# Patient Record
Sex: FEMALE | Race: WHITE | Employment: FULL TIME | URBAN - METROPOLITAN AREA
[De-identification: names, ages, dates, MRNs, and addresses within clinical notes are randomized per-mention and may not be internally consistent; named-entity substitution may affect disease eponyms.]

---

## 2017-01-13 ENCOUNTER — HOSPITAL ENCOUNTER (OUTPATIENT)
Dept: NON INVASIVE DIAGNOSTICS | Age: 62
Discharge: HOME | End: 2017-01-13
Attending: INTERNAL MEDICINE

## 2017-01-13 DIAGNOSIS — E11.9 CONTROLLED TYPE 2 DIABETES MELLITUS WITHOUT COMPLICATION, WITHOUT LONG-TERM CURRENT USE OF INSULIN (HCC): ICD-10-CM

## 2017-01-13 DIAGNOSIS — Z00.00 PHYSICAL EXAM: ICD-10-CM

## 2017-01-13 LAB
ATTENDING PHYSICIAN, CST07: NORMAL
DIAGNOSIS, 93000: NORMAL
DUKE TM SCORE RESULT, CST14: NORMAL
DUKE TREADMILL SCORE, CST13: 1
ECG INTERP BEFORE EX, CST11: NORMAL
ECG INTERP DURING EX, CST12: NORMAL
FUNCTIONAL CAPACITY, CST17: NORMAL
KNOWN CARDIAC CONDITION, CST08: NORMAL
MAX. DIASTOLIC BP, CST04: 86 MMHG
MAX. HEART RATE, CST05: 155 BPM
MAX. SYSTOLIC BP, CST03: 150 MMHG
OVERALL BP RESPONSE TO EXERCISE, CST16: NORMAL
OVERALL HR RESPONSE TO EXERCISE, CST15: NORMAL
PEAK EX METS, CST10: 7 METS
PROTOCOL NAME, CST01: NORMAL
TEST INDICATION, CST09: NORMAL

## 2017-01-13 NOTE — TELEPHONE ENCOUNTER
Patient would like to request a new [employer]. Patient mentioned that this was discussed at her last visit. Please advise!

## 2017-01-15 RX ORDER — INSULIN PUMP SYRINGE, 3 ML
EACH MISCELLANEOUS
Qty: 1 KIT | Refills: 0 | Status: SHIPPED | OUTPATIENT
Start: 2017-01-15 | End: 2021-08-03 | Stop reason: SDUPTHER

## 2017-05-10 RX ORDER — LOSARTAN POTASSIUM 25 MG/1
TABLET ORAL
Qty: 90 TAB | Refills: 0 | Status: SHIPPED | OUTPATIENT
Start: 2017-05-10 | End: 2017-07-20 | Stop reason: SDUPTHER

## 2017-05-10 RX ORDER — ATORVASTATIN CALCIUM 10 MG/1
TABLET, FILM COATED ORAL
Qty: 90 TAB | Refills: 0 | Status: SHIPPED | OUTPATIENT
Start: 2017-05-10 | End: 2017-07-20 | Stop reason: SDUPTHER

## 2017-05-10 RX ORDER — LEVOTHYROXINE SODIUM 25 UG/1
TABLET ORAL
Qty: 90 TAB | Refills: 0 | Status: SHIPPED | OUTPATIENT
Start: 2017-05-10 | End: 2017-07-20 | Stop reason: SDUPTHER

## 2017-05-10 RX ORDER — METFORMIN HYDROCHLORIDE 500 MG/1
TABLET ORAL
Qty: 180 TAB | Refills: 0 | Status: SHIPPED | OUTPATIENT
Start: 2017-05-10 | End: 2017-07-20 | Stop reason: SDUPTHER

## 2017-07-20 ENCOUNTER — OFFICE VISIT (OUTPATIENT)
Dept: INTERNAL MEDICINE CLINIC | Facility: CLINIC | Age: 62
End: 2017-07-20

## 2017-07-20 VITALS
HEART RATE: 75 BPM | WEIGHT: 215 LBS | DIASTOLIC BLOOD PRESSURE: 73 MMHG | BODY MASS INDEX: 38.09 KG/M2 | HEIGHT: 63 IN | RESPIRATION RATE: 18 BRPM | SYSTOLIC BLOOD PRESSURE: 137 MMHG | TEMPERATURE: 98.3 F

## 2017-07-20 DIAGNOSIS — E78.5 HYPERLIPIDEMIA, UNSPECIFIED HYPERLIPIDEMIA TYPE: ICD-10-CM

## 2017-07-20 DIAGNOSIS — E03.9 ACQUIRED HYPOTHYROIDISM: ICD-10-CM

## 2017-07-20 DIAGNOSIS — E66.09 OBESITY DUE TO EXCESS CALORIES, UNSPECIFIED OBESITY SEVERITY: ICD-10-CM

## 2017-07-20 DIAGNOSIS — I10 ESSENTIAL HYPERTENSION WITH GOAL BLOOD PRESSURE LESS THAN 140/90: ICD-10-CM

## 2017-07-20 DIAGNOSIS — E11.9 CONTROLLED TYPE 2 DIABETES MELLITUS WITHOUT COMPLICATION, WITHOUT LONG-TERM CURRENT USE OF INSULIN (HCC): Primary | ICD-10-CM

## 2017-07-20 LAB — HBA1C MFR BLD HPLC: 6.4 %

## 2017-07-20 RX ORDER — LOSARTAN POTASSIUM 25 MG/1
TABLET ORAL
Qty: 90 TAB | Refills: 1 | Status: SHIPPED | OUTPATIENT
Start: 2017-07-20 | End: 2017-11-10 | Stop reason: SDUPTHER

## 2017-07-20 RX ORDER — LEVOTHYROXINE SODIUM 25 UG/1
TABLET ORAL
Qty: 90 TAB | Refills: 1 | Status: SHIPPED | OUTPATIENT
Start: 2017-07-20 | End: 2017-11-10 | Stop reason: SDUPTHER

## 2017-07-20 RX ORDER — METFORMIN HYDROCHLORIDE 500 MG/1
TABLET ORAL
Qty: 180 TAB | Refills: 1 | Status: SHIPPED | OUTPATIENT
Start: 2017-07-20 | End: 2017-11-10 | Stop reason: SDUPTHER

## 2017-07-20 RX ORDER — ATORVASTATIN CALCIUM 10 MG/1
TABLET, FILM COATED ORAL
Qty: 90 TAB | Refills: 1 | Status: SHIPPED | OUTPATIENT
Start: 2017-07-20 | End: 2017-11-10 | Stop reason: SDUPTHER

## 2017-07-20 NOTE — MR AVS SNAPSHOT
Visit Information Date & Time Provider Department Dept. Phone Encounter #  
 7/20/2017  8:15 AM Kavya Tran  [facility] Internal Medicine [phone] [id number] Follow-up Instructions Return in about 3 months (around 10/20/2017) for follow up diabetes check, and weight check. Upcoming Health Maintenance Date Due Hepatitis C Screening 1955 FOOT EXAM Q1 5/6/1964 EYE EXAM RETINAL OR DILATED Q1 5/6/1964 FOBT Q 1 YEAR AGE 50-75 5/6/2004 PAP AKA CERVICAL CYTOLOGY 5/8/2009 ZOSTER VACCINE AGE 60> 5/6/2014 HEMOGLOBIN A1C Q6M 6/28/2017 INFLUENZA AGE 9 TO ADULT 8/1/2017 MICROALBUMIN Q1 12/28/2017 LIPID PANEL Q1 12/28/2017 BREAST CANCER SCRN MAMMOGRAM 3/21/2019 DTaP/Tdap/Td series (2 - Td) 9/9/2026 Allergies as of 7/20/2017  Review Complete On: 7/20/2017 By: Ciera Soriano LPN Severity Noted Reaction Type Reactions Penicillins  01/02/2014    Unknown (comments) Current Immunizations  Never Reviewed Name Date Influenza Vaccine (Quad) PF 9/9/2016, 11/25/2015 Pneumococcal Polysaccharide (PPSV-23) 7/15/2015 Tdap 9/9/2016 Not reviewed this visit You Were Diagnosed With   
  
 Codes Comments Controlled type 2 diabetes mellitus without complication, without long-term current use of insulin ([facility]    -  Primary ICD-10-CM: E11.9 ICD-9-CM: 250.00 Hyperlipidemia, unspecified hyperlipidemia type     ICD-10-CM: E78.5 ICD-9-CM: 272.4 Essential hypertension with goal blood pressure less than 140/90     ICD-10-CM: I10 
ICD-9-CM: 401.9 Obesity due to excess calories, unspecified obesity severity     ICD-10-CM: E66.09 
ICD-9-CM: 278.00 Vitals BP Pulse Temp Resp Height(growth percentile) Weight(growth percentile)  
 137/73 (BP 1 Location: Left arm, BP Patient Position: Sitting) 75 98.3 °F (36.8 °C) (Oral) 18 5' 3\" (1.6 m) 215 lb (97.5 kg) BMI OB Status Smoking Status 38.09 kg/m2 Hysterectomy Never Smoker Vitals History BMI and BSA Data Body Mass Index Body Surface Area 38.09 kg/m 2 2.08 m 2 Preferred Pharmacy Pharmacy Name Phone [facility], [address] [phone] Your Updated Medication List  
  
   
This list is accurate as of: 7/20/17  9:32 AM.  Always use your most recent med list.  
  
  
  
  
 atorvastatin 10 mg tablet Commonly known as:  LIPITOR  
TAKE 1 TABLET BY MOUTH DAILY Blood-Glucose Meter monitoring kit One touch ultra meter  
  
 calcium carbonate 600 mg calcium (1,500 mg) tablet Commonly known as:  Isauro Nestle Take 600 mg by mouth two (2) times a day. levothyroxine 25 mcg tablet Commonly known as:  SYNTHROID  
TAKE 1 TABLET BY MOUTH DAILY losartan 25 mg tablet Commonly known as:  COZAAR  
TAKE 1 TABLET BY MOUTH DAILY  
  
 metFORMIN 500 mg tablet Commonly known as:  GLUCOPHAGE  
TAKE 1 TABLET BY MOUTH TWICE DAILY  
  
 VITAMIN D3 2,000 unit Tab Generic drug:  cholecalciferol (vitamin D3) Take  by mouth. Prescriptions Sent to Pharmacy Refills  
 losartan (COZAAR) 25 mg tablet 1 Sig: TAKE 1 TABLET BY MOUTH DAILY Class: Normal  
 Pharmacy: [address] [place] [address] #: [phone]  
 atorvastatin (LIPITOR) 10 mg tablet 1 Sig: TAKE 1 TABLET BY MOUTH DAILY Class: Normal  
 Pharmacy: [address] [place] [address], [facility] Ph #: [phone]  
 metFORMIN (GLUCOPHAGE) 500 mg tablet 1 Sig: TAKE 1 TABLET BY MOUTH TWICE DAILY Class: Normal  
 Pharmacy: [address] [place] [address] Ph #: [phone]  
 levothyroxine (SYNTHROID) 25 mcg tablet 1 Sig: TAKE 1 TABLET BY MOUTH DAILY Class: Normal  
 Pharmacy: [address] [place] [address], [facility] Ph #: [phone] Follow-up Instructions Return in about 3 months (around 10/20/2017) for follow up diabetes check, and weight check. Introducing [facility]! Dear Parish Buckner: Thank you for requesting a [employer] account. Our records indicate that you already have an active [employer] account. You can access your account anytime at [email or web address] Did you know that you can access your hospital and ER discharge instructions at any time in [employer]? You can also review all of your test results from your hospital stay or ER visit. Additional Information If you have questions, please visit the Frequently Asked Questions section of the [employer] website at [email or web address] Remember, [employer] is NOT to be used for urgent needs. For medical emergencies, dial 911. Now available from your iPhone and Android! Please provide this summary of care documentation to your next provider. Your primary care clinician is listed as Ines Wheatley. If you have any questions after today's visit, please call [phone].

## 2017-07-20 NOTE — PROGRESS NOTES
Subjective:      Michael Henson is a 60 y.o. female who presents today for No chief complaint on file. Patient in today for follow up and for complete physical     1) patient is diabetic. She was diagnosed 3 years ago. She does have a family history. A1c was 6.5 at last visit. Today it is 6.4. She takes metformin. She does take losartan 25 mg po daily. She checks her blood sugars sporadically. Fasting  Has been around 120. Eura Jessica She does take flu and pneumovax and are up to date. She sees ophthalmologist yearly. She does not see a podiatrist.  2) hyperlipidemia: takes atorvastatin daily, no side effects  3) obesity: does not follow diet and does not exercise. BMI 37 at last visit and now is 45  Patient is a member of  weight watchers to try to help with weight loss. She has not been attending the classes  She walks for exercise  4) hypothyroid- will need to check labs today    Patient Active Problem List    Diagnosis Date Noted    Diabetes mellitus type 2, controlled ([facility] 09/09/2016    Encounter for immunization 09/09/2016    Hyperlipidemia 09/09/2016    Essential hypertension with goal blood pressure less than 140/90 09/09/2016    Gastroesophageal reflux disease without esophagitis 09/09/2016    Symptomatic menopausal or female climacteric states 04/05/2012    Acquired absence of both cervix and uterus 04/05/2012     Current Outpatient Prescriptions   Medication Sig Dispense Refill    losartan (COZAAR) 25 mg tablet TAKE 1 TABLET BY MOUTH DAILY 90 Tab 0    atorvastatin (LIPITOR) 10 mg tablet TAKE 1 TABLET BY MOUTH DAILY 90 Tab 0    levothyroxine (SYNTHROID) 25 mcg tablet TAKE 1 TABLET BY MOUTH DAILY 90 Tab 0    metFORMIN (GLUCOPHAGE) 500 mg tablet TAKE 1 TABLET BY MOUTH TWICE DAILY 180 Tab 0    Blood-Glucose Meter monitoring kit One touch ultra meter 1 Kit 0    calcium carbonate (CALTREX) 600 mg (1,500 mg) tablet Take 600 mg by mouth two (2) times a day.       cholecalciferol, vitamin D3, (VITAMIN D3) 2,000 unit tab Take  by mouth. Allergies   Allergen Reactions    Penicillins Unknown (comments)     Past Medical History:   Diagnosis Date    Acquired absence of both cervix and uterus 4/5/2012    Diabetes ([address]     Hx of bone density study     Osteopenia    Hx of mammogram 2/25/13    Negative    Hypercholesterolemia     Osteopenia 11/11    Mild    Pap smear for cervical cancer screening 5/8/06    Normal Pap    Symptomatic menopausal or female climacteric states 4/5/2012     Past Surgical History:   Procedure Laterality Date    HX BREAST BIOPSY      HX TOTAL ABDOMINAL HYSTERECTOMY  10/2003     Family History   Problem Relation Age of Onset    Diabetes Sister     Cancer Father      Lung/Prostate    MS Mother      Social History   Substance Use Topics    Smoking status: Never Smoker    Smokeless tobacco: Never Used    Alcohol use Not on file        Review of Systems    A comprehensive review of systems was negative except for that written in the HPI. Objective:     Visit Vitals    /73 (BP 1 Location: Left arm, BP Patient Position: Sitting)    Pulse 75    Temp 98.3 °F (36.8 °C) (Oral)    Resp 18    Ht 5' 3\" (1.6 m)    Wt 215 lb (97.5 kg)    BMI 38.09 kg/m2     General:  Alert, cooperative, no distress, appears stated age. Head:  Normocephalic, without obvious abnormality, atraumatic. Eyes:  Conjunctivae/corneas clear. PERRL, EOMs intact. Fundi benign. Ears:  Normal TMs and external ear canals both ears. Nose: Nares normal. Septum midline. Mucosa normal. No drainage or sinus tenderness. Throat: Lips, mucosa, and tongue normal. Teeth and gums normal.   Neck: Supple, symmetrical, trachea midline, no adenopathy, thyroid: no enlargement/tenderness/nodules, no carotid bruit and no JVD. Back:   Symmetric, no curvature. ROM normal. No CVA tenderness. Lungs:   Clear to auscultation bilaterally. Chest wall:  No tenderness or deformity.    Heart:  Regular rate and rhythm, S1, S2 normal, no murmur, click, rub or gallop. Abdomen:   Soft, non-tender. Bowel sounds normal. No masses,  No organomegaly. Extremities: Extremities normal, atraumatic, no cyanosis or edema. Pulses: 2+ and symmetric all extremities. Skin: Skin color, texture, turgor normal. No rashes or lesions. Lymph nodes: Cervical, supraclavicular, and axillary nodes normal.   Neurologic: CNII-XII intact. Normal strength, sensation and reflexes throughout. Assessment/Plan:       ICD-10-CM ICD-9-CM    1. Controlled type 2 diabetes mellitus without complication, without long-term current use of insulin ([facility]) J32.4 172.28 METABOLIC PANEL, COMPREHENSIVE      AMB POC HEMOGLOBIN A1C   2. Hyperlipidemia, unspecified hyperlipidemia type W29.4 799.0 METABOLIC PANEL, COMPREHENSIVE      LIPID PANEL   3. Essential hypertension with goal blood pressure less than 140/90 I10 401.9    4. Obesity due to excess calories, unspecified obesity severity E66.09 278.00 Discussed with patient and set goal of 15 lbe weight loss by her next visit in 3 months   5. Acquired hypothyroidism E03.9 244.9 T4, FREE      TSH 3RD GENERATION     15 lb weight loss by next visit  Of note she had zostavax at pharmacy        Follow-up Disposition: Not on File   Advised her to call back or return to office if symptoms worsen/change/persist.  Discussed expected course/resolution/complications of diagnosis in detail with patient. Medication risks/benefits/costs/interactions/alternatives discussed with patient. She was given an after visit summary which includes diagnoses, current medications, & vitals. She expressed understanding with the diagnosis and plan.

## 2017-07-20 NOTE — PROGRESS NOTES
No chief complaint on file. 1. Have you been to the ER, urgent care clinic since your last visit? Hospitalized since your last visit? No    2. Have you seen or consulted any other health care providers outside of the [address] [place] [address] since your last visit? Include any pap smears or colon screening.  No

## 2017-07-21 LAB
ALBUMIN SERPL-MCNC: 4.2 G/DL (ref 3.6–4.8)
ALBUMIN/GLOB SERPL: 1.3 {RATIO} (ref 1.2–2.2)
ALP SERPL-CCNC: 68 IU/L (ref 39–117)
ALT SERPL-CCNC: 59 IU/L (ref 0–32)
AST SERPL-CCNC: 45 IU/L (ref 0–40)
BILIRUB SERPL-MCNC: 0.2 MG/DL (ref 0–1.2)
BUN SERPL-MCNC: 9 MG/DL (ref 8–27)
BUN/CREAT SERPL: 17 (ref 12–28)
CALCIUM SERPL-MCNC: 9.3 MG/DL (ref 8.7–10.3)
CHLORIDE SERPL-SCNC: 98 MMOL/L (ref 96–106)
CHOLEST SERPL-MCNC: 117 MG/DL (ref 100–199)
CO2 SERPL-SCNC: 24 MMOL/L (ref 18–29)
CREAT SERPL-MCNC: 0.53 MG/DL (ref 0.57–1)
GLOBULIN SER CALC-MCNC: 3.3 G/DL (ref 1.5–4.5)
GLUCOSE SERPL-MCNC: 107 MG/DL (ref 65–99)
HDLC SERPL-MCNC: 32 MG/DL
LDLC SERPL CALC-MCNC: 63 MG/DL (ref 0–99)
POTASSIUM SERPL-SCNC: 4.3 MMOL/L (ref 3.5–5.2)
PROT SERPL-MCNC: 7.5 G/DL (ref 6–8.5)
SODIUM SERPL-SCNC: 140 MMOL/L (ref 134–144)
T4 FREE SERPL-MCNC: 1.29 NG/DL (ref 0.82–1.77)
TRIGL SERPL-MCNC: 110 MG/DL (ref 0–149)
TSH SERPL DL<=0.005 MIU/L-ACNC: 1.01 UIU/ML (ref 0.45–4.5)
VLDLC SERPL CALC-MCNC: 22 MG/DL (ref 5–40)

## 2017-08-01 ENCOUNTER — OFFICE VISIT (OUTPATIENT)
Dept: INTERNAL MEDICINE CLINIC | Facility: CLINIC | Age: 62
End: 2017-08-01

## 2017-08-01 VITALS
HEART RATE: 81 BPM | HEIGHT: 63 IN | SYSTOLIC BLOOD PRESSURE: 125 MMHG | WEIGHT: 216 LBS | OXYGEN SATURATION: 94 % | TEMPERATURE: 98.1 F | BODY MASS INDEX: 38.27 KG/M2 | RESPIRATION RATE: 16 BRPM | DIASTOLIC BLOOD PRESSURE: 62 MMHG

## 2017-08-01 DIAGNOSIS — B96.89 BACTERIAL SINUSITIS: Primary | ICD-10-CM

## 2017-08-01 DIAGNOSIS — J32.9 BACTERIAL SINUSITIS: Primary | ICD-10-CM

## 2017-08-01 RX ORDER — DOXYCYCLINE 100 MG/1
100 CAPSULE ORAL 2 TIMES DAILY
Qty: 14 CAP | Refills: 0 | Status: SHIPPED | OUTPATIENT
Start: 2017-08-01 | End: 2017-12-17

## 2017-08-01 NOTE — PROGRESS NOTES
Subjective:      Za Mayes is a 60 y.o. female who presents today for sinus concern. Sinus complaint: she states she has had 2 weeks ago she had a sore throat, sinus congestion, cough at night. She states this improved but then worsened this past Sunday. She states her nasal congestion is very thick, dark. She states her cough has improved. She is taking nasal strips to help her sleep, wilma seltzer plus, afrin yesterday. She does have sinus pressure under the eyes. She denies fevers, ear pain, nausea. Patient Active Problem List    Diagnosis Date Noted    Diabetes mellitus type 2, controlled ([facility] 09/09/2016    Encounter for immunization 09/09/2016    Hyperlipidemia 09/09/2016    Essential hypertension with goal blood pressure less than 140/90 09/09/2016    Gastroesophageal reflux disease without esophagitis 09/09/2016    Symptomatic menopausal or female climacteric states 04/05/2012    Acquired absence of both cervix and uterus 04/05/2012     Current Outpatient Prescriptions   Medication Sig Dispense Refill    doxycycline (MONODOX) 100 mg capsule Take 1 Cap by mouth two (2) times a day. 14 Cap 0    losartan (COZAAR) 25 mg tablet TAKE 1 TABLET BY MOUTH DAILY 90 Tab 1    atorvastatin (LIPITOR) 10 mg tablet TAKE 1 TABLET BY MOUTH DAILY 90 Tab 1    metFORMIN (GLUCOPHAGE) 500 mg tablet TAKE 1 TABLET BY MOUTH TWICE DAILY 180 Tab 1    levothyroxine (SYNTHROID) 25 mcg tablet TAKE 1 TABLET BY MOUTH DAILY 90 Tab 1    Blood-Glucose Meter monitoring kit One touch ultra meter 1 Kit 0    calcium carbonate (CALTREX) 600 mg (1,500 mg) tablet Take 600 mg by mouth two (2) times a day.  cholecalciferol, vitamin D3, (VITAMIN D3) 2,000 unit tab Take  by mouth.        Allergies   Allergen Reactions    Penicillins Unknown (comments)     Past Medical History:   Diagnosis Date    Acquired absence of both cervix and uterus 4/5/2012    Diabetes [facility]     Hx of bone density study     Osteopenia    Hx of mammogram 2/25/13    Negative    Hypercholesterolemia     Osteopenia 11/11    Mild    Pap smear for cervical cancer screening 5/8/06    Normal Pap    Symptomatic menopausal or female climacteric states 4/5/2012     Family History   Problem Relation Age of Onset    Diabetes Sister     Cancer Father      Lung/Prostate    MS Mother      Social History   Substance Use Topics    Smoking status: Never Smoker    Smokeless tobacco: Never Used    Alcohol use Not on file        Review of Systems    A comprehensive review of systems was negative except for that written in the HPI. Objective:     Visit Vitals    /62    Pulse 81    Temp 98.1 °F (36.7 °C) (Oral)    Resp 16    Ht 5' 3\" (1.6 m)    Wt 216 lb (98 kg)    SpO2 94%    BMI 38.26 kg/m2     General appearance: alert, cooperative, no distress, appears stated age  Head: Normocephalic, without obvious abnormality, atraumatic  Eyes: negative  Ears: abnormal TM AD - serous middle ear fluid, abnormal TM AS - serous middle ear fluid  Nose: turbinates edematous, inflamed, no sinus tenderness  Throat: Lips, mucosa, and tongue normal. Teeth and gums normal  Neck: supple, symmetrical, trachea midline, mild anterior cervical adenopathy, thyroid: not enlarged, symmetric, no tenderness/mass/nodules, no carotid bruit and no JVD  Lungs: clear to auscultation bilaterally  Heart: regular rate and rhythm, S1, S2 normal, no murmur, click, rub or gallop  Extremities: extremities normal, atraumatic, no cyanosis or edema  Neurologic: Grossly normal  Nursing note and vitals reviewed  Assessment/Plan:       ICD-10-CM ICD-9-CM    1. Bacterial sinusitis J32.9 473.9 doxycycline (MONODOX) 100 mg capsule    B96.89 041. 9    She stated understanding that she will not take her calcium with the doxycycline. Follow-up Disposition:  Return if symptoms worsen or fail to improve.      Advised her to call back or return to office if symptoms worsen/change/persist.  Discussed expected course/resolution/complications of diagnosis in detail with patient. Medication risks/benefits/costs/interactions/alternatives discussed with patient. She was given an after visit summary which includes diagnoses, current medications, & vitals. She expressed understanding with the diagnosis and plan.

## 2017-08-01 NOTE — MR AVS SNAPSHOT
Visit Information Date & Time Provider Department Dept. Phone Encounter #  
 8/1/2017 10:30 AM Mana Morales NP [facility] Internal Medicine [phone] [id number] Follow-up Instructions Return if symptoms worsen or fail to improve. Upcoming Health Maintenance Date Due Hepatitis C Screening 1955 FOOT EXAM Q1 5/6/1965 EYE EXAM RETINAL OR DILATED Q1 5/6/1964 FOBT Q 1 YEAR AGE 50-75 5/6/2004 PAP AKA CERVICAL CYTOLOGY 5/8/2009 ZOSTER VACCINE AGE 60> 3/6/2014 INFLUENZA AGE 9 TO ADULT 8/1/2017 MICROALBUMIN Q1 12/28/2017 HEMOGLOBIN A1C Q6M 1/20/2018 LIPID PANEL Q1 7/20/2018 BREAST CANCER SCRN MAMMOGRAM 3/21/2019 DTaP/Tdap/Td series (2 - Td) 9/9/2026 Allergies as of 8/1/2017  Review Complete On: 8/1/2017 By: Mana Morales NP Severity Noted Reaction Type Reactions Penicillins  01/02/2014    Unknown (comments) Current Immunizations  Never Reviewed Name Date Influenza Vaccine (Quad) PF 9/9/2016, 11/25/2015 Pneumococcal Polysaccharide (PPSV-23) 7/15/2015 Tdap 9/9/2016 Not reviewed this visit You Were Diagnosed With   
  
 Codes Comments Bacterial sinusitis    -  Primary ICD-10-CM: J32.9, B96.89 
ICD-9-CM: 473.9, 041.9 Vitals BP Pulse Temp Resp Height(growth percentile) Weight(growth percentile) 125/62 81 98.1 °F (36.7 °C) (Oral) 16 5' 3\" (1.6 m) 216 lb (98 kg) SpO2 BMI OB Status Smoking Status 94% 38.26 kg/m2 Hysterectomy Never Smoker Vitals History BMI and BSA Data Body Mass Index Body Surface Area  
 38.26 kg/m 2 2.09 m 2 Preferred Pharmacy Pharmacy Name Phone [facility], [address] [place] [address] [phone] Your Updated Medication List  
  
   
This list is accurate as of: 8/1/17 10:58 AM.  Always use your most recent med list.  
  
  
  
  
 atorvastatin 10 mg tablet Commonly known as:  LIPITOR  
TAKE 1 TABLET BY MOUTH DAILY Blood-Glucose Meter monitoring kit One touch ultra meter  
  
 calcium carbonate 600 mg calcium (1,500 mg) tablet Commonly known as:  Fraser Bishop Take 600 mg by mouth two (2) times a day. doxycycline 100 mg capsule Commonly known as:  Lena Shiva Take 1 Cap by mouth two (2) times a day. levothyroxine 25 mcg tablet Commonly known as:  SYNTHROID  
TAKE 1 TABLET BY MOUTH DAILY losartan 25 mg tablet Commonly known as:  COZAAR  
TAKE 1 TABLET BY MOUTH DAILY  
  
 metFORMIN 500 mg tablet Commonly known as:  GLUCOPHAGE  
TAKE 1 TABLET BY MOUTH TWICE DAILY  
  
 VITAMIN D3 2,000 unit Tab Generic drug:  cholecalciferol (vitamin D3) Take  by mouth. Prescriptions Sent to Pharmacy Refills  
 doxycycline (MONODOX) 100 mg capsule 0 Sig: Take 1 Cap by mouth two (2) times a day. Class: Normal  
 Pharmacy: [address] [place] [address] #: [phone] Route: Oral  
  
Follow-up Instructions Return if symptoms worsen or fail to improve. Patient Instructions Sinusitis: Care Instructions Your Care Instructions Sinusitis is an infection of the lining of the sinus cavities in your head. Sinusitis often follows a cold. It causes pain and pressure in your head and face. In most cases, sinusitis gets better on its own in 1 to 2 weeks. But some mild symptoms may last for several weeks. Sometimes antibiotics are needed. Follow-up care is a key part of your treatment and safety. Be sure to make and go to all appointments, and call your doctor if you are having problems. It's also a good idea to know your test results and keep a list of the medicines you take. How can you care for yourself at home?  
· Take an over-the-counter pain medicine, such as acetaminophen (Tylenol), ibuprofen (Advil, Motrin), or naproxen (Aleve). Read and follow all instructions on the label. · If the doctor prescribed antibiotics, take them as directed. Do not stop taking them just because you feel better. You need to take the full course of antibiotics. · Be careful when taking over-the-counter cold or flu medicines and Tylenol at the same time. Many of these medicines have acetaminophen, which is Tylenol. Read the labels to make sure that you are not taking more than the recommended dose. Too much acetaminophen (Tylenol) can be harmful. · Breathe warm, moist air from a steamy shower, a hot bath, or a sink filled with hot water. Avoid cold, dry air. Using a humidifier in your home may help. Follow the directions for cleaning the machine. · Use saline (saltwater) nasal washes to help keep your nasal passages open and wash out mucus and bacteria. You can buy saline nose drops at a grocery store or drugstore. Or you can make your own at home by adding 1 teaspoon of salt and 1 teaspoon of baking soda to 2 cups of distilled water. If you make your own, fill a bulb syringe with the solution, insert the tip into your nostril, and squeeze gently. Jayashree Cramp your nose. · Put a hot, wet towel or a warm gel pack on your face 3 or 4 times a day for 5 to 10 minutes each time. · Try a decongestant nasal spray like oxymetazoline (Afrin). Do not use it for more than 3 days in a row. Using it for more than 3 days can make your congestion worse. When should you call for help? Call your doctor now or seek immediate medical care if: 
· You have new or worse swelling or redness in your face or around your eyes. · You have a new or higher fever. Watch closely for changes in your health, and be sure to contact your doctor if: 
· You have new or worse facial pain. · The mucus from your nose becomes thicker (like pus) or has new blood in it. · You are not getting better as expected. Where can you learn more? Go to http://anuj-nelida.info/. Enter Z133 in the search box to learn more about \"Sinusitis: Care Instructions. \" Current as of: July 29, 2016 Content Version: 11.3 © 7666-9276 "[employer]. Care instructions adapted under license by [employer] (which disclaims liability or warranty for this information). If you have questions about a medical condition or this instruction, always ask your healthcare professional. [place] [address] any warranty or liability for your use of this information. Introducing [address]! Dear Tone Alvarez: Thank you for requesting a [employer] account. Our records indicate that you already have an active [employer] account. You can access your account anytime at [email or web address] Did you know that you can access your hospital and ER discharge instructions at any time in UT[employer]? You can also review all of your test results from your hospital stay or ER visit. Additional Information If you have questions, please visit the Frequently Asked Questions section of the [employer] website at [email or web address] Remember, [employer] is NOT to be used for urgent needs. For medical emergencies, dial 911. Now available from your iPhone and Android! Please provide this summary of care documentation to your next provider. Your primary care clinician is listed as Natalia Adair. If you have any questions after today's visit, please call [phone].

## 2017-08-01 NOTE — PATIENT INSTRUCTIONS
Sinusitis: Care Instructions  Your Care Instructions    Sinusitis is an infection of the lining of the sinus cavities in your head. Sinusitis often follows a cold. It causes pain and pressure in your head and face. In most cases, sinusitis gets better on its own in 1 to 2 weeks. But some mild symptoms may last for several weeks. Sometimes antibiotics are needed. Follow-up care is a key part of your treatment and safety. Be sure to make and go to all appointments, and call your doctor if you are having problems. It's also a good idea to know your test results and keep a list of the medicines you take. How can you care for yourself at home? · Take an over-the-counter pain medicine, such as acetaminophen (Tylenol), ibuprofen (Advil, Motrin), or naproxen (Aleve). Read and follow all instructions on the label. · If the doctor prescribed antibiotics, take them as directed. Do not stop taking them just because you feel better. You need to take the full course of antibiotics. · Be careful when taking over-the-counter cold or flu medicines and Tylenol at the same time. Many of these medicines have acetaminophen, which is Tylenol. Read the labels to make sure that you are not taking more than the recommended dose. Too much acetaminophen (Tylenol) can be harmful. · Breathe warm, moist air from a steamy shower, a hot bath, or a sink filled with hot water. Avoid cold, dry air. Using a humidifier in your home may help. Follow the directions for cleaning the machine. · Use saline (saltwater) nasal washes to help keep your nasal passages open and wash out mucus and bacteria. You can buy saline nose drops at a grocery store or drugstore. Or you can make your own at home by adding 1 teaspoon of salt and 1 teaspoon of baking soda to 2 cups of distilled water. If you make your own, fill a bulb syringe with the solution, insert the tip into your nostril, and squeeze gently. Isaiah Grammes your nose.   · Put a hot, wet towel or a warm gel pack on your face 3 or 4 times a day for 5 to 10 minutes each time. · Try a decongestant nasal spray like oxymetazoline (Afrin). Do not use it for more than 3 days in a row. Using it for more than 3 days can make your congestion worse. When should you call for help? Call your doctor now or seek immediate medical care if:  · You have new or worse swelling or redness in your face or around your eyes. · You have a new or higher fever. Watch closely for changes in your health, and be sure to contact your doctor if:  · You have new or worse facial pain. · The mucus from your nose becomes thicker (like pus) or has new blood in it. · You are not getting better as expected. Where can you learn more? Go to http://anuj-nelida.info/. Enter U332 in the search box to learn more about \"Sinusitis: Care Instructions. \"  Current as of: July 29, 2016  Content Version: 11.3  © 2823-2971 [employer]. Care instructions adapted under license by [employer] (which disclaims liability or warranty for this information). If you have questions about a medical condition or this instruction, always ask your healthcare professional. [place] [address] any warranty or liability for your use of this information.

## 2017-08-01 NOTE — PROGRESS NOTES
Chief Complaint   Patient presents with    Cough     Pt stated congestion, dry cough, and losing her voice for 2 weeks       1. Have you been to the ER, urgent care clinic since your last visit? Hospitalized since your last visit? No       2. Have you seen or consulted any other health care providers outside of the [facility] since your last visit? Include any pap smears or colon screening.  No

## 2017-11-12 RX ORDER — LOSARTAN POTASSIUM 25 MG/1
TABLET ORAL
Qty: 90 TAB | Refills: 1 | Status: SHIPPED | OUTPATIENT
Start: 2017-11-12 | End: 2018-04-17 | Stop reason: SDUPTHER

## 2017-11-12 RX ORDER — LEVOTHYROXINE SODIUM 25 UG/1
TABLET ORAL
Qty: 90 TAB | Refills: 1 | Status: SHIPPED | OUTPATIENT
Start: 2017-11-12 | End: 2018-04-17 | Stop reason: SDUPTHER

## 2017-11-12 RX ORDER — METFORMIN HYDROCHLORIDE 500 MG/1
TABLET ORAL
Qty: 180 TAB | Refills: 1 | Status: SHIPPED | OUTPATIENT
Start: 2017-11-12 | End: 2018-04-17 | Stop reason: SDUPTHER

## 2017-11-12 RX ORDER — ATORVASTATIN CALCIUM 10 MG/1
TABLET, FILM COATED ORAL
Qty: 90 TAB | Refills: 1 | Status: SHIPPED | OUTPATIENT
Start: 2017-11-12 | End: 2018-04-17 | Stop reason: SDUPTHER

## 2017-12-04 ENCOUNTER — OFFICE VISIT (OUTPATIENT)
Dept: INTERNAL MEDICINE CLINIC | Facility: CLINIC | Age: 62
End: 2017-12-04

## 2017-12-04 VITALS
RESPIRATION RATE: 18 BRPM | TEMPERATURE: 98.4 F | DIASTOLIC BLOOD PRESSURE: 62 MMHG | HEIGHT: 63 IN | BODY MASS INDEX: 37.56 KG/M2 | HEART RATE: 76 BPM | WEIGHT: 212 LBS | SYSTOLIC BLOOD PRESSURE: 122 MMHG

## 2017-12-04 DIAGNOSIS — E11.9 CONTROLLED TYPE 2 DIABETES MELLITUS WITHOUT COMPLICATION, WITHOUT LONG-TERM CURRENT USE OF INSULIN (HCC): ICD-10-CM

## 2017-12-04 DIAGNOSIS — E78.5 HYPERLIPIDEMIA, UNSPECIFIED HYPERLIPIDEMIA TYPE: ICD-10-CM

## 2017-12-04 DIAGNOSIS — Z23 ENCOUNTER FOR IMMUNIZATION: Primary | ICD-10-CM

## 2017-12-04 DIAGNOSIS — R74.01 TRANSAMINITIS: ICD-10-CM

## 2017-12-04 DIAGNOSIS — K21.9 GASTROESOPHAGEAL REFLUX DISEASE WITHOUT ESOPHAGITIS: ICD-10-CM

## 2017-12-04 LAB — HBA1C MFR BLD HPLC: 6.5 %

## 2017-12-04 NOTE — PROGRESS NOTES
Chief Complaint   Patient presents with    Diabetes     1. Have you been to the ER, urgent care clinic since your last visit? Hospitalized since your last visit? Yes When: july/August Where: [facility] Reason for visit: Broken foot    2. Have you seen or consulted any other health care providers outside of the [address] [place] [address] since your last visit? Include any pap smears or colon screening. Maylin Garcia  is a 60 y.o.  female  who present for influenza immunizations/injections. He/she denies any symptoms , reactions or allergies that would exclude them from being immunized today. Risks and adverse reactions were discussed and the VIS was given if applicable to them. All questions were addressed. He/She was observed for 5 min post injection. There were no reactions observed.     Azar Verdin LPN

## 2017-12-04 NOTE — PROGRESS NOTES
Subjective:      Sandy Schrader is a 60 y.o. female who presents today for   Chief Complaint   Patient presents with    Diabetes    Cholesterol Problem    Immunization/Injection     Flu vaccine today  Pneumovax is uptodate    Type 2 diabetes- a1c today is 6.5  Ophthalmologist due in feb  Does not see podaitrist    Hyperlipidemia- continues statin, compliant and denies side effects        Patient Active Problem List    Diagnosis Date Noted    Diabetes mellitus type 2, controlled ([facility] 09/09/2016    Encounter for immunization 09/09/2016    Hyperlipidemia 09/09/2016    Essential hypertension with goal blood pressure less than 140/90 09/09/2016    Gastroesophageal reflux disease without esophagitis 09/09/2016    Symptomatic menopausal or female climacteric states 04/05/2012    Acquired absence of both cervix and uterus 04/05/2012     Current Outpatient Prescriptions   Medication Sig Dispense Refill    atorvastatin (LIPITOR) 10 mg tablet TAKE 1 TABLET BY MOUTH DAILY 90 Tab 1    metFORMIN (GLUCOPHAGE) 500 mg tablet TAKE 1 TABLET BY MOUTH TWICE DAILY 180 Tab 1    losartan (COZAAR) 25 mg tablet TAKE 1 TABLET BY MOUTH DAILY 90 Tab 1    levothyroxine (SYNTHROID) 25 mcg tablet TAKE 1 TABLET BY MOUTH DAILY 90 Tab 1    Blood-Glucose Meter monitoring kit One touch ultra meter 1 Kit 0    calcium carbonate (CALTREX) 600 mg (1,500 mg) tablet Take 600 mg by mouth two (2) times a day.  cholecalciferol, vitamin D3, (VITAMIN D3) 2,000 unit tab Take  by mouth.  doxycycline (MONODOX) 100 mg capsule Take 1 Cap by mouth two (2) times a day.  14 Cap 0     Allergies   Allergen Reactions    Penicillins Unknown (comments)     Past Medical History:   Diagnosis Date    Acquired absence of both cervix and uterus 4/5/2012    Diabetes ([facility]     Hx of bone density study     Osteopenia    Hx of mammogram 2/25/13    Negative    Hypercholesterolemia     Osteopenia 11/11    Mild    Pap smear for cervical cancer screening 5/8/06    Normal Pap    Symptomatic menopausal or female climacteric states 4/5/2012     Past Surgical History:   Procedure Laterality Date    HX BREAST BIOPSY      HX TOTAL ABDOMINAL HYSTERECTOMY  10/2003     Family History   Problem Relation Age of Onset    Diabetes Sister     Cancer Father      Lung/Prostate    MS Mother      Social History   Substance Use Topics    Smoking status: Never Smoker    Smokeless tobacco: Never Used    Alcohol use Not on file        Review of Systems    A comprehensive review of systems was negative except for that written in the HPI. Objective:     Visit Vitals    /62    Pulse 76    Temp 98.4 °F (36.9 °C) (Oral)    Resp 18    Ht 5' 3\" (1.6 m)    Wt 212 lb (96.2 kg)    BMI 37.55 kg/m2     General:  Alert, cooperative, no distress, appears stated age. Head:  Normocephalic, without obvious abnormality, atraumatic. Eyes:  Conjunctivae/corneas clear. PERRL, EOMs intact. Fundi benign. Ears:  Normal TMs and external ear canals both ears. Nose: Nares normal. Septum midline. Mucosa normal. No drainage or sinus tenderness. Throat: Lips, mucosa, and tongue normal. Teeth and gums normal.   Neck: Supple, symmetrical, trachea midline, no adenopathy, thyroid: no enlargement/tenderness/nodules, no carotid bruit and no JVD. Back:   Symmetric, no curvature. ROM normal. No CVA tenderness. Lungs:   Clear to auscultation bilaterally. Chest wall:  No tenderness or deformity. Heart:  Regular rate and rhythm, S1, S2 normal, no murmur, click, rub or gallop. Abdomen:   Soft, non-tender. Bowel sounds normal. No masses,  No organomegaly. Extremities: Extremities normal, atraumatic, no cyanosis or edema. Pulses: 2+ and symmetric all extremities. Skin: Skin color, texture, turgor normal. No rashes or lesions. Lymph nodes: Cervical, supraclavicular, and axillary nodes normal.   Neurologic: CNII-XII intact.  Normal strength, sensation and reflexes throughout. Assessment/Plan:       ICD-10-CM ICD-9-CM    1. Encounter for immunization Z23 V03.89 INFLUENZA VIRUS VAC QUAD,SPLIT,PRESV FREE SYRINGE IM   2. Controlled type 2 diabetes mellitus without complication, without long-term current use of insulin (HCC) E11.9 250.00 AMB POC HEMOGLOBIN K9E      METABOLIC PANEL, COMPREHENSIVE   3. Hyperlipidemia, unspecified hyperlipidemia type E78.5 272.4 LIPID PANEL   4. Gastroesophageal reflux disease without esophagitis K21.9 530.81    5. BMI 37.0-37.9, adult Z68.37 V85.37    6. Transaminitis U74.7 568.7 METABOLIC PANEL, COMPREHENSIVE      HEPATITIS PANEL, ACUTE       Follow-up Disposition: Not on File   Advised her to call back or return to office if symptoms worsen/change/persist.  Discussed expected course/resolution/complications of diagnosis in detail with patient. Medication risks/benefits/costs/interactions/alternatives discussed with patient. She was given an after visit summary which includes diagnoses, current medications, & vitals. She expressed understanding with the diagnosis and plan.

## 2017-12-04 NOTE — MR AVS SNAPSHOT
Visit Information Date & Time Provider Department Dept. Phone Encounter #  
 12/4/2017  8:30 AM Meghan Leong  [facility] Internal Medicine [phone] [id number] Follow-up Instructions Return in about 3 months (around 3/4/2018) for follow up, bp check, diabetes check. Upcoming Health Maintenance Date Due Hepatitis C Screening 1955 FOOT EXAM Q1 5/6/1964 EYE EXAM RETINAL OR DILATED Q1 5/6/1964 FOBT Q 1 YEAR AGE 50-75 5/6/2004 PAP AKA CERVICAL CYTOLOGY 5/8/2009 ZOSTER VACCINE AGE 60> 3/6/2014 Influenza Age 5 to Adult 8/1/2017 MICROALBUMIN Q1 12/28/2017 HEMOGLOBIN A1C Q6M 1/20/2018 LIPID PANEL Q1 7/20/2018 BREAST CANCER SCRN MAMMOGRAM 3/21/2019 DTaP/Tdap/Td series (2 - Td) 9/9/2026 Allergies as of 12/4/2017  Review Complete On: 8/1/2017 By: Beck Neff NP Severity Noted Reaction Type Reactions Penicillins  01/02/2014    Unknown (comments) Current Immunizations  Never Reviewed Name Date Influenza Vaccine (Quad) PF  Incomplete, 9/9/2016, 11/25/2015 Pneumococcal Polysaccharide (PPSV-23) 7/15/2015 Tdap 9/9/2016 Not reviewed this visit You Were Diagnosed With   
  
 Codes Comments Encounter for immunization    -  Primary ICD-10-CM: Q90 ICD-9-CM: V03.89 Controlled type 2 diabetes mellitus without complication, without long-term current use of insulin [facility]     ICD-10-CM: E11.9 ICD-9-CM: 250.00 Hyperlipidemia, unspecified hyperlipidemia type     ICD-10-CM: E78.5 ICD-9-CM: 272.4 Gastroesophageal reflux disease without esophagitis     ICD-10-CM: K21.9 ICD-9-CM: 530.81 BMI 37.0-37.9, adult     ICD-10-CM: Z68.37 ICD-9-CM: V85.37 Transaminitis     ICD-10-CM: R74.0 ICD-9-CM: 790.4 Vitals BP Pulse Temp Resp Height(growth percentile) Weight(growth percentile) 122/62 76 98.4 °F (36.9 °C) (Oral) 18 5' 3\" (1.6 m) 212 lb (96.2 kg) BMI OB Status Smoking Status 37.55 kg/m2 Hysterectomy Never Smoker Vitals History BMI and BSA Data Body Mass Index Body Surface Area  
 37.55 kg/m 2 2.07 m 2 Preferred Pharmacy Pharmacy Name Phone [facility] [phone] Your Updated Medication List  
  
   
This list is accurate as of: 12/4/17  9:17 AM.  Always use your most recent med list.  
  
  
  
  
 atorvastatin 10 mg tablet Commonly known as:  LIPITOR  
TAKE 1 TABLET BY MOUTH DAILY Blood-Glucose Meter monitoring kit One touch ultra meter  
  
 calcium carbonate 600 mg calcium (1,500 mg) tablet Commonly known as:  Hillery Wade Take 600 mg by mouth two (2) times a day. doxycycline 100 mg capsule Commonly known as:  Winston Sabot Take 1 Cap by mouth two (2) times a day. levothyroxine 25 mcg tablet Commonly known as:  SYNTHROID  
TAKE 1 TABLET BY MOUTH DAILY losartan 25 mg tablet Commonly known as:  COZAAR  
TAKE 1 TABLET BY MOUTH DAILY  
  
 metFORMIN 500 mg tablet Commonly known as:  GLUCOPHAGE  
TAKE 1 TABLET BY MOUTH TWICE DAILY  
  
 VITAMIN D3 2,000 unit Tab Generic drug:  cholecalciferol (vitamin D3) Take  by mouth. We Performed the Following AMB POC HEMOGLOBIN A1C [74948 CPT(R)] HEPATITIS PANEL, ACUTE [68208 CPT(R)] INFLUENZA VIRUS VAC QUAD,SPLIT,PRESV FREE SYRINGE IM [id number] CPT(R)] LIPID PANEL [86296 CPT(R)] METABOLIC PANEL, COMPREHENSIVE [10160 CPT(R)] Follow-up Instructions Return in about 3 months (around 3/4/2018) for follow up, bp check, diabetes check. Introducing hospitals & HEALTH SERVICES! Dear Mainor Sanabria: Thank you for requesting a [employer] account. Our records indicate that you already have an active [employer] account. You can access your account anytime at [email or web address] Did you know that you can access your hospital and ER discharge instructions at any time in [employer] Online? You can also review all of your test results from your hospital stay or ER visit. Additional Information If you have questions, please visit the Frequently Asked Questions section of the [employer] website at [email or web address] Remember, [employer] Online is NOT to be used for urgent needs. For medical emergencies, dial 911. Now available from your iPhone and Android! Please provide this summary of care documentation to your next provider. Your primary care clinician is listed as Corona Peng. If you have any questions after today's visit, please call [phone].

## 2017-12-05 LAB
ALBUMIN SERPL-MCNC: 4.4 G/DL (ref 3.6–4.8)
ALBUMIN/GLOB SERPL: 1.5 {RATIO} (ref 1.2–2.2)
ALP SERPL-CCNC: 73 IU/L (ref 39–117)
ALT SERPL-CCNC: 29 IU/L (ref 0–32)
AST SERPL-CCNC: 21 IU/L (ref 0–40)
BILIRUB SERPL-MCNC: 0.3 MG/DL (ref 0–1.2)
BUN SERPL-MCNC: 11 MG/DL (ref 8–27)
BUN/CREAT SERPL: 19 (ref 12–28)
CALCIUM SERPL-MCNC: 9.3 MG/DL (ref 8.7–10.3)
CHLORIDE SERPL-SCNC: 98 MMOL/L (ref 96–106)
CHOLEST SERPL-MCNC: 135 MG/DL (ref 100–199)
CO2 SERPL-SCNC: 26 MMOL/L (ref 18–29)
CREAT SERPL-MCNC: 0.57 MG/DL (ref 0.57–1)
GFR SERPLBLD CREATININE-BSD FMLA CKD-EPI: 114 ML/MIN/1.73
GFR SERPLBLD CREATININE-BSD FMLA CKD-EPI: 99 ML/MIN/1.73
GLOBULIN SER CALC-MCNC: 2.9 G/DL (ref 1.5–4.5)
GLUCOSE SERPL-MCNC: 122 MG/DL (ref 65–99)
HAV IGM SERPL QL IA: NEGATIVE
HBV CORE IGM SERPL QL IA: NEGATIVE
HBV SURFACE AG SERPL QL IA: NEGATIVE
HCV AB S/CO SERPL IA: <0.1 S/CO RATIO (ref 0–0.9)
HDLC SERPL-MCNC: 35 MG/DL
LDLC SERPL CALC-MCNC: 76 MG/DL (ref 0–99)
POTASSIUM SERPL-SCNC: 4.3 MMOL/L (ref 3.5–5.2)
PROT SERPL-MCNC: 7.3 G/DL (ref 6–8.5)
SODIUM SERPL-SCNC: 141 MMOL/L (ref 134–144)
TRIGL SERPL-MCNC: 121 MG/DL (ref 0–149)
VLDLC SERPL CALC-MCNC: 24 MG/DL (ref 5–40)

## 2017-12-07 NOTE — PROGRESS NOTES
Called and spoke with pt per Dr. Ian Qureshi and explained her lab results Pt voiced and understanding and will follow up as needed.  Skyler Mendoza LPN

## 2017-12-07 NOTE — PROGRESS NOTES
HDL good cholesterol has come up!   Normal kidney function  Liver function tests have normalized  Hepatitis test is negative

## 2018-04-17 ENCOUNTER — OFFICE VISIT (OUTPATIENT)
Dept: INTERNAL MEDICINE CLINIC | Facility: CLINIC | Age: 63
End: 2018-04-17

## 2018-04-17 VITALS
HEIGHT: 63 IN | DIASTOLIC BLOOD PRESSURE: 78 MMHG | HEART RATE: 72 BPM | WEIGHT: 209 LBS | BODY MASS INDEX: 37.03 KG/M2 | TEMPERATURE: 98.3 F | SYSTOLIC BLOOD PRESSURE: 137 MMHG | RESPIRATION RATE: 18 BRPM

## 2018-04-17 DIAGNOSIS — E78.5 HYPERLIPIDEMIA, UNSPECIFIED HYPERLIPIDEMIA TYPE: ICD-10-CM

## 2018-04-17 DIAGNOSIS — E11.9 CONTROLLED TYPE 2 DIABETES MELLITUS WITHOUT COMPLICATION, WITHOUT LONG-TERM CURRENT USE OF INSULIN (HCC): Primary | ICD-10-CM

## 2018-04-17 DIAGNOSIS — E03.9 ACQUIRED HYPOTHYROIDISM: ICD-10-CM

## 2018-04-17 DIAGNOSIS — I10 ESSENTIAL HYPERTENSION WITH GOAL BLOOD PRESSURE LESS THAN 140/90: ICD-10-CM

## 2018-04-17 DIAGNOSIS — J30.9 ALLERGIC RHINITIS, UNSPECIFIED SEASONALITY, UNSPECIFIED TRIGGER: ICD-10-CM

## 2018-04-17 DIAGNOSIS — M85.80 OSTEOPENIA, UNSPECIFIED LOCATION: ICD-10-CM

## 2018-04-17 DIAGNOSIS — M79.645 PAIN OF LEFT THUMB: ICD-10-CM

## 2018-04-17 LAB — HBA1C MFR BLD HPLC: 6.4 %

## 2018-04-17 RX ORDER — FLUTICASONE PROPIONATE 50 MCG
2 SPRAY, SUSPENSION (ML) NASAL DAILY
COMMUNITY

## 2018-04-17 NOTE — PROGRESS NOTES
Subjective:      Yolanda Montoya is a 60 y.o. female who presents today for   Chief Complaint   Patient presents with    Diabetes     1) patient is diabetic. She takes metformin. A1c 6.5 at last visit to 6.4 today. She does take losartan 25 mg po daily. She checks her blood sugars sporadically. Fasting  Has been around  102-140. Hilario Oxford She does take flu and pneumovax and are up to date. She sees ophthalmologist yearly. She does not see a podiatrist.  2) hyperlipidemia: takes atorvastatin daily, no side effects  3)patient is attending [employer], attends weekly  She walks for exercise. Weight has decreased from 215 to 209  4) hypothyroid- will need to check labs today  5) allergic rhinitis- flonase  6) osteopenia- takes calcium and vit D   Chronic Left thumb pain- takes tylenol        Patient Active Problem List    Diagnosis Date Noted    [facility] mellitus type 2, [facility] 09/09/2016    Encounter for immunization 09/09/2016    Hyperlipidemia 09/09/2016    Essential hypertension with goal blood pressure less than 140/90 09/09/2016    Gastroesophageal reflux disease without esophagitis 09/09/2016    Symptomatic menopausal or female climacteric states 04/05/2012    Acquired absence of both cervix and uterus 04/05/2012     Current Outpatient Prescriptions   Medication Sig Dispense Refill    fluticasone (FLONASE ALLERGY RELIEF) 50 mcg/actuation nasal spray 2 Sprays by Both Nostrils route daily.  atorvastatin (LIPITOR) 10 mg tablet TAKE 1 TABLET BY MOUTH DAILY 90 Tab 1    metFORMIN (GLUCOPHAGE) 500 mg tablet TAKE 1 TABLET BY MOUTH TWICE DAILY 180 Tab 1    losartan (COZAAR) 25 mg tablet TAKE 1 TABLET BY MOUTH DAILY 90 Tab 1    levothyroxine (SYNTHROID) 25 mcg tablet TAKE 1 TABLET BY MOUTH DAILY 90 Tab 1    Blood-Glucose Meter monitoring kit One touch ultra meter 1 Kit 0    calcium carbonate (CALTREX) 600 mg (1,500 mg) tablet Take 600 mg by mouth two (2) times a day.       cholecalciferol, vitamin D3, (VITAMIN D3) 2,000 unit tab Take  by mouth. Allergies   Allergen Reactions    Penicillins Unknown (comments)     Past Medical History:   Diagnosis Date    Acquired absence of both cervix and uterus 4/5/2012    Diabetes ([address]     Hx of bone density study     Osteopenia    Hx of mammogram 2/25/13    Negative    Hypercholesterolemia     Osteopenia 11/11    Mild    Pap smear for cervical cancer screening 5/8/06    Normal Pap    Symptomatic menopausal or female climacteric states 4/5/2012     Past Surgical History:   Procedure Laterality Date    HX BREAST BIOPSY      HX TOTAL ABDOMINAL HYSTERECTOMY  10/2003     Family History   Problem Relation Age of Onset    Diabetes Sister     Cancer Father      Lung/Prostate    MS Mother      Social History   Substance Use Topics    Smoking status: Never Smoker    Smokeless tobacco: Never Used    Alcohol use Not on file        Review of Systems    A comprehensive review of systems was negative except for that written in the HPI. Objective:     Visit Vitals    /78    Pulse 72    Temp 98.3 °F (36.8 °C) (Oral)    Resp 18    Ht 5' 3\" (1.6 m)    Wt 209 lb (94.8 kg)    BMI 37.02 kg/m2     General:  Alert, cooperative, no distress, appears stated age. Head:  Normocephalic, without obvious abnormality, atraumatic. Eyes:  Conjunctivae/corneas clear. PERRL, EOMs intact. Fundi benign. Ears:  Normal TMs and external ear canals both ears. Nose: Nares normal. Septum midline. Mucosa normal. No drainage or sinus tenderness. Throat: Lips, mucosa, and tongue normal. Teeth and gums normal.   Neck: Supple, symmetrical, trachea midline, no adenopathy, thyroid: no enlargement/tenderness/nodules, no carotid bruit and no JVD. Back:   Symmetric, no curvature. ROM normal. No CVA tenderness. Lungs:   Clear to auscultation bilaterally. Chest wall:  No tenderness or deformity.    Heart:  Regular rate and rhythm, S1, S2 normal, no murmur, click, rub or gallop. Abdomen:   Soft, non-tender. Bowel sounds normal. No masses,  No organomegaly. Extremities: Pain at [facility] joint left thumb   Pulses: 2+ and symmetric all extremities. Skin: Skin color, texture, turgor normal. No rashes or lesions. Lymph nodes: Cervical, supraclavicular, and axillary nodes normal.   Neurologic: CNII-XII intact. Normal strength, sensation and reflexes throughout. Assessment/Plan:       ICD-10-CM ICD-9-CM    1. Controlled type 2 diabetes mellitus without complication, without long-term current use of insulin ([facility]) J88.0 858.33 METABOLIC PANEL, COMPREHENSIVE      AMB POC HEMOGLOBIN A1C      MICROALBUMIN, UR, RAND W/ MICROALB/CREAT RATIO   2. Hyperlipidemia, unspecified hyperlipidemia type E96.2 959.1 METABOLIC PANEL, COMPREHENSIVE      LIPID PANEL   3. Essential hypertension with goal blood pressure less than 140/90 O06 166.4 METABOLIC PANEL, COMPREHENSIVE   4. Acquired hypothyroidism E03.9 244.9 T4, FREE      TSH 3RD GENERATION   5. Allergic rhinitis, unspecified seasonality, unspecified trigger J30.9 477.9 flonase   6. Osteopenia, unspecified location M85.80 733.90 Take calcium and vit D   7. BMI 37.0-37.9, adult Z68.37 V85.37 Continue to work on weight loss   8. Pain of left thumb  Likely osteoarthritis M79.645 729.5 Tylenol       Follow-up Disposition: Not on File   Advised her to call back or return to office if symptoms worsen/change/persist.  Discussed expected course/resolution/complications of diagnosis in detail with patient. Medication risks/benefits/costs/interactions/alternatives discussed with patient. She was given an after visit summary which includes diagnoses, current medications, & vitals. She expressed understanding with the diagnosis and plan.

## 2018-04-17 NOTE — PROGRESS NOTES
Chief Complaint   Patient presents with    Diabetes     1. Have you been to the ER, urgent care clinic since your last visit? Hospitalized since your last visit? No    2. Have you seen or consulted any other health care providers outside of the [facility] since your last visit? Include any pap smears or colon screening.  No

## 2018-04-17 NOTE — MR AVS SNAPSHOT
[address] [place] [address] 
[phone] Patient: Lobo Mcdonald MRN: [id number] SGD:8/3/4395 Visit Information Date & Time Provider Department Dept. Phone Encounter #  
 4/17/2018  9:15 AM Moustapha Jacob MD [address] [place] [address] Internal Medicine [phone] [id number] Follow-up Instructions Return in about 3 months (around 7/17/2018) for follow up diabetes. Upcoming Health Maintenance Date Due  
 FOOT EXAM Q1 5/6/1964 EYE EXAM RETINAL OR DILATED Q1 5/6/1964 FOBT Q 1 YEAR AGE 50-75 5/6/2004 PAP AKA CERVICAL CYTOLOGY 5/8/2009 ZOSTER VACCINE AGE 60> 3/6/2014 MICROALBUMIN Q1 12/28/2017 HEMOGLOBIN A1C Q6M 6/4/2018 LIPID PANEL Q1 12/4/2018 BREAST CANCER SCRN MAMMOGRAM 3/21/2019 DTaP/Tdap/Td series (2 - Td) 9/9/2026 Allergies as of 4/17/2018  Review Complete On: 4/17/2018 By: Stuart Velázquez LPN Severity Noted Reaction Type Reactions Penicillins  01/02/2014    Unknown (comments) Current Immunizations  Never Reviewed Name Date Influenza Vaccine (Quad) PF 12/4/2017, 9/9/2016, 11/25/2015 Pneumococcal Polysaccharide (PPSV-23) 7/15/2015 Tdap 9/9/2016 Not reviewed this visit You Were Diagnosed With   
  
 Codes Comments Controlled type 2 diabetes mellitus without complication, without long-term current use of insulin ([facility]    -  Primary ICD-10-CM: E11.9 ICD-9-CM: 250.00 Hyperlipidemia, unspecified hyperlipidemia type     ICD-10-CM: E78.5 ICD-9-CM: 272.4 Essential hypertension with goal blood pressure less than 140/90     ICD-10-CM: I10 
ICD-9-CM: 401.9 Acquired hypothyroidism     ICD-10-CM: E03.9 ICD-9-CM: 244.9 Allergic rhinitis, unspecified seasonality, unspecified trigger     ICD-10-CM: J30.9 ICD-9-CM: 477.9 Osteopenia, unspecified location     ICD-10-CM: M85.80 ICD-9-CM: 733.90  BMI 37.0-37.9, adult     ICD-10-CM: Z68.37 
 ICD-9-CM: V85.37 Pain of left thumb     ICD-10-CM: B95.694 ICD-9-CM: 729.5 Vitals BP Pulse Temp Resp Height(growth percentile) Weight(growth percentile)  
 137/78 72 98.3 °F (36.8 °C) (Oral) 18 5' 3\" (1.6 m) 209 lb (94.8 kg) BMI OB Status Smoking Status 37.02 kg/m2 Hysterectomy Never Smoker Vitals History BMI and BSA Data Body Mass Index Body Surface Area 37.02 kg/m 2 2.05 m 2 Preferred Pharmacy Pharmacy Name Phone [address] [place] [address], [facility] Janey Turner [phone] Your Updated Medication List  
  
   
This list is accurate as of 4/17/18 10:14 AM.  Always use your most recent med list.  
  
  
  
  
 atorvastatin 10 mg tablet Commonly known as:  LIPITOR  
TAKE 1 TABLET BY MOUTH DAILY Blood-Glucose Meter monitoring kit One touch ultra meter  
  
 calcium carbonate 600 mg calcium (1,500 mg) tablet Commonly known as:  Staci Blanks Take 600 mg by mouth two (2) times a day. FLONASE ALLERGY RELIEF 50 mcg/actuation nasal spray Generic drug:  fluticasone 2 Sprays by Both Nostrils route daily. levothyroxine 25 mcg tablet Commonly known as:  SYNTHROID  
TAKE 1 TABLET BY MOUTH DAILY losartan 25 mg tablet Commonly known as:  COZAAR  
TAKE 1 TABLET BY MOUTH DAILY  
  
 metFORMIN 500 mg tablet Commonly known as:  GLUCOPHAGE  
TAKE 1 TABLET BY MOUTH TWICE DAILY  
  
 VITAMIN D3 2,000 unit Tab Generic drug:  cholecalciferol (vitamin D3) Take  by mouth. We Performed the Following AMB POC HEMOGLOBIN A1C [53171 CPT(R)] LIPID PANEL [61540 CPT(R)] METABOLIC PANEL, COMPREHENSIVE [78507 CPT(R)] MICROALBUMIN, UR, RAND W/ MICROALB/CREAT RATIO [id number] CPT(R)] T4, FREE [id number] CPT(R)] TSH 3RD GENERATION [29100 CPT(R)] Follow-up Instructions Return in about 3 months (around 7/17/2018) for follow up diabetes. Introducing [facility] & HEALTH SERVICES! Dear Rosalind Moody: Thank you for requesting a [employer] account. Our records indicate that you already have an active [employer] account. You can access your account anytime at [email or web address] Did you know that you can access your hospital and ER discharge instructions at any time in [employer]? You can also review all of your test results from your hospital stay or ER visit. Additional Information If you have questions, please visit the Frequently Asked Questions section of the [employer] website at [email or web address] Remember, [employer] is NOT to be used for urgent needs. For medical emergencies, dial 911. Now available from your iPhone and Android! Please provide this summary of care documentation to your next provider. Your primary care clinician is listed as [facility]. If you have any questions after today's visit, please call [phone].

## 2018-04-18 LAB
ALBUMIN SERPL-MCNC: 4.5 G/DL (ref 3.6–4.8)
ALBUMIN/CREAT UR: 7.3 MG/G CREAT (ref 0–30)
ALBUMIN/GLOB SERPL: 1.4 {RATIO} (ref 1.2–2.2)
ALP SERPL-CCNC: 71 IU/L (ref 39–117)
ALT SERPL-CCNC: 26 IU/L (ref 0–32)
AST SERPL-CCNC: 21 IU/L (ref 0–40)
BILIRUB SERPL-MCNC: 0.3 MG/DL (ref 0–1.2)
BUN SERPL-MCNC: 12 MG/DL (ref 8–27)
BUN/CREAT SERPL: 19 (ref 12–28)
CALCIUM SERPL-MCNC: 9.2 MG/DL (ref 8.7–10.3)
CHLORIDE SERPL-SCNC: 98 MMOL/L (ref 96–106)
CHOLEST SERPL-MCNC: 137 MG/DL (ref 100–199)
CO2 SERPL-SCNC: 24 MMOL/L (ref 18–29)
CREAT SERPL-MCNC: 0.63 MG/DL (ref 0.57–1)
CREAT UR-MCNC: 80.6 MG/DL
GFR SERPLBLD CREATININE-BSD FMLA CKD-EPI: 110 ML/MIN/1.73
GFR SERPLBLD CREATININE-BSD FMLA CKD-EPI: 96 ML/MIN/1.73
GLOBULIN SER CALC-MCNC: 3.2 G/DL (ref 1.5–4.5)
GLUCOSE SERPL-MCNC: 103 MG/DL (ref 65–99)
HDLC SERPL-MCNC: 32 MG/DL
LDLC SERPL CALC-MCNC: 81 MG/DL (ref 0–99)
MICROALBUMIN UR-MCNC: 5.9 UG/ML
POTASSIUM SERPL-SCNC: 4.3 MMOL/L (ref 3.5–5.2)
PROT SERPL-MCNC: 7.7 G/DL (ref 6–8.5)
SODIUM SERPL-SCNC: 140 MMOL/L (ref 134–144)
T4 FREE SERPL-MCNC: 1.39 NG/DL (ref 0.82–1.77)
TRIGL SERPL-MCNC: 118 MG/DL (ref 0–149)
TSH SERPL DL<=0.005 MIU/L-ACNC: 0.61 UIU/ML (ref 0.45–4.5)
VLDLC SERPL CALC-MCNC: 24 MG/DL (ref 5–40)

## 2018-04-19 RX ORDER — LOSARTAN POTASSIUM 25 MG/1
TABLET ORAL
Qty: 90 TAB | Refills: 0 | Status: SHIPPED | OUTPATIENT
Start: 2018-04-19 | End: 2018-08-24 | Stop reason: SDUPTHER

## 2018-04-19 RX ORDER — METFORMIN HYDROCHLORIDE 500 MG/1
TABLET ORAL
Qty: 180 TAB | Refills: 0 | Status: SHIPPED | OUTPATIENT
Start: 2018-04-19 | End: 2018-08-24 | Stop reason: SDUPTHER

## 2018-04-19 RX ORDER — ATORVASTATIN CALCIUM 10 MG/1
TABLET, FILM COATED ORAL
Qty: 90 TAB | Refills: 0 | Status: SHIPPED | OUTPATIENT
Start: 2018-04-19 | End: 2018-08-24 | Stop reason: SDUPTHER

## 2018-04-19 RX ORDER — LEVOTHYROXINE SODIUM 25 UG/1
TABLET ORAL
Qty: 90 TAB | Refills: 0 | Status: SHIPPED | OUTPATIENT
Start: 2018-04-19 | End: 2018-08-24 | Stop reason: SDUPTHER

## 2018-09-07 ENCOUNTER — OFFICE VISIT (OUTPATIENT)
Dept: INTERNAL MEDICINE CLINIC | Facility: CLINIC | Age: 63
End: 2018-09-07

## 2018-09-07 VITALS
BODY MASS INDEX: 37.92 KG/M2 | DIASTOLIC BLOOD PRESSURE: 84 MMHG | HEART RATE: 81 BPM | RESPIRATION RATE: 18 BRPM | HEIGHT: 63 IN | SYSTOLIC BLOOD PRESSURE: 149 MMHG | WEIGHT: 214 LBS | TEMPERATURE: 98.8 F

## 2018-09-07 DIAGNOSIS — E03.9 HYPOTHYROIDISM, UNSPECIFIED TYPE: ICD-10-CM

## 2018-09-07 DIAGNOSIS — I10 ESSENTIAL HYPERTENSION WITH GOAL BLOOD PRESSURE LESS THAN 140/90: ICD-10-CM

## 2018-09-07 DIAGNOSIS — E11.9 CONTROLLED TYPE 2 DIABETES MELLITUS WITHOUT COMPLICATION, WITHOUT LONG-TERM CURRENT USE OF INSULIN (HCC): Primary | ICD-10-CM

## 2018-09-07 DIAGNOSIS — R31.9 URINARY TRACT INFECTION WITH HEMATURIA, SITE UNSPECIFIED: ICD-10-CM

## 2018-09-07 DIAGNOSIS — E78.5 HYPERLIPIDEMIA, UNSPECIFIED HYPERLIPIDEMIA TYPE: ICD-10-CM

## 2018-09-07 DIAGNOSIS — R05.9 COUGH: ICD-10-CM

## 2018-09-07 DIAGNOSIS — K21.9 GASTROESOPHAGEAL REFLUX DISEASE WITHOUT ESOPHAGITIS: ICD-10-CM

## 2018-09-07 DIAGNOSIS — N39.0 URINARY TRACT INFECTION WITH HEMATURIA, SITE UNSPECIFIED: ICD-10-CM

## 2018-09-07 LAB
BILIRUB UR QL STRIP: NEGATIVE
GLUCOSE UR-MCNC: NEGATIVE MG/DL
HBA1C MFR BLD HPLC: 6.5 %
KETONES P FAST UR STRIP-MCNC: NEGATIVE MG/DL
PH UR STRIP: 6.5 [PH] (ref 4.6–8)
PROT UR QL STRIP: NEGATIVE
SP GR UR STRIP: 1.02 (ref 1–1.03)
UA UROBILINOGEN AMB POC: NORMAL (ref 0.2–1)
URINALYSIS CLARITY POC: CLEAR
URINALYSIS COLOR POC: YELLOW
URINE BLOOD POC: NEGATIVE
URINE LEUKOCYTES POC: NEGATIVE
URINE NITRITES POC: NEGATIVE

## 2018-09-07 NOTE — PROGRESS NOTES
Subjective:  
  
Jeny Dixon is a 58 y.o. female who presents today for Chief Complaint Patient presents with  Diabetes  Cough 1) patient is diabetic. She takes metformin. Her  A1c  6.5  today. microalbumin update 4/2018. She does take losartan 25 mg po daily. She checks her blood sugars sporadically. Fasting  Has been around  114 -115. . She does take flu and pneumovax and are up to date. She sees ophthalmologist yearly. She does not see a podiatrist. 
2) hyperlipidemia: takes atorvastatin daily, no side effects 3)patient will be going back to [employer] She walks for exercise. Weight has decreased from 215 to 209 an now back up to 214 
4) hypothyroid- will need to check labs today 5) allergic rhinitis- flonase 6) osteopenia- takes calcium and vit D  
 
 
Patient recently having severe heartburn and was taking TUMS. She is now on Prilosec. She is feeing better. Patient recently treated for UTI. She completed abx last Monday. She has been on macrobid twice daily Patient Active Problem List  
 Diagnosis Date Noted  Diabetes mellitus type 2, controlled ([facility] 09/09/2016  Encounter for immunization 09/09/2016  Hyperlipidemia 09/09/2016  Essential hypertension with goal blood pressure less than 140/90 09/09/2016  Gastroesophageal reflux disease without esophagitis 09/09/2016  Symptomatic menopausal or female climacteric states 04/05/2012  Acquired absence of both cervix and uterus 04/05/2012 Current Outpatient Prescriptions Medication Sig Dispense Refill  losartan (COZAAR) 25 mg tablet Take 1 Tab by mouth daily. 90 Tab 0  
 atorvastatin (LIPITOR) 10 mg tablet Take 1 Tab by mouth daily. 90 Tab 0  
 metFORMIN (GLUCOPHAGE) 500 mg tablet Take 1 Tab by mouth two (2) times daily (with meals). 180 Tab 0  
 levothyroxine (SYNTHROID) 25 mcg tablet Take 1 Tab by mouth daily.  90 Tab 0  
 fluticasone (FLONASE ALLERGY RELIEF) 50 mcg/actuation nasal spray 2 Sprays by Both Nostrils route daily.  Blood-Glucose Meter monitoring kit One touch ultra meter 1 Kit 0  
 calcium carbonate (CALTREX) 600 mg (1,500 mg) tablet Take 600 mg by mouth two (2) times a day.  cholecalciferol, vitamin D3, (VITAMIN D3) 2,000 unit tab Take  by mouth. Allergies Allergen Reactions  Penicillins Unknown (comments) Past Medical History:  
Diagnosis Date  Acquired absence of both cervix and uterus 4/5/2012  Diabetes ([address]  Hx of bone density study Osteopenia  Hx of mammogram 2/25/13 Negative  Hypercholesterolemia  Osteopenia 11/11 Mild  Pap smear for cervical cancer screening 5/8/06 Normal Pap  Symptomatic menopausal or female climacteric states 4/5/2012 Past Surgical History:  
Procedure Laterality Date  HX BREAST BIOPSY  HX TOTAL ABDOMINAL HYSTERECTOMY  10/2003 Family History Problem Relation Age of Onset  Diabetes Sister  Cancer Father   
  Lung/Prostate  MS Mother Social History Substance Use Topics  Smoking status: Never Smoker  Smokeless tobacco: Never Used  Alcohol use Not on file Review of Systems A comprehensive review of systems was negative except for that written in the HPI. Objective:  
 
Visit Vitals  /84  Pulse 81  Temp 98.8 °F (37.1 °C) (Oral)  Resp 18  Ht 5' 3\" (1.6 m)  Wt 214 lb (97.1 kg)  BMI 37.91 kg/m2 General:  Alert, cooperative, no distress, appears stated age. Head:  Normocephalic, without obvious abnormality, atraumatic. Eyes:  Conjunctivae/corneas clear. PERRL, EOMs intact. Fundi benign. Ears:  Normal TMs and external ear canals both ears. Nose: Nares normal. Septum midline. Mucosa normal. No drainage or sinus tenderness.   
Throat: Lips, mucosa, and tongue normal. Teeth and gums normal.  
Neck: Supple, symmetrical, trachea midline, no adenopathy, thyroid: no enlargement/tenderness/nodules, no carotid bruit and no JVD. Back:   Symmetric, no curvature. ROM normal. No CVA tenderness. Lungs:   Clear to auscultation bilaterally. Chest wall:  No tenderness or deformity. Heart:  Regular rate and rhythm, S1, S2 normal, no murmur, click, rub or gallop. Abdomen:   Soft, non-tender. Bowel sounds normal. No masses,  No organomegaly. Extremities: Extremities normal, atraumatic, no cyanosis or edema. Pulses: 2+ and symmetric all extremities. Skin: Skin color, texture, turgor normal. No rashes or lesions. Lymph nodes: Cervical, supraclavicular, and axillary nodes normal.  
Neurologic: CNII-XII intact. Normal strength, sensation and reflexes throughout. Assessment/Plan: ICD-10-CM ICD-9-CM 1. Controlled type 2 diabetes mellitus without complication, without long-term current use of insulin ([facility]) U11.5 034.87 METABOLIC PANEL, COMPREHENSIVE  
   AMB POC HEMOGLOBIN A1C 2. Hyperlipidemia, unspecified hyperlipidemia type Y65.2 680.9 METABOLIC PANEL, COMPREHENSIVE  
   LIPID PANEL 3. Essential hypertension with goal blood pressure less than 140/90 G52 169.9 METABOLIC PANEL, COMPREHENSIVE Increase losartan to 50 mg po daily 4. Gastroesophageal reflux disease without esophagitis K21.9 530.81 Continue PPIU 5. Hypothyroidism, unspecified type E03.9 244.9 Continue synthroid 6. Urinary tract infection with hematuria, site unspecified N39.0 599.0 AMB POC URINALYSIS DIP STICK AUTO W/O MICRO  
 R31.9  CULTURE, URINE 7. Cough R05 786.2 Follow-up Disposition: Not on File Advised her to call back or return to office if symptoms worsen/change/persist. 
Discussed expected course/resolution/complications of diagnosis in detail with patient. Medication risks/benefits/costs/interactions/alternatives discussed with patient. She was given an after visit summary which includes diagnoses, current medications, & vitals. She expressed understanding with the diagnosis and plan.

## 2018-09-07 NOTE — MR AVS SNAPSHOT
[address] [place] [address] 
[phone] Patient: Ashkan Osman MRN: [id number] NHB:0[birth year]67 Visit Information Date & Time Provider Department Dept. Phone Encounter #  
 9/7/2018  8:30 AM Aeln Kelley MD [address] [place] [address] Internal Medicine [phone] [id number] Follow-up Instructions Return in about 3 months (around 12/7/2018) for follow up diabetes check. Upcoming Health Maintenance Date Due  
 FOOT EXAM Q1 5/6/1965 FOBT Q 1 YEAR AGE 50-75 5/6/2004 PAP AKA CERVICAL CYTOLOGY 5/8/2009 ZOSTER VACCINE AGE 60> 3/6/2014 Influenza Age 5 to Adult 8/1/2018 HEMOGLOBIN A1C Q6M 10/17/2018 MICROALBUMIN Q1 4/17/2019 LIPID PANEL Q1 4/17/2019 EYE EXAM RETINAL OR DILATED Q1 6/14/2019 BREAST CANCER SCRN MAMMOGRAM 5/14/2020 DTaP/Tdap/Td series (2 - Td) 9/9/2026 Allergies as of 9/7/2018  Review Complete On: 9/7/2018 By: Sánchez Pederson LPN Severity Noted Reaction Type Reactions Penicillins  01/02/2014    Unknown (comments) Current Immunizations  Never Reviewed Name Date Influenza Vaccine (Quad) PF 12/4/2017, 9/9/2016, 11/25/2015 Pneumococcal Polysaccharide (PPSV-23) 7/15/2015 Tdap 9/9/2016 Not reviewed this visit You Were Diagnosed With   
  
 Codes Comments Controlled type 2 diabetes mellitus without complication, without long-term current use of insulin ([facility]    -  Primary ICD-10-CM: E11.9 ICD-9-CM: 250.00 Hyperlipidemia, unspecified hyperlipidemia type     ICD-10-CM: E78.5 ICD-9-CM: 272.4 Essential hypertension with goal blood pressure less than 140/90     ICD-10-CM: I10 
ICD-9-CM: 401.9 Gastroesophageal reflux disease without esophagitis     ICD-10-CM: K21.9 ICD-9-CM: 530.81 Hypothyroidism, unspecified type     ICD-10-CM: E03.9 ICD-9-CM: 244.9 Urinary tract infection with hematuria, site unspecified     ICD-10-CM: N39.0, R31.9 ICD-9-CM: 599.0 Vitals BP Pulse Temp Resp Height(growth percentile) Weight(growth percentile) 149/84 81 98.8 °F (37.1 °C) (Oral) 18 5' 3\" (1.6 m) 214 lb (97.1 kg) BMI OB Status Smoking Status 37.91 kg/m2 Hysterectomy Never Smoker Vitals History BMI and BSA Data Body Mass Index Body Surface Area  
 37.91 kg/m 2 2.08 m 2 Preferred Pharmacy Pharmacy Name Phone [address] [facility], [facility] Janey Turner [phone] Your Updated Medication List  
  
   
This list is accurate as of 9/7/18  9:08 AM.  Always use your most recent med list.  
  
  
  
  
 atorvastatin 10 mg tablet Commonly known as:  LIPITOR Take 1 Tab by mouth daily. Blood-Glucose Meter monitoring kit One touch ultra meter  
  
 calcium carbonate 600 mg calcium (1,500 mg) tablet Commonly known as:  Tomma Boor Take 600 mg by mouth two (2) times a day. FLONASE ALLERGY RELIEF 50 mcg/actuation nasal spray Generic drug:  fluticasone 2 Sprays by Both Nostrils route daily. levothyroxine 25 mcg tablet Commonly known as:  SYNTHROID Take 1 Tab by mouth daily. losartan 25 mg tablet Commonly known as:  COZAAR Take 1 Tab by mouth daily. metFORMIN 500 mg tablet Commonly known as:  GLUCOPHAGE Take 1 Tab by mouth two (2) times daily (with meals). VITAMIN D3 2,000 unit Tab Generic drug:  cholecalciferol (vitamin D3) Take  by mouth. We Performed the Following AMB POC HEMOGLOBIN A1C [95812 CPT(R)] AMB POC URINALYSIS DIP STICK AUTO W/O MICRO [14776 CPT(R)] CULTURE, URINE [id number] CPT(R)] LIPID PANEL [96047 CPT(R)] METABOLIC PANEL, COMPREHENSIVE [75554 CPT(R)] Follow-up Instructions Return in about 3 months (around 12/7/2018) for follow up diabetes check. Introducing [facility] & [facility]! Dear Elsa Thayer: Thank you for requesting a [employer] account.   Our records indicate that you already have an active [employer] account. You can access your account anytime at [email or web address] Did you know that you can access your hospital and ER discharge instructions at any time in [employer]? You can also review all of your test results from your hospital stay or ER visit. Additional Information If you have questions, please visit the Frequently Asked Questions section of the [employer] website at [email or web address] Remember, [employer] is NOT to be used for urgent needs. For medical emergencies, dial 911. Now available from your iPhone and Android! Please provide this summary of care documentation to your next provider. Your primary care clinician is listed as [facility]. If you have any questions after today's visit, please call [phone].

## 2018-09-07 NOTE — PROGRESS NOTES
Chief Complaint Patient presents with  Diabetes  Cough 1. Have you been to the ER, urgent care clinic since your last visit? Hospitalized since your last visit? Yes Where: [facility] Reason for visit: Bladder infection 2. Have you seen or consulted any other health care providers outside of the [address] [place] [address] since your last visit? Include any pap smears or colon screening. Yes When: August [facility]: [facility]

## 2018-09-09 LAB
ALBUMIN SERPL-MCNC: 4.3 G/DL (ref 3.6–4.8)
ALBUMIN/GLOB SERPL: 1.4 {RATIO} (ref 1.2–2.2)
ALP SERPL-CCNC: 71 IU/L (ref 39–117)
ALT SERPL-CCNC: 25 IU/L (ref 0–32)
AST SERPL-CCNC: 23 IU/L (ref 0–40)
BACTERIA UR CULT: NORMAL
BILIRUB SERPL-MCNC: 0.2 MG/DL (ref 0–1.2)
BUN SERPL-MCNC: 11 MG/DL (ref 8–27)
BUN/CREAT SERPL: 16 (ref 12–28)
CALCIUM SERPL-MCNC: 9.2 MG/DL (ref 8.7–10.3)
CHLORIDE SERPL-SCNC: 102 MMOL/L (ref 96–106)
CHOLEST SERPL-MCNC: 130 MG/DL (ref 100–199)
CO2 SERPL-SCNC: 23 MMOL/L (ref 20–29)
CREAT SERPL-MCNC: 0.67 MG/DL (ref 0.57–1)
GLOBULIN SER CALC-MCNC: 3.1 G/DL (ref 1.5–4.5)
GLUCOSE SERPL-MCNC: 107 MG/DL (ref 65–99)
HDLC SERPL-MCNC: 35 MG/DL
LDLC SERPL CALC-MCNC: 70 MG/DL (ref 0–99)
POTASSIUM SERPL-SCNC: 4.3 MMOL/L (ref 3.5–5.2)
PROT SERPL-MCNC: 7.4 G/DL (ref 6–8.5)
SODIUM SERPL-SCNC: 141 MMOL/L (ref 134–144)
TRIGL SERPL-MCNC: 123 MG/DL (ref 0–149)
VLDLC SERPL CALC-MCNC: 25 MG/DL (ref 5–40)

## 2018-09-13 NOTE — PROGRESS NOTES
Normal kidney and liver function Lipid panel is stable Urine culture shows urinary  infection has cleared up

## 2018-12-06 RX ORDER — METFORMIN HYDROCHLORIDE 500 MG/1
TABLET ORAL
Qty: 180 TAB | Refills: 3 | Status: SHIPPED | OUTPATIENT
Start: 2018-12-06 | End: 2019-12-17 | Stop reason: SDUPTHER

## 2018-12-06 RX ORDER — LEVOTHYROXINE SODIUM 25 UG/1
TABLET ORAL
Qty: 90 TAB | Refills: 3 | Status: SHIPPED | OUTPATIENT
Start: 2018-12-06 | End: 2019-12-17 | Stop reason: SDUPTHER

## 2018-12-06 RX ORDER — ATORVASTATIN CALCIUM 10 MG/1
TABLET, FILM COATED ORAL
Qty: 90 TAB | Refills: 3 | Status: SHIPPED | OUTPATIENT
Start: 2018-12-06 | End: 2019-12-17 | Stop reason: SDUPTHER

## 2018-12-06 RX ORDER — LOSARTAN POTASSIUM 25 MG/1
TABLET ORAL
Qty: 90 TAB | Refills: 3 | Status: SHIPPED | OUTPATIENT
Start: 2018-12-06 | End: 2019-12-17 | Stop reason: SDUPTHER

## 2019-04-05 ENCOUNTER — OFFICE VISIT (OUTPATIENT)
Dept: INTERNAL MEDICINE CLINIC | Facility: CLINIC | Age: 64
End: 2019-04-05

## 2019-04-05 VITALS
TEMPERATURE: 98.3 F | BODY MASS INDEX: 37.56 KG/M2 | SYSTOLIC BLOOD PRESSURE: 121 MMHG | HEIGHT: 63 IN | HEART RATE: 77 BPM | WEIGHT: 212 LBS | DIASTOLIC BLOOD PRESSURE: 79 MMHG | RESPIRATION RATE: 16 BRPM

## 2019-04-05 DIAGNOSIS — E11.21 CONTROLLED TYPE 2 DIABETES MELLITUS WITH DIABETIC NEPHROPATHY, WITHOUT LONG-TERM CURRENT USE OF INSULIN (HCC): ICD-10-CM

## 2019-04-05 DIAGNOSIS — E78.00 PURE HYPERCHOLESTEROLEMIA: ICD-10-CM

## 2019-04-05 DIAGNOSIS — I10 ESSENTIAL HYPERTENSION WITH GOAL BLOOD PRESSURE LESS THAN 140/90: Primary | ICD-10-CM

## 2019-04-05 DIAGNOSIS — E03.9 HYPOTHYROIDISM, UNSPECIFIED TYPE: ICD-10-CM

## 2019-04-05 DIAGNOSIS — K21.9 GASTROESOPHAGEAL REFLUX DISEASE WITHOUT ESOPHAGITIS: ICD-10-CM

## 2019-04-05 PROBLEM — E66.01 SEVERE OBESITY (HCC): Status: ACTIVE | Noted: 2019-04-05

## 2019-04-05 LAB — HBA1C MFR BLD HPLC: 6.7 %

## 2019-04-05 NOTE — PROGRESS NOTES
Subjective:      Cuba Varela is a 58 y.o. female who presents today for   Chief Complaint   Patient presents with    Diabetes   1) patient is diabetic. She takes metformin. A1c 6.7 today  Doing NOOM program to help with weight loss. She walks for exercise    microalbumin updated today. She does take losartan 25 mg po daily. She checks her blood sugars sporadically. Fasting  Has been around  130-148  She does take flu and pneumovax and are up to date. She sees ophthalmologist yearly. She does not see a podiatrist.   hyperlipidemia: takes atorvastatin daily, no side effects     hypothyroid- will need to check labs today    allergic rhinitis- flonase as needed    osteopenia- takes calcium and vit D      Hx of GERD- responded to prilosec, symptoms are stable             Patient Active Problem List    Diagnosis Date Noted    Severe obesity ([facility] 04/05/2019    Diabetes mellitus type 2, controlled ([facility] 09/09/2016    Encounter for immunization 09/09/2016    Hyperlipidemia 09/09/2016    Essential hypertension with goal blood pressure less than 140/90 09/09/2016    Gastroesophageal reflux disease without esophagitis 09/09/2016    Symptomatic menopausal or female climacteric states 04/05/2012    Acquired absence of both cervix and uterus 04/05/2012     Current Outpatient Medications   Medication Sig Dispense Refill    losartan (COZAAR) 25 mg tablet TAKE 1 TABLET BY MOUTH DAILY 90 Tab 3    atorvastatin (LIPITOR) 10 mg tablet TAKE 1 TABLET BY MOUTH DAILY 90 Tab 3    metFORMIN (GLUCOPHAGE) 500 mg tablet TAKE 1 TABLET BY MOUTH TWICE DAILY WITH MEALS 180 Tab 3    levothyroxine (SYNTHROID) 25 mcg tablet TAKE 1 TABLET BY MOUTH DAILY 90 Tab 3    fluticasone (FLONASE ALLERGY RELIEF) 50 mcg/actuation nasal spray 2 Sprays by Both Nostrils route daily.       Blood-Glucose Meter monitoring kit One touch ultra meter 1 Kit 0    calcium carbonate (CALTREX) 600 mg (1,500 mg) tablet Take 600 mg by mouth two (2) times a day.      cholecalciferol, vitamin D3, (VITAMIN D3) 2,000 unit tab Take  by mouth. Allergies   Allergen Reactions    Penicillins Unknown (comments)     Past Medical History:   Diagnosis Date    Acquired absence of both cervix and uterus 4/5/2012    Diabetes ([facility]     Hx of bone density study     Osteopenia    Hx of mammogram 2/25/13    Negative    Hypercholesterolemia     Osteopenia 11/11    Mild    Pap smear for cervical cancer screening 5/8/06    Normal Pap    Symptomatic menopausal or female climacteric states 4/5/2012     Past Surgical History:   Procedure Laterality Date    HX BREAST BIOPSY      HX TOTAL ABDOMINAL HYSTERECTOMY  10/2003     Family History   Problem Relation Age of Onset    Diabetes Sister     Cancer Father         Lung/Prostate    MS Mother      Social History     Tobacco Use    Smoking status: Never Smoker    Smokeless tobacco: Never Used   Substance Use Topics    Alcohol use: Not on file        Review of Systems    A comprehensive review of systems was negative except for that written in the HPI. Objective:     Visit Vitals  /79   Pulse 77   Temp 98.3 °F (36.8 °C) (Oral)   Resp 16   Ht 5' 3\" (1.6 m)   Wt 212 lb (96.2 kg)   BMI 37.55 kg/m²     General:  Alert, cooperative, no distress, appears stated age. Head:  Normocephalic, without obvious abnormality, atraumatic. Eyes:  Conjunctivae/corneas clear. PERRL, EOMs intact. Fundi benign. Ears:  Normal TMs and external ear canals both ears. Nose: Nares normal. Septum midline. Mucosa normal. No drainage or sinus tenderness. Throat: Lips, mucosa, and tongue normal. Teeth and gums normal.   Neck: Supple, symmetrical, trachea midline, no adenopathy, thyroid: no enlargement/tenderness/nodules, no carotid bruit and no JVD. Back:   Symmetric, no curvature. ROM normal. No CVA tenderness. Lungs:   Clear to auscultation bilaterally. Chest wall:  No tenderness or deformity.    Heart:  Regular rate and rhythm, S1, S2 normal, no murmur, click, rub or gallop. Abdomen:   Soft, non-tender. Bowel sounds normal. No masses,  No organomegaly. Extremities: Extremities normal, atraumatic, no cyanosis or edema. Pulses: 2+ and symmetric all extremities. Skin: Skin color, texture, turgor normal. No rashes or lesions. Lymph nodes: Cervical, supraclavicular, and axillary nodes normal.   Neurologic: CNII-XII intact. Normal strength, sensation and reflexes throughout. Assessment/Plan:     Hypertension- stable    GERD- stable, continue prilosec as needed    Type 2 DM- taking metformin  Check labs today  Continue diet and exercise plans    Hyperlipidemia- continue atorvastatin check labs today    Hypothyroid- check labs   Continue levothyroxine        Advised her to call back or return to office if symptoms worsen/change/persist.  Discussed expected course/resolution/complications of diagnosis in detail with patient. Medication risks/benefits/costs/interactions/alternatives discussed with patient. She was given an after visit summary which includes diagnoses, current medications, & vitals. She expressed understanding with the diagnosis and plan.

## 2019-04-05 NOTE — PROGRESS NOTES
Chief Complaint   Patient presents with    Diabetes     1. Have you been to the ER, urgent care clinic since your last visit? Hospitalized since your last visit? No    2. Have you seen or consulted any other health care providers outside of the [address] [place] [address] since your last visit? Include any pap smears or colon screening.  No

## 2019-04-08 LAB
ALBUMIN SERPL-MCNC: 4.5 G/DL (ref 3.6–4.8)
ALBUMIN/CREAT UR: 14 MG/G CREAT (ref 0–30)
ALBUMIN/GLOB SERPL: 1.4 {RATIO} (ref 1.2–2.2)
ALP SERPL-CCNC: 72 IU/L (ref 39–117)
ALT SERPL-CCNC: 32 IU/L (ref 0–32)
AST SERPL-CCNC: 23 IU/L (ref 0–40)
BILIRUB SERPL-MCNC: 0.3 MG/DL (ref 0–1.2)
BUN SERPL-MCNC: 10 MG/DL (ref 8–27)
BUN/CREAT SERPL: 16 (ref 12–28)
CALCIUM SERPL-MCNC: 9.5 MG/DL (ref 8.7–10.3)
CHLORIDE SERPL-SCNC: 101 MMOL/L (ref 96–106)
CHOLEST SERPL-MCNC: 119 MG/DL (ref 100–199)
CO2 SERPL-SCNC: 24 MMOL/L (ref 20–29)
CREAT SERPL-MCNC: 0.62 MG/DL (ref 0.57–1)
CREAT UR-MCNC: 141 MG/DL
GLOBULIN SER CALC-MCNC: 3.2 G/DL (ref 1.5–4.5)
GLUCOSE SERPL-MCNC: 108 MG/DL (ref 65–99)
HDLC SERPL-MCNC: 31 MG/DL
LDLC SERPL CALC-MCNC: 65 MG/DL (ref 0–99)
MICROALBUMIN UR-MCNC: 19.8 UG/ML
POTASSIUM SERPL-SCNC: 4.1 MMOL/L (ref 3.5–5.2)
PROT SERPL-MCNC: 7.7 G/DL (ref 6–8.5)
SODIUM SERPL-SCNC: 142 MMOL/L (ref 134–144)
SPECIMEN STATUS REPORT, ROLRST: NORMAL
T4 FREE SERPL-MCNC: 1.31 NG/DL (ref 0.82–1.77)
TRIGL SERPL-MCNC: 113 MG/DL (ref 0–149)
TSH SERPL DL<=0.005 MIU/L-ACNC: 0.56 UIU/ML (ref 0.45–4.5)
VLDLC SERPL CALC-MCNC: 23 MG/DL (ref 5–40)

## 2019-04-15 NOTE — PROGRESS NOTES
Overall labs look fine  Normal microalbumin  Normal thyroid  Normal kidney and liver function  Lipid panel stable

## 2019-10-11 ENCOUNTER — OFFICE VISIT (OUTPATIENT)
Dept: INTERNAL MEDICINE CLINIC | Age: 64
End: 2019-10-11

## 2019-10-11 VITALS
BODY MASS INDEX: 37.56 KG/M2 | TEMPERATURE: 98.7 F | RESPIRATION RATE: 18 BRPM | HEIGHT: 63 IN | HEART RATE: 76 BPM | WEIGHT: 212 LBS | DIASTOLIC BLOOD PRESSURE: 81 MMHG | SYSTOLIC BLOOD PRESSURE: 127 MMHG

## 2019-10-11 DIAGNOSIS — E03.9 ACQUIRED HYPOTHYROIDISM: ICD-10-CM

## 2019-10-11 DIAGNOSIS — E03.9 HYPOTHYROIDISM, UNSPECIFIED TYPE: ICD-10-CM

## 2019-10-11 DIAGNOSIS — E78.00 PURE HYPERCHOLESTEROLEMIA: Primary | ICD-10-CM

## 2019-10-11 DIAGNOSIS — E11.9 CONTROLLED TYPE 2 DIABETES MELLITUS WITHOUT COMPLICATION, WITHOUT LONG-TERM CURRENT USE OF INSULIN (HCC): ICD-10-CM

## 2019-10-11 LAB — HBA1C MFR BLD HPLC: 6.5 %

## 2019-10-11 RX ORDER — LANCETS
EACH MISCELLANEOUS
Qty: 3 PACKAGE | Refills: 3 | Status: SHIPPED | OUTPATIENT
Start: 2019-10-11 | End: 2019-12-17 | Stop reason: SDUPTHER

## 2019-10-11 RX ORDER — LOSARTAN POTASSIUM 25 MG/1
TABLET ORAL
Qty: 90 TAB | Refills: 3 | Status: CANCELLED | OUTPATIENT
Start: 2019-10-11

## 2019-10-11 RX ORDER — ATORVASTATIN CALCIUM 10 MG/1
TABLET, FILM COATED ORAL
Qty: 90 TAB | Refills: 3 | Status: CANCELLED | OUTPATIENT
Start: 2019-10-11

## 2019-10-11 RX ORDER — FLUTICASONE PROPIONATE 50 MCG
2 SPRAY, SUSPENSION (ML) NASAL DAILY
Qty: 1 BOTTLE | Status: CANCELLED | OUTPATIENT
Start: 2019-10-11

## 2019-10-11 RX ORDER — LEVOTHYROXINE SODIUM 25 UG/1
TABLET ORAL
Qty: 90 TAB | Refills: 3 | Status: CANCELLED | OUTPATIENT
Start: 2019-10-11

## 2019-10-11 RX ORDER — METFORMIN HYDROCHLORIDE 500 MG/1
TABLET ORAL
Qty: 180 TAB | Refills: 3 | Status: CANCELLED | OUTPATIENT
Start: 2019-10-11

## 2019-10-11 NOTE — PROGRESS NOTES
Darren Mckeon is a 71 y.o. female and presents for Annual Medicare Wellness Visit. Assessment of cognitive impairment: Alert and oriented x 4. Abuse Screen:    Abuse Screening Questionnaire 10/11/2019   Do you ever feel afraid of your partner? N   Are you in a relationship with someone who physically or mentally threatens you? N   Is it safe for you to go home? Y       Depression Screen:   3 most recent PHQ Screens 10/11/2019   Little interest or pleasure in doing things Not at all   Feeling down, depressed, irritable, or hopeless Not at all   Total Score PHQ 2 0       Fall Risk Assessment:    Fall Risk Assessment, last 12 mths 10/11/2019   Able to walk? Yes   Fall in past 12 months? No       Activities of Daily Living:    ADL Assessment 10/11/2019   Feeding yourself No Help Needed   Getting from bed to chair No Help Needed   Getting dressed No Help Needed   Bathing or showering No Help Needed   Walk across the room (includes cane/walker) No Help Needed   Using the telphone No Help Needed   Taking your medications No Help Needed   Preparing meals No Help Needed   Managing money (expenses/bills) No Help Needed   Moderately strenuous housework (laundry) No Help Needed   Shopping for personal items (toiletries/medicines) No Help Needed   Shopping for groceries No Help Needed   Driving No Help Needed   Climbing a flight of stairs No Help Needed   Getting to places beyond walking distances No Help Needed       Health Maintenance:  Daily Low Dose Aspirin: yes  Bone Density: up to date  Glaucoma Screening: yes  Immunizations:    Tetanus: up to date. Influenza: up to date. Shingles:  Zostavax: unknown. Shingrix:order placed   Pneumovax:  up to date. Prevnar: unknown. Cancer screening:    Cervical: NA.  Breast: up to date. Colon: up to date. Prostate:  NA    Advance Care Planning:   End of Life Planning: has NO advanced directive  - add't info requested.  Referral to SW: yes, has NO advanced directive  - add't info provided, reviewed DNR/DNI and patient is not interested. Provided pt with \"Respecting Choices packet of Information\" no  Offered facilitator session with NN no     Medications/Allergies: Reviewed with patient  Prior to Admission medications    Medication Sig Start Date End Date Taking? Authorizing Provider   losartan (COZAAR) 25 mg tablet TAKE 1 TABLET BY MOUTH DAILY 12/6/18  Yes Finley Dakin, MD   atorvastatin (LIPITOR) 10 mg tablet TAKE 1 TABLET BY MOUTH DAILY 12/6/18  Yes Finley Dakin, MD   metFORMIN (GLUCOPHAGE) 500 mg tablet TAKE 1 TABLET BY MOUTH TWICE DAILY WITH MEALS 12/6/18  Yes Finley Dakin, MD   levothyroxine (SYNTHROID) 25 mcg tablet TAKE 1 TABLET BY MOUTH DAILY 12/6/18  Yes Finley Dakin, MD   fluticasone [facility] ALLERGY RELIEF) 50 mcg/actuation nasal spray 2 Sprays by Both Nostrils route daily. Yes Provider, Historical   Blood-Glucose Meter monitoring kit One touch ultra meter 1/15/17  Yes Finley Dakin, MD   calcium carbonate (CALTREX) 600 mg (1,500 mg) tablet Take 600 mg by mouth two (2) times a day. Yes Provider, Historical   cholecalciferol, vitamin D3, (VITAMIN D3) 2,000 unit tab Take  by mouth.    Yes Provider, Historical     Allergies   Allergen Reactions    Penicillins Unknown (comments)       PSH: Reviewed with patient  Past Surgical History:   Procedure Laterality Date    HX BREAST BIOPSY      HX TOTAL ABDOMINAL HYSTERECTOMY  10/2003        SH: Reviewed with patient  Social History     Tobacco Use    Smoking status: Never Smoker    Smokeless tobacco: Never Used   Substance Use Topics    Alcohol use: Not on file    Drug use: Not on file       FH: Reviewed with patient  Family History   Problem Relation Age of Onset    Diabetes Sister     Cancer Father         Lung/Prostate    MS Mother          Objective:  Visit Vitals  /81   Pulse 76   Temp 98.7 °F (37.1 °C) (Oral)   Resp 18   Ht 5' 3\" (1.6 m)   Wt 212 lb (96.2 kg)   BMI 37.55 kg/m²    Body mass index is 37.55 kg/m². Alcohol Risk Screen:   On any occasion during past 3 months, have you had more than 3 drinks (female) or 4 drinks (male) containing alcohol? No  Do you average more than 7 drinks (female) or 14 drinks (male) per week? No  Type and Amount: other      Tobacco Abuse:  No    Nutrition Screen:  eats three meals a day    Hearing Loss:  The patient needs further evaluation. Vision Loss:   Wears glasses, contact lenses, or have any other visual impairment  yes    Activities of Daily Living:  Self-care. Requires assistance with: no ADLs  Patient handle his/her own medications  yes Use of pill box  yes    Exercise:  Over the last 7 days how many days have you exercised? None      Current medical providers:    Patient Care Team:  Ayaka Faith MD as PCP - General (Internal Medicine)      Plan:      No orders of the defined types were placed in this encounter. Health Maintenance   Topic Date Due    FOOT EXAM Q1  05/06/1965    Shingrix Vaccine Age 50> (1 of 2) 05/06/2004    Pneumococcal 65+ years (1 of 2 - PCV13) 05/06/2019    Influenza Age 9 to Adult  08/01/2019    HEMOGLOBIN A1C Q6M  10/05/2019    MICROALBUMIN Q1  04/05/2020    LIPID PANEL Q1  04/05/2020    EYE EXAM RETINAL OR DILATED  06/14/2020    BREAST CANCER SCRN MAMMOGRAM  05/28/2021    GLAUCOMA SCREENING Q2Y  06/19/2021    COLONOSCOPY  04/24/2022    DTaP/Tdap/Td series (2 - Td) 09/09/2026    Hepatitis C Screening  Completed    Bone Densitometry (Dexa) Screening  Completed       *Patient verbalized understanding and agreement with the plan. A copy of the After Visit Summary with personalized health plan was given to the patient today. Physical Exam will be performed by PCP and documented under a separate Progress Note.

## 2019-10-11 NOTE — PROGRESS NOTES
Subjective:      [place] Hung is a 71 y.o. female who presents today for No chief complaint on file. prevnar 15 will obtain at pharmacy    Type 2 diabetes- A1c 6.5  It was 6.7 at the last visit     Morbid obesity-  Patient has gone back to weight watchers    Hypertension    Hypothyroid    hyperlipidemia        Patient Active Problem List    Diagnosis Date Noted    Severe obesity ([facility] [address] 04/05/2019    Diabetes mellitus type 2, controlled ([facility] 09/09/2016    Encounter for immunization 09/09/2016    Hyperlipidemia 09/09/2016    Essential hypertension with goal blood pressure less than 140/90 09/09/2016    Gastroesophageal reflux disease without esophagitis 09/09/2016    Symptomatic menopausal or female climacteric states 04/05/2012    Acquired absence of both cervix and uterus 04/05/2012     Current Outpatient Medications   Medication Sig Dispense Refill    losartan (COZAAR) 25 mg tablet TAKE 1 TABLET BY MOUTH DAILY 90 Tab 3    atorvastatin (LIPITOR) 10 mg tablet TAKE 1 TABLET BY MOUTH DAILY 90 Tab 3    metFORMIN (GLUCOPHAGE) 500 mg tablet TAKE 1 TABLET BY MOUTH TWICE DAILY WITH MEALS 180 Tab 3    levothyroxine (SYNTHROID) 25 mcg tablet TAKE 1 TABLET BY MOUTH DAILY 90 Tab 3    fluticasone (FLONASE ALLERGY RELIEF) 50 mcg/actuation nasal spray 2 Sprays by Both Nostrils route daily.  Blood-Glucose Meter monitoring kit One touch ultra meter 1 Kit 0    calcium carbonate (CALTREX) 600 mg (1,500 mg) tablet Take 600 mg by mouth two (2) times a day.  cholecalciferol, vitamin D3, (VITAMIN D3) 2,000 unit tab Take  by mouth.        Allergies   Allergen Reactions    Penicillins Unknown (comments)     Past Medical History:   Diagnosis Date    Acquired absence of both cervix and uterus 4/5/2012    Diabetes ([facility] [facility]     Hx of bone density study     Osteopenia    Hx of mammogram 2/25/13    Negative    Hypercholesterolemia     Osteopenia 11/11    Mild    Pap smear for cervical cancer screening 5/8/06    Normal Pap    Symptomatic menopausal or female climacteric states 4/5/2012     Past Surgical History:   Procedure Laterality Date    HX BREAST BIOPSY      HX TOTAL ABDOMINAL HYSTERECTOMY  10/2003     Family History   Problem Relation Age of Onset    Diabetes Sister     Cancer Father         Lung/Prostate    MS Mother      Social History     Tobacco Use    Smoking status: Never Smoker    Smokeless tobacco: Never Used   Substance Use Topics    Alcohol use: Not on file        Review of Systems    A comprehensive review of systems was negative except for that written in the HPI. Objective: There were no vitals taken for this visit. General:  Alert, cooperative, no distress, appears stated age. Head:  Normocephalic, without obvious abnormality, atraumatic. Eyes:  Conjunctivae/corneas clear. PERRL, EOMs intact. Fundi benign. Ears:  Normal TMs and external ear canals both ears. Nose: Nares normal. Septum midline. Mucosa normal. No drainage or sinus tenderness. Throat: Lips, mucosa, and tongue normal. Teeth and gums normal.   Neck: Supple, symmetrical, trachea midline, no adenopathy, thyroid: no enlargement/tenderness/nodules, no carotid bruit and no JVD. Back:   Symmetric, no curvature. ROM normal. No CVA tenderness. Lungs:   Clear to auscultation bilaterally. Chest wall:  No tenderness or deformity. Heart:  Regular rate and rhythm, S1, S2 normal, no murmur, click, rub or gallop. Abdomen:   Soft, non-tender. Bowel sounds normal. No masses,  No organomegaly. Extremities: Extremities normal, atraumatic, no cyanosis or edema. Pulses: 2+ and symmetric all extremities. Skin: Skin color, texture, turgor normal. No rashes or lesions. Lymph nodes: Cervical, supraclavicular, and axillary nodes normal.   Neurologic: CNII-XII intact. Normal strength, sensation and reflexes throughout. Assessment/Plan:       ICD-10-CM ICD-9-CM    1.  Pure hypercholesterolemia E78.00 272.0 Continue statin   2. Hypothyroidism, unspecified type E03.9 244.9 Continue synthroid   3. Acquired hypothyroidism E03.9 244.9 T4, FREE      TSH 3RD GENERATION   4. Controlled type 2 diabetes mellitus without complication, without long-term current use of insulin (HCC) E11.9 250.00 AMB POC HEMOGLOBIN H0T      METABOLIC PANEL, COMPREHENSIVE          Advised her to call back or return to office if symptoms worsen/change/persist.  Discussed expected course/resolution/complications of diagnosis in detail with patient. Medication risks/benefits/costs/interactions/alternatives discussed with patient. She was given an after visit summary which includes diagnoses, current medications, & vitals. She expressed understanding with the diagnosis and plan.

## 2019-10-12 LAB
ALBUMIN SERPL-MCNC: 4.7 G/DL (ref 3.6–4.8)
ALBUMIN/GLOB SERPL: 1.5 {RATIO} (ref 1.2–2.2)
ALP SERPL-CCNC: 78 IU/L (ref 39–117)
ALT SERPL-CCNC: 28 IU/L (ref 0–32)
AST SERPL-CCNC: 23 IU/L (ref 0–40)
BILIRUB SERPL-MCNC: 0.3 MG/DL (ref 0–1.2)
BUN SERPL-MCNC: 8 MG/DL (ref 8–27)
BUN/CREAT SERPL: 13 (ref 12–28)
CALCIUM SERPL-MCNC: 9.5 MG/DL (ref 8.7–10.3)
CHLORIDE SERPL-SCNC: 99 MMOL/L (ref 96–106)
CO2 SERPL-SCNC: 26 MMOL/L (ref 20–29)
CREAT SERPL-MCNC: 0.64 MG/DL (ref 0.57–1)
GLOBULIN SER CALC-MCNC: 3.2 G/DL (ref 1.5–4.5)
GLUCOSE SERPL-MCNC: 117 MG/DL (ref 65–99)
POTASSIUM SERPL-SCNC: 4 MMOL/L (ref 3.5–5.2)
PROT SERPL-MCNC: 7.9 G/DL (ref 6–8.5)
SODIUM SERPL-SCNC: 141 MMOL/L (ref 134–144)
T4 FREE SERPL-MCNC: 1.39 NG/DL (ref 0.82–1.77)
TSH SERPL DL<=0.005 MIU/L-ACNC: 1.03 UIU/ML (ref 0.45–4.5)

## 2019-12-09 NOTE — TELEPHONE ENCOUNTER
----- Message from Donna Watson LPN sent at [phone]  1:04 PM EST -----  Regarding: FW: Dr. Alan Valencia: [phone]    ----- Message -----  From: Comfort Beam: 11/26/2019  12:49 PM EST  To: [facility] Front Office  Subject: Dr. Govind Harley Refill                       Medication Refill      Best contact number(s): [phone]      Name of medication and dosage if known: Metformin, losartan,\"levoaline\", \"alcastatin\" and two other medications she can't remember the names, lancets and test strips      Is patient out of this medication (yes/no): no      Pharmacy name: [id number] and test strips    Pharmacy listed in chart? (yes/no): Yes  Pharmacy phone number:      Details to clarify the request: 90 day supply due to having to switch mail order pharmacies in January. She stated it would need to be put in around 12/04/2019.       August Stephenson

## 2019-12-17 ENCOUNTER — TELEPHONE (OUTPATIENT)
Dept: INTERNAL MEDICINE CLINIC | Age: 64
End: 2019-12-17

## 2019-12-19 RX ORDER — METFORMIN HYDROCHLORIDE 500 MG/1
TABLET ORAL
Qty: 180 TAB | Refills: 3 | Status: SHIPPED | OUTPATIENT
Start: 2019-12-19 | End: 2020-03-24 | Stop reason: SDUPTHER

## 2019-12-19 RX ORDER — ATORVASTATIN CALCIUM 10 MG/1
TABLET, FILM COATED ORAL
Qty: 90 TAB | Refills: 3 | Status: SHIPPED | OUTPATIENT
Start: 2019-12-19 | End: 2020-03-24 | Stop reason: SDUPTHER

## 2019-12-19 RX ORDER — LANCETS
EACH MISCELLANEOUS
Qty: 3 PACKAGE | Refills: 3 | Status: SHIPPED | OUTPATIENT
Start: 2019-12-19 | End: 2021-08-03 | Stop reason: SDUPTHER

## 2019-12-19 RX ORDER — LOSARTAN POTASSIUM 25 MG/1
TABLET ORAL
Qty: 90 TAB | Refills: 3 | Status: SHIPPED | OUTPATIENT
Start: 2019-12-19 | End: 2020-03-24 | Stop reason: SDUPTHER

## 2019-12-19 RX ORDER — LEVOTHYROXINE SODIUM 25 UG/1
TABLET ORAL
Qty: 90 TAB | Refills: 3 | Status: SHIPPED | OUTPATIENT
Start: 2019-12-19 | End: 2020-03-24 | Stop reason: SDUPTHER

## 2020-03-24 RX ORDER — ATORVASTATIN CALCIUM 10 MG/1
TABLET, FILM COATED ORAL
Qty: 90 TAB | Refills: 3 | Status: SHIPPED | OUTPATIENT
Start: 2020-03-24 | End: 2021-03-24 | Stop reason: SDUPTHER

## 2020-03-24 RX ORDER — LOSARTAN POTASSIUM 25 MG/1
TABLET ORAL
Qty: 90 TAB | Refills: 3 | Status: SHIPPED | OUTPATIENT
Start: 2020-03-24 | End: 2021-03-24 | Stop reason: SDUPTHER

## 2020-03-24 RX ORDER — LEVOTHYROXINE SODIUM 25 UG/1
TABLET ORAL
Qty: 90 TAB | Refills: 3 | Status: SHIPPED | OUTPATIENT
Start: 2020-03-24 | End: 2021-03-24 | Stop reason: SDUPTHER

## 2020-03-24 RX ORDER — METFORMIN HYDROCHLORIDE 500 MG/1
TABLET ORAL
Qty: 180 TAB | Refills: 3 | Status: SHIPPED | OUTPATIENT
Start: 2020-03-24 | End: 2021-03-24 | Stop reason: SDUPTHER

## 2020-06-26 ENCOUNTER — VIRTUAL VISIT (OUTPATIENT)
Dept: INTERNAL MEDICINE CLINIC | Age: 65
End: 2020-06-26

## 2020-06-26 DIAGNOSIS — E11.9 CONTROLLED TYPE 2 DIABETES MELLITUS WITHOUT COMPLICATION, WITHOUT LONG-TERM CURRENT USE OF INSULIN (HCC): Primary | ICD-10-CM

## 2020-06-26 DIAGNOSIS — E03.9 HYPOTHYROIDISM, UNSPECIFIED TYPE: ICD-10-CM

## 2020-06-26 DIAGNOSIS — E78.5 HYPERLIPIDEMIA, UNSPECIFIED HYPERLIPIDEMIA TYPE: ICD-10-CM

## 2020-06-26 NOTE — PROGRESS NOTES
Jen Bass is a 76 y.o. female who was seen by synchronous (real-time) audio-video technology on 6/26/2020. Consent: Jen Bass, who was seen by synchronous (real-time) audio-video technology, and/or her healthcare decision maker, is aware that this patient-initiated, Telehealth encounter on 6/26/2020 is a billable service, with coverage as determined by her insurance carrier. She is aware that she may receive a bill and has provided verbal consent to proceed: Yes. Assessment & Plan:   Diagnoses and all orders for this visit:    1. Controlled type 2 diabetes mellitus without complication, without long-term current use of insulin (HCC)  -     METABOLIC PANEL, COMPREHENSIVE  -     HEMOGLOBIN A1C W/O EAG  -     MICROALBUMIN, UR, RAND W/ MICROALB/CREAT RATIO  Patient will work on adjusting her diet  Decrease carbs  Increase exercise  2. Hyperlipidemia, unspecified hyperlipidemia type  -     LIPID PANEL    3. Hypothyroidism, unspecified type  -     TSH 3RD GENERATION  -     T4, FREE            Subjective:   Jen Bass is a 76 y.o. female who was seen for Diabetes; Cholesterol Problem; and Thyroid Problem      Type 2 diabetes: weight has been stable, she tries to do some walking. Her fasting blood sugar is 132. She is compliant with her metformin    Hypothyroid  Compliant with meds and denies side effects      Hyperlipidemia  Compliant with meds and denies side effects      She takes Vit D and will add Vit C    Patient would like some help gettimg her mammogram films from [facility]    Prior to Admission medications    Medication Sig Start Date End Date Taking?  Authorizing Provider   losartan (COZAAR) 25 mg tablet TAKE 1 TABLET BY MOUTH DAILY 3/24/20  Yes Medford Habermann, MD   atorvastatin (LIPITOR) 10 mg tablet TAKE 1 TABLET BY MOUTH DAILY 3/24/20  Yes Medford Habermann, MD   metFORMIN (GLUCOPHAGE) 500 mg tablet TAKE 1 TABLET BY MOUTH TWICE DAILY WITH MEALS 3/24/20  Yes Joshua Kearns MD TU   levothyroxine (SYNTHROID) 25 mcg tablet TAKE 1 TABLET BY MOUTH DAILY 3/24/20  Yes Medford Habermann, MD   lancets misc Check blood glucose once daily. DX E11.9 12/19/19  Yes Medford Habermann, MD   glucose blood VI test strips (ASCENSIA AUTODISC VI, ONE TOUCH ULTRA TEST VI) strip One touch ultra test strips. Test blood sugars once daily DX E11.9 12/19/19  Yes Medford Habermann, MD   fluticasone [facility] ALLERGY RELIEF) 50 mcg/actuation nasal spray 2 Sprays by Both Nostrils route daily. Yes Provider, Historical   Blood-Glucose Meter monitoring kit One touch ultra meter 1/15/17  Yes Medford Habermann, MD   calcium carbonate (CALTREX) 600 mg (1,500 mg) tablet Take 600 mg by mouth two (2) times a day. Yes Provider, Historical   cholecalciferol, vitamin D3, (VITAMIN D3) 2,000 unit tab Take  by mouth. Yes Provider, Historical     Allergies   Allergen Reactions    Penicillins Unknown (comments)           Review of Systems   Constitutional: Negative for weight loss. Eyes: Negative for blurred vision. Respiratory: Negative for shortness of breath. Cardiovascular: Negative for chest pain and leg swelling. Genitourinary: Negative for frequency and urgency. Musculoskeletal: Negative for joint pain. Neurological: Negative for headaches. Objective:   Vital Signs: (As obtained by patient/caregiver at home)  There were no vitals taken for this visit.      [INSTRUCTIONS:  \"[x]\" Indicates a positive item  \"[]\" Indicates a negative item  -- DELETE ALL ITEMS NOT EXAMINED]    Constitutional: [x] Appears well-developed and well-nourished [x] No apparent distress      [] Abnormal -     Mental status: [x] Alert and awake  [x] Oriented to person/place/time [x] Able to follow commands    [] Abnormal -     Eyes:   EOM    [x]  Normal    [] Abnormal -   Sclera  [x]  Normal    [] Abnormal -          Discharge [x]  None visible   [] Abnormal -     HENT: [x] Normocephalic, atraumatic  [] Abnormal -   [x] Mouth/Throat: Mucous membranes are moist    External Ears [x] Normal  [] Abnormal -    Neck: [x] No visualized mass [] Abnormal -     Pulmonary/Chest: [x] Respiratory effort normal   [x] No visualized signs of difficulty breathing or respiratory distress        [] Abnormal -      Musculoskeletal:   [x] Normal gait with no signs of ataxia         [x] Normal range of motion of neck        [] Abnormal -     Neurological:        [x] No Facial Asymmetry (Cranial nerve 7 motor function) (limited exam due to video visit)          [x] No gaze palsy        [] Abnormal -          Skin:        [x] No significant exanthematous lesions or discoloration noted on facial skin         [] Abnormal -            Psychiatric:       [x] Normal Affect [] Abnormal -        [x] No Hallucinations    Other pertinent observable physical exam findings:-        We discussed the expected course, resolution and complications of the diagnosis(es) in detail. Medication risks, benefits, costs, interactions, and alternatives were discussed as indicated. I advised her to contact the office if her condition worsens, changes or fails to improve as anticipated. She expressed understanding with the diagnosis(es) and plan. Alivia Salinas is a 76 y.o. female who was evaluated by a video visit encounter for concerns as above. Patient identification was verified prior to start of the visit. A caregiver was present when appropriate. Due to this being a TeleHealth encounter (During PSKJR-27 public health emergency), evaluation of the following organ systems was limited: Vitals/Constitutional/EENT/Resp/CV/GI//MS/Neuro/Skin/Heme-Lymph-Imm.   Pursuant to the emergency declaration under the [address] [place] [address] and the [employer] and [employer] Act, this Virtual  Visit was conducted, with patient's (and/or legal guardian's) consent, to reduce the patient's risk of exposure to COVID-19 and provide necessary medical care. Services were provided through a video synchronous discussion virtually to substitute for in-person clinic visit. Patient and provider were located at their individual homes.       Tran Gar MD

## 2020-06-26 NOTE — PROGRESS NOTES
Chief Complaint   Patient presents with    Diabetes    Cholesterol Problem    Thyroid Problem     Pt presents for routine follow up. 1. Have you been to the ER, urgent care clinic since your last visit? Hospitalized since your last visit? No    2. Have you seen or consulted any other health care providers outside of the [address] [place] [address] since your last visit? Include any pap smears or colon screening. Yes, seen by Dermatologist Dr. Ramiro Celestin on 3/19.

## 2020-07-10 ENCOUNTER — CLINICAL SUPPORT (OUTPATIENT)
Dept: INTERNAL MEDICINE CLINIC | Age: 65
End: 2020-07-10

## 2020-07-10 DIAGNOSIS — E78.5 HYPERLIPIDEMIA, UNSPECIFIED HYPERLIPIDEMIA TYPE: ICD-10-CM

## 2020-07-10 DIAGNOSIS — I10 ESSENTIAL HYPERTENSION WITH GOAL BLOOD PRESSURE LESS THAN 140/90: Primary | ICD-10-CM

## 2020-07-10 DIAGNOSIS — E11.9 CONTROLLED TYPE 2 DIABETES MELLITUS WITHOUT COMPLICATION, WITHOUT LONG-TERM CURRENT USE OF INSULIN (HCC): ICD-10-CM

## 2020-07-12 LAB
ALBUMIN SERPL-MCNC: 4.4 G/DL (ref 3.8–4.8)
ALBUMIN/CREAT UR: 14 MG/G CREAT (ref 0–29)
ALBUMIN/GLOB SERPL: 1.5 {RATIO} (ref 1.2–2.2)
ALP SERPL-CCNC: 67 IU/L (ref 39–117)
ALT SERPL-CCNC: 34 IU/L (ref 0–32)
AST SERPL-CCNC: 26 IU/L (ref 0–40)
BILIRUB SERPL-MCNC: 0.3 MG/DL (ref 0–1.2)
BUN SERPL-MCNC: 9 MG/DL (ref 8–27)
BUN/CREAT SERPL: 15 (ref 12–28)
CALCIUM SERPL-MCNC: 9 MG/DL (ref 8.7–10.3)
CHLORIDE SERPL-SCNC: 101 MMOL/L (ref 96–106)
CHOLEST SERPL-MCNC: 121 MG/DL (ref 100–199)
CO2 SERPL-SCNC: 21 MMOL/L (ref 20–29)
CREAT SERPL-MCNC: 0.62 MG/DL (ref 0.57–1)
CREAT UR-MCNC: 125.8 MG/DL
GLOBULIN SER CALC-MCNC: 2.9 G/DL (ref 1.5–4.5)
GLUCOSE SERPL-MCNC: 124 MG/DL (ref 65–99)
HBA1C MFR BLD: 7.3 % (ref 4.8–5.6)
HDLC SERPL-MCNC: 26 MG/DL
LDLC SERPL CALC-MCNC: 73 MG/DL (ref 0–99)
MICROALBUMIN UR-MCNC: 17.7 UG/ML
POTASSIUM SERPL-SCNC: 4.2 MMOL/L (ref 3.5–5.2)
PROT SERPL-MCNC: 7.3 G/DL (ref 6–8.5)
SODIUM SERPL-SCNC: 141 MMOL/L (ref 134–144)
T4 FREE SERPL-MCNC: 1.32 NG/DL (ref 0.82–1.77)
TRIGL SERPL-MCNC: 110 MG/DL (ref 0–149)
TSH SERPL DL<=0.005 MIU/L-ACNC: 0.69 UIU/ML (ref 0.45–4.5)
VLDLC SERPL CALC-MCNC: 22 MG/DL (ref 5–40)

## 2021-02-10 DIAGNOSIS — E03.9 HYPOTHYROIDISM, UNSPECIFIED TYPE: ICD-10-CM

## 2021-02-10 DIAGNOSIS — E11.9 CONTROLLED TYPE 2 DIABETES MELLITUS WITHOUT COMPLICATION, WITHOUT LONG-TERM CURRENT USE OF INSULIN (HCC): ICD-10-CM

## 2021-02-10 DIAGNOSIS — E78.5 HYPERLIPIDEMIA, UNSPECIFIED HYPERLIPIDEMIA TYPE: ICD-10-CM

## 2021-02-10 LAB
ALBUMIN SERPL-MCNC: 4.2 G/DL (ref 3.5–5)
ALBUMIN/GLOB SERPL: 1.2 {RATIO} (ref 1.1–2.2)
ALP SERPL-CCNC: 69 U/L (ref 45–117)
ALT SERPL-CCNC: 52 U/L (ref 12–78)
ANION GAP SERPL CALC-SCNC: 2 MMOL/L (ref 5–15)
AST SERPL-CCNC: 28 U/L (ref 15–37)
BILIRUB SERPL-MCNC: 0.3 MG/DL (ref 0.2–1)
BUN SERPL-MCNC: 12 MG/DL (ref 6–20)
BUN/CREAT SERPL: 18 (ref 12–20)
CALCIUM SERPL-MCNC: 9.4 MG/DL (ref 8.5–10.1)
CHLORIDE SERPL-SCNC: 106 MMOL/L (ref 97–108)
CHOLEST SERPL-MCNC: 118 MG/DL
CO2 SERPL-SCNC: 29 MMOL/L (ref 21–32)
CREAT SERPL-MCNC: 0.68 MG/DL (ref 0.55–1.02)
EST. AVERAGE GLUCOSE BLD GHB EST-MCNC: 157 MG/DL
GLOBULIN SER CALC-MCNC: 3.6 G/DL (ref 2–4)
GLUCOSE SERPL-MCNC: 131 MG/DL (ref 65–100)
HBA1C MFR BLD: 7.1 % (ref 4–5.6)
HDLC SERPL-MCNC: 33 MG/DL
HDLC SERPL: 3.6 {RATIO} (ref 0–5)
LDLC SERPL CALC-MCNC: 64 MG/DL (ref 0–100)
LIPID PROFILE,FLP: NORMAL
POTASSIUM SERPL-SCNC: 4.4 MMOL/L (ref 3.5–5.1)
PROT SERPL-MCNC: 7.8 G/DL (ref 6.4–8.2)
SODIUM SERPL-SCNC: 137 MMOL/L (ref 136–145)
T4 FREE SERPL-MCNC: 1.1 NG/DL (ref 0.8–1.5)
TRIGL SERPL-MCNC: 105 MG/DL (ref ?–150)
TSH SERPL DL<=0.05 MIU/L-ACNC: 0.71 UIU/ML (ref 0.36–3.74)
VLDLC SERPL CALC-MCNC: 21 MG/DL

## 2021-02-26 ENCOUNTER — OFFICE VISIT (OUTPATIENT)
Dept: INTERNAL MEDICINE CLINIC | Age: 66
End: 2021-02-26
Payer: MEDICARE

## 2021-02-26 VITALS
OXYGEN SATURATION: 93 % | HEART RATE: 78 BPM | WEIGHT: 212 LBS | HEIGHT: 63 IN | SYSTOLIC BLOOD PRESSURE: 135 MMHG | TEMPERATURE: 97 F | RESPIRATION RATE: 16 BRPM | DIASTOLIC BLOOD PRESSURE: 82 MMHG | BODY MASS INDEX: 37.56 KG/M2

## 2021-02-26 DIAGNOSIS — E11.9 CONTROLLED TYPE 2 DIABETES MELLITUS WITHOUT COMPLICATION, WITHOUT LONG-TERM CURRENT USE OF INSULIN (HCC): ICD-10-CM

## 2021-02-26 DIAGNOSIS — I10 ESSENTIAL HYPERTENSION WITH GOAL BLOOD PRESSURE LESS THAN 140/90: Primary | ICD-10-CM

## 2021-02-26 DIAGNOSIS — E78.5 HYPERLIPIDEMIA, UNSPECIFIED HYPERLIPIDEMIA TYPE: ICD-10-CM

## 2021-02-26 DIAGNOSIS — E03.9 HYPOTHYROIDISM, UNSPECIFIED TYPE: ICD-10-CM

## 2021-02-26 PROCEDURE — G8432 DEP SCR NOT DOC, RNG: HCPCS | Performed by: INTERNAL MEDICINE

## 2021-02-26 PROCEDURE — G8536 NO DOC ELDER MAL SCRN: HCPCS | Performed by: INTERNAL MEDICINE

## 2021-02-26 PROCEDURE — 1090F PRES/ABSN URINE INCON ASSESS: CPT | Performed by: INTERNAL MEDICINE

## 2021-02-26 PROCEDURE — 1101F PT FALLS ASSESS-DOCD LE1/YR: CPT | Performed by: INTERNAL MEDICINE

## 2021-02-26 PROCEDURE — G8417 CALC BMI ABV UP PARAM F/U: HCPCS | Performed by: INTERNAL MEDICINE

## 2021-02-26 PROCEDURE — 3051F HG A1C>EQUAL 7.0%<8.0%: CPT | Performed by: INTERNAL MEDICINE

## 2021-02-26 PROCEDURE — 99214 OFFICE O/P EST MOD 30 MIN: CPT | Performed by: INTERNAL MEDICINE

## 2021-02-26 PROCEDURE — 2022F DILAT RTA XM EVC RTNOPTHY: CPT | Performed by: INTERNAL MEDICINE

## 2021-02-26 PROCEDURE — G8754 DIAS BP LESS 90: HCPCS | Performed by: INTERNAL MEDICINE

## 2021-02-26 PROCEDURE — G8752 SYS BP LESS 140: HCPCS | Performed by: INTERNAL MEDICINE

## 2021-02-26 PROCEDURE — G8399 PT W/DXA RESULTS DOCUMENT: HCPCS | Performed by: INTERNAL MEDICINE

## 2021-02-26 PROCEDURE — 3017F COLORECTAL CA SCREEN DOC REV: CPT | Performed by: INTERNAL MEDICINE

## 2021-02-26 PROCEDURE — G8427 DOCREV CUR MEDS BY ELIG CLIN: HCPCS | Performed by: INTERNAL MEDICINE

## 2021-02-26 NOTE — PROGRESS NOTES
Chief Complaint   Patient presents with    Diabetes         1. Have you been to the ER, urgent care clinic since your last visit? no  Hospitalized since your last visit? no    2. Have you seen or consulted any other health care providers outside of the [address] [place] [address] since your last visit? Include any pap smears or colon screening.   no

## 2021-02-26 NOTE — PROGRESS NOTES
Subjective:      Cora Barajas is a 76 y.o. female who presents today for   Chief Complaint   Patient presents with    Diabetes   Labs reviewed from 2/10/21   Type 2 diabetes: weight has been stable, she tries to do some walking. Her fasting blood sugar is ranging 150-160. She is compliant with her metformin  She is at weight watchers  A1c has decreased from 7.3 to 7.1  Slight drop from last check   microalbumin normal    Hypothyroid  Compliant with meds and denies side effects      Hyperlipidemia  Compliant with meds and denies side effects      She takes Vit D and will add Vit C     Needs refills today    Flu UTD  TDAP UTD  covid x 2  Pneumovax due       3 months follow up+                  Patient Active Problem List    Diagnosis Date Noted    Severe obesity ([facility] [address] 04/05/2019    Diabetes mellitus type 2, controlled ([facility] [address] 09/09/2016    Encounter for immunization 09/09/2016    Hyperlipidemia 09/09/2016    Essential hypertension with goal blood pressure less than 140/90 09/09/2016    Gastroesophageal reflux disease without esophagitis 09/09/2016    Symptomatic menopausal or female climacteric states 04/05/2012    Acquired absence of both cervix and uterus 04/05/2012     Current Outpatient Medications   Medication Sig Dispense Refill    losartan (COZAAR) 25 mg tablet TAKE 1 TABLET BY MOUTH DAILY 90 Tab 3    atorvastatin (LIPITOR) 10 mg tablet TAKE 1 TABLET BY MOUTH DAILY 90 Tab 3    metFORMIN (GLUCOPHAGE) 500 mg tablet TAKE 1 TABLET BY MOUTH TWICE DAILY WITH MEALS 180 Tab 3    levothyroxine (SYNTHROID) 25 mcg tablet TAKE 1 TABLET BY MOUTH DAILY 90 Tab 3    lancets misc Check blood glucose once daily. DX E11.9 3 Package 3    glucose blood VI test strips (ASCENSIA AUTODISC VI, ONE TOUCH ULTRA TEST VI) strip One touch ultra test strips.  Test blood sugars once daily DX E11.9 100 Strip 3    Blood-Glucose Meter monitoring kit One touch ultra meter 1 Kit 0    calcium carbonate (CALTREX) 600 mg (1,500 mg) tablet Take 600 mg by mouth two (2) times a day.  cholecalciferol, vitamin D3, (VITAMIN D3) 2,000 unit tab Take  by mouth.  fluticasone (FLONASE ALLERGY RELIEF) 50 mcg/actuation nasal spray 2 Sprays by Both Nostrils route daily. Allergies   Allergen Reactions    Penicillins Unknown (comments)     Past Medical History:   Diagnosis Date    Acquired absence of both cervix and uterus 4/5/2012    Diabetes ([address]     Hx of bone density study 11/25/2015    Osteopenia    Hx of mammogram 2/25/13    Negative    Hypercholesterolemia     Osteopenia 2015,11/11    Mild    Pap smear for cervical cancer screening 5/8/06    Normal Pap    Symptomatic menopausal or female climacteric states 4/5/2012     Past Surgical History:   Procedure Laterality Date    HX BREAST BIOPSY      HX TOTAL ABDOMINAL HYSTERECTOMY  10/2003     Family History   Problem Relation Age of Onset    Diabetes Sister     Cancer Father         Lung/Prostate    MS Mother      Social History     Tobacco Use    Smoking status: Never Smoker    Smokeless tobacco: Never Used   Substance Use Topics    Alcohol use: Yes     Alcohol/week: 3.0 standard drinks     Types: 3 Glasses of wine per week     Frequency: 2-3 times a week     Comment: weekly        Review of Systems    A comprehensive review of systems was negative except for that written in the HPI. Objective:     Visit Vitals  /82   Pulse 78   Temp 97 °F (36.1 °C) (Temporal)   Resp 16   Ht 5' 3\" (1.6 m)   Wt 212 lb (96.2 kg)   SpO2 93%   BMI 37.55 kg/m²     General:  Alert, cooperative, no distress, appears stated age. Head:  Normocephalic, without obvious abnormality, atraumatic. Eyes:  Conjunctivae/corneas clear. PERRL, EOMs intact. Fundi benign. Ears:  Normal TMs and external ear canals both ears. Neck: Supple, symmetrical, trachea midline, no adenopathy, thyroid: no enlargement/tenderness/nodules, no carotid bruit and no JVD.    Back:   Symmetric, no curvature. ROM normal. No CVA tenderness. Lungs:   Clear to auscultation bilaterally. Chest wall:  No tenderness or deformity. Heart:  Regular rate and rhythm, S1, S2 normal, no murmur, click, rub or gallop. Abdomen:   Soft, non-tender. Bowel sounds normal. No masses,  No organomegaly. Extremities: Extremities normal, atraumatic, no cyanosis or edema. Pulses: 2+ and symmetric all extremities. Skin: Skin color, texture, turgor normal. No rashes or lesions. Lymph nodes: Cervical, supraclavicular, and axillary nodes normal.   Neurologic: CNII-XII intact. Normal strength, sensation and reflexes throughout. Assessment/Plan:       ICD-10-CM ICD-9-CM    1. Essential hypertension with goal blood pressure less than 140/90  I10 401.9 Continue current therapy   2. Controlled type 2 diabetes mellitus without complication, without long-term current use of insulin (HCC)  E11.9 250.00 Continue current management    Work on diet, reduce sugar and carbs   3. Hyperlipidemia, unspecified hyperlipidemia type  E78.5 272.4 Continue current management   4. Hypothyroidism, unspecified type  E03.9 244.9 Continue current therapy          Advised her to call back or return to office if symptoms worsen/change/persist.  Discussed expected course/resolution/complications of diagnosis in detail with patient. Medication risks/benefits/costs/interactions/alternatives discussed with patient. She was given an after visit summary which includes diagnoses, current medications, & vitals. She expressed understanding with the diagnosis and plan.

## 2021-03-28 RX ORDER — LOSARTAN POTASSIUM 25 MG/1
TABLET ORAL
Qty: 90 TAB | Refills: 3 | Status: SHIPPED | OUTPATIENT
Start: 2021-03-28 | End: 2021-12-16 | Stop reason: SDUPTHER

## 2021-03-28 RX ORDER — METFORMIN HYDROCHLORIDE 500 MG/1
TABLET ORAL
Qty: 180 TAB | Refills: 3 | Status: SHIPPED | OUTPATIENT
Start: 2021-03-28 | End: 2021-09-02 | Stop reason: SDUPTHER

## 2021-03-28 RX ORDER — ATORVASTATIN CALCIUM 10 MG/1
TABLET, FILM COATED ORAL
Qty: 90 TAB | Refills: 3 | Status: SHIPPED | OUTPATIENT
Start: 2021-03-28 | End: 2021-12-16 | Stop reason: SDUPTHER

## 2021-03-28 RX ORDER — LEVOTHYROXINE SODIUM 25 UG/1
TABLET ORAL
Qty: 90 TAB | Refills: 3 | Status: SHIPPED | OUTPATIENT
Start: 2021-03-28 | End: 2021-12-16 | Stop reason: SDUPTHER

## 2021-08-05 RX ORDER — INSULIN PUMP SYRINGE, 3 ML
EACH MISCELLANEOUS
Qty: 1 KIT | Refills: 0 | Status: SHIPPED | OUTPATIENT
Start: 2021-08-05 | End: 2021-08-19 | Stop reason: SDUPTHER

## 2021-08-05 RX ORDER — LANCETS
EACH MISCELLANEOUS
Qty: 100 EACH | Refills: 3 | Status: SHIPPED | OUTPATIENT
Start: 2021-08-05 | End: 2021-08-19 | Stop reason: SDUPTHER

## 2021-08-24 RX ORDER — LANCETS
EACH MISCELLANEOUS
Qty: 100 EACH | Refills: 3 | Status: SHIPPED | OUTPATIENT
Start: 2021-08-24

## 2021-08-24 RX ORDER — INSULIN PUMP SYRINGE, 3 ML
EACH MISCELLANEOUS
Qty: 1 KIT | Refills: 0 | Status: SHIPPED | OUTPATIENT
Start: 2021-08-24

## 2021-09-04 RX ORDER — METFORMIN HYDROCHLORIDE 500 MG/1
TABLET ORAL
Qty: 180 TABLET | Refills: 3 | Status: SHIPPED | OUTPATIENT
Start: 2021-09-04 | End: 2021-09-08 | Stop reason: SDUPTHER

## 2021-09-08 RX ORDER — METFORMIN HYDROCHLORIDE 500 MG/1
TABLET ORAL
Qty: 180 TABLET | Refills: 3 | Status: SHIPPED | OUTPATIENT
Start: 2021-09-08

## 2021-09-22 ENCOUNTER — OFFICE VISIT (OUTPATIENT)
Dept: INTERNAL MEDICINE CLINIC | Age: 66
End: 2021-09-22
Payer: MEDICARE

## 2021-09-22 VITALS
HEART RATE: 80 BPM | HEIGHT: 63 IN | TEMPERATURE: 97.8 F | BODY MASS INDEX: 36.48 KG/M2 | DIASTOLIC BLOOD PRESSURE: 80 MMHG | SYSTOLIC BLOOD PRESSURE: 129 MMHG | RESPIRATION RATE: 18 BRPM | WEIGHT: 205.9 LBS | OXYGEN SATURATION: 97 %

## 2021-09-22 DIAGNOSIS — Z78.0 POSTMENOPAUSAL: ICD-10-CM

## 2021-09-22 DIAGNOSIS — L02.92 BOILS OF MULTIPLE SITES: ICD-10-CM

## 2021-09-22 DIAGNOSIS — Z00.00 MEDICARE ANNUAL WELLNESS VISIT, SUBSEQUENT: Primary | ICD-10-CM

## 2021-09-22 DIAGNOSIS — Z12.31 ENCOUNTER FOR SCREENING MAMMOGRAM FOR MALIGNANT NEOPLASM OF BREAST: ICD-10-CM

## 2021-09-22 DIAGNOSIS — Z23 ENCOUNTER FOR IMMUNIZATION: ICD-10-CM

## 2021-09-22 DIAGNOSIS — M85.80 OSTEOPENIA, UNSPECIFIED LOCATION: ICD-10-CM

## 2021-09-22 PROCEDURE — G8754 DIAS BP LESS 90: HCPCS | Performed by: INTERNAL MEDICINE

## 2021-09-22 PROCEDURE — G8536 NO DOC ELDER MAL SCRN: HCPCS | Performed by: INTERNAL MEDICINE

## 2021-09-22 PROCEDURE — G8399 PT W/DXA RESULTS DOCUMENT: HCPCS | Performed by: INTERNAL MEDICINE

## 2021-09-22 PROCEDURE — G0439 PPPS, SUBSEQ VISIT: HCPCS | Performed by: INTERNAL MEDICINE

## 2021-09-22 PROCEDURE — G9899 SCRN MAM PERF RSLTS DOC: HCPCS | Performed by: INTERNAL MEDICINE

## 2021-09-22 PROCEDURE — G8428 CUR MEDS NOT DOCUMENT: HCPCS | Performed by: INTERNAL MEDICINE

## 2021-09-22 PROCEDURE — 1101F PT FALLS ASSESS-DOCD LE1/YR: CPT | Performed by: INTERNAL MEDICINE

## 2021-09-22 PROCEDURE — G8752 SYS BP LESS 140: HCPCS | Performed by: INTERNAL MEDICINE

## 2021-09-22 PROCEDURE — G8417 CALC BMI ABV UP PARAM F/U: HCPCS | Performed by: INTERNAL MEDICINE

## 2021-09-22 PROCEDURE — 3017F COLORECTAL CA SCREEN DOC REV: CPT | Performed by: INTERNAL MEDICINE

## 2021-09-22 PROCEDURE — G8510 SCR DEP NEG, NO PLAN REQD: HCPCS | Performed by: INTERNAL MEDICINE

## 2021-09-22 RX ORDER — SULFAMETHOXAZOLE AND TRIMETHOPRIM 800; 160 MG/1; MG/1
1 TABLET ORAL 2 TIMES DAILY
Qty: 20 TABLET | Refills: 0 | Status: SHIPPED | OUTPATIENT
Start: 2021-09-22 | End: 2021-09-30 | Stop reason: CLARIF

## 2021-09-22 RX ORDER — DOXYCYCLINE 100 MG/1
100 TABLET ORAL 2 TIMES DAILY
Qty: 20 TABLET | Refills: 0 | Status: SHIPPED | OUTPATIENT
Start: 2021-09-22 | End: 2021-10-02

## 2021-09-22 NOTE — PROGRESS NOTES
Chief Complaint   Patient presents with    Diabetes     follow up    Annual Wellness Visit    Abscess     bump on left side of buttox     1. Have you been to the ER, urgent care clinic since your last visit? Hospitalized since your last visit? No    2. Have you seen or consulted any other health care providers outside of the [address] [place] [address] since your last visit? Include any pap smears or colon screening. No  Visit Vitals  /80   Pulse 80   Temp 97.8 °F (36.6 °C) (Oral)   Resp 18   Ht 5' 3\" (1.6 m)   Wt 205 lb 14.4 oz (93.4 kg)   SpO2 97%   BMI 36.47 kg/m²       This is the Subsequent Medicare Annual Wellness Exam, performed 12 months or more after the Initial AWV or the last Subsequent AWV    I have reviewed the patient's medical history in detail and updated the computerized patient record. Assessment/Plan   Education and counseling provided:  Are appropriate based on today's review and evaluation    1. Medicare annual wellness visit, subsequent       Depression Risk Factor Screening     3 most recent PHQ Screens 6/26/2020   Little interest or pleasure in doing things Not at all   Feeling down, depressed, irritable, or hopeless Not at all   Total Score PHQ 2 0       Alcohol Risk Screen    Do you average more than 1 drink per night or more than 7 drinks a week:  No    On any one occasion in the past three months have you have had more than 3 drinks containing alcohol:  No        Functional Ability and Level of Safety    Hearing: Hearing is good. Activities of Daily Living: The home contains: no safety equipment. Patient does total self care      Ambulation: with no difficulty     Fall Risk:  Fall Risk Assessment, last 12 mths 6/26/2020   Able to walk? Yes   Fall in past 12 months?  No      Abuse Screen:  Patient is not abused       Cognitive Screening    Has your family/caregiver stated any concerns about your memory: no     Cognitive Screening: Normal - MMSE (Mini Mental Status Exam)    Health Maintenance Due     Health Maintenance Due   Topic Date Due    Foot Exam Q1  Never done    COVID-19 Vaccine (1) Never done    Shingrix Vaccine Age 50> (1 of 2) Never done    Eye Exam Retinal or Dilated  06/14/2020    Pneumococcal 65+ years (1 of 1 - PPSV23) 07/15/2020    Breast Cancer Screen Mammogram  05/28/2021    Flu Vaccine (1) 09/01/2021       Patient Care Team   Patient Care Team:  Ria Dexter MD as PCP - General (Internal Medicine)  Ria Dexter MD as PCP - [facility] Empaneled Provider    History     Patient Active Problem List   Diagnosis Code    Symptomatic menopausal or female climacteric states N95.1    Acquired absence of both cervix and uterus Z90.710    Diabetes mellitus type 2, controlled ([address] E11.9    Encounter for immunization Z23    Hyperlipidemia E78.5    Essential hypertension with goal blood pressure less than 140/90 I10    Gastroesophageal reflux disease without esophagitis K21.9    Severe obesity ([address] E66.01     Past Medical History:   Diagnosis Date    Acquired absence of both cervix and uterus 4/5/2012    Diabetes ([address]     Hx of bone density study 11/25/2015    Osteopenia    Hx of mammogram 2/25/13    Negative    Hypercholesterolemia     Osteopenia 2015,11/11    Mild    Pap smear for cervical cancer screening 5/8/06    Normal Pap    Symptomatic menopausal or female climacteric states 4/5/2012      Past Surgical History:   Procedure Laterality Date    HX BREAST BIOPSY      HX TOTAL ABDOMINAL HYSTERECTOMY  10/2003     Current Outpatient Medications   Medication Sig Dispense Refill    metFORMIN (GLUCOPHAGE) 500 mg tablet TAKE 1 TABLET BY MOUTH TWICE DAILY WITH MEALS 180 Tablet 3    Blood-Glucose Meter monitoring kit One touch ultra meter check blood glucose twice daily 1 Kit 0    glucose blood VI test strips (ASCENSIA AUTODISC VI, ONE TOUCH ULTRA TEST VI) strip One touch ultra test strips.  Test blood sugars once daily DX E11.9 100 Strip 3    lancets misc Check blood glucose once daily. DX E11.9 100 Each 3    atorvastatin (LIPITOR) 10 mg tablet TAKE 1 TABLET BY MOUTH DAILY 90 Tab 3    levothyroxine (SYNTHROID) 25 mcg tablet TAKE 1 TABLET BY MOUTH DAILY 90 Tab 3    losartan (COZAAR) 25 mg tablet TAKE 1 TABLET BY MOUTH DAILY 90 Tab 3    fluticasone (FLONASE ALLERGY RELIEF) 50 mcg/actuation nasal spray 2 Sprays by Both Nostrils route daily.  calcium carbonate (CALTREX) 600 mg (1,500 mg) tablet Take 600 mg by mouth two (2) times a day.  cholecalciferol, vitamin D3, (VITAMIN D3) 2,000 unit tab Take  by mouth. Allergies   Allergen Reactions    Penicillins Unknown (comments)       Family History   Problem Relation Age of Onset    Diabetes Sister     Cancer Father         Lung/Prostate    MS Mother      Social History     Tobacco Use    Smoking status: Never Smoker    Smokeless tobacco: Never Used   Substance Use Topics    Alcohol use:  Yes     Alcohol/week: 3.0 standard drinks     Types: 3 Glasses of wine per week     Comment: weekly         Caryn Pitcher

## 2021-09-22 NOTE — PROGRESS NOTES
Subjective:      Aleshia Khalil is a 78 y.o. female who presents today for   Chief Complaint   Patient presents with    Diabetes     follow up    Annual Wellness Visit    Abscess     bump on left side of buttox     Patient in today for follow up. She has lost 10 lbs on Noom    All labs reviewed, improvement in A1c, and HDL. Also blood pressure is lower today. Medicare Wellness today    Pap screen will schedule   Mammogram will schedule  Colonoscopy  DEXA- hx of osteopenia, takes calcium and Vit D    Flu next week at work  covid moderna x 2 UTD  TDAP 2016  Pneumovax 23 today  shingrix UTD    Getting boils under arms and buttock left      Patient Active Problem List    Diagnosis Date Noted    Severe obesity ([facility] 04/05/2019    Diabetes mellitus type 2, controlled ([facility] 09/09/2016    Encounter for immunization 09/09/2016    Hyperlipidemia 09/09/2016    Essential hypertension with goal blood pressure less than 140/90 09/09/2016    Gastroesophageal reflux disease without esophagitis 09/09/2016    Symptomatic menopausal or female climacteric states 04/05/2012    Acquired absence of both cervix and uterus 04/05/2012     Current Outpatient Medications   Medication Sig Dispense Refill    metFORMIN (GLUCOPHAGE) 500 mg tablet TAKE 1 TABLET BY MOUTH TWICE DAILY WITH MEALS 180 Tablet 3    Blood-Glucose Meter monitoring kit One touch ultra meter check blood glucose twice daily 1 Kit 0    glucose blood VI test strips (ASCENSIA AUTODISC VI, ONE TOUCH ULTRA TEST VI) strip One touch ultra test strips. Test blood sugars once daily DX E11.9 100 Strip 3    lancets misc Check blood glucose once daily.  DX E11.9 100 Each 3    atorvastatin (LIPITOR) 10 mg tablet TAKE 1 TABLET BY MOUTH DAILY 90 Tab 3    levothyroxine (SYNTHROID) 25 mcg tablet TAKE 1 TABLET BY MOUTH DAILY 90 Tab 3    losartan (COZAAR) 25 mg tablet TAKE 1 TABLET BY MOUTH DAILY 90 Tab 3    fluticasone (FLONASE ALLERGY RELIEF) 50 mcg/actuation nasal spray 2 Sprays by Both Nostrils route daily.  calcium carbonate (CALTREX) 600 mg (1,500 mg) tablet Take 600 mg by mouth two (2) times a day.  cholecalciferol, vitamin D3, (VITAMIN D3) 2,000 unit tab Take  by mouth. Allergies   Allergen Reactions    Penicillins Unknown (comments)     Past Medical History:   Diagnosis Date    Acquired absence of both cervix and uterus 4/5/2012    Diabetes ([address]     Hx of bone density study 11/25/2015    Osteopenia    Hx of mammogram 2/25/13    Negative    Hypercholesterolemia     Osteopenia 2015,11/11    Mild    Pap smear for cervical cancer screening 5/8/06    Normal Pap    Symptomatic menopausal or female climacteric states 4/5/2012     Past Surgical History:   Procedure Laterality Date    HX BREAST BIOPSY      HX TOTAL ABDOMINAL HYSTERECTOMY  10/2003     Family History   Problem Relation Age of Onset    Diabetes Sister     Cancer Father         Lung/Prostate    MS Mother      Social History     Tobacco Use    Smoking status: Never Smoker    Smokeless tobacco: Never Used   Substance Use Topics    Alcohol use: Yes     Alcohol/week: 3.0 standard drinks     Types: 3 Glasses of wine per week     Comment: weekly        Review of Systems    A comprehensive review of systems was negative except for that written in the HPI. Objective:     Visit Vitals  /80   Pulse 80   Temp 97.8 °F (36.6 °C) (Oral)   Resp 18   Ht 5' 3\" (1.6 m)   Wt 205 lb 14.4 oz (93.4 kg)   SpO2 97%   BMI 36.47 kg/m²     General:  Alert, cooperative, no distress, appears stated age. Head:  Normocephalic, without obvious abnormality, atraumatic. Eyes:  Conjunctivae/corneas clear. PERRL, EOMs intact. Fundi benign. Ears:  Normal TMs and external ear canals both ears. Nose: Nares normal. Septum midline. Mucosa normal. No drainage or sinus tenderness.    Throat: Lips, mucosa, and tongue normal. Teeth and gums normal.   Neck: Supple, symmetrical, trachea midline, no adenopathy, thyroid: no enlargement/tenderness/nodules, no carotid bruit and no JVD. Back:   Symmetric, no curvature. ROM normal. No CVA tenderness. Lungs:   Clear to auscultation bilaterally. Chest wall:  No tenderness or deformity. Heart:  Regular rate and rhythm, S1, S2 normal, no murmur, click, rub or gallop. Abdomen:   Soft, non-tender. Bowel sounds normal. No masses,  No organomegaly. Extremities: Extremities normal, atraumatic, no cyanosis or edema. Pulses: 2+ and symmetric all extremities. Skin: Skin color, texture, turgor normal. No rashes or lesions. Lymph nodes: Cervical, supraclavicular, and axillary nodes normal.   Neurologic: CNII-XII intact. Normal strength, sensation and reflexes throughout. Assessment/Plan:       ICD-10-CM ICD-9-CM    1. Medicare annual wellness visit, subsequent  Z00.00 V70.0    2. Encounter for screening mammogram for malignant neoplasm of breast  Z12.31 V76.12 [facility] MAMMO BI SCREENING INCL CAD   3. Osteopenia, unspecified location  M85.80 733.90 DEXA BONE DENSITY STUDY AXIAL   4. Postmenopausal  Z78.0 V49.81 DEXA BONE DENSITY STUDY AXIAL   5. Encounter for immunization  Z23 V03.89 PNEUMOCOCCAL POLYSACCHARIDE VACCINE, 23-VALENT, ADULT OR IMMUNOSUPPRESSED PT DOSE,   6. Boils of multiple sites  L02.92 680.9 Course of doxycycline  Apply heat to affected areas          Advised her to call back or return to office if symptoms worsen/change/persist.  Discussed expected course/resolution/complications of diagnosis in detail with patient. Medication risks/benefits/costs/interactions/alternatives discussed with patient. She was given an after visit summary which includes diagnoses, current medications, & vitals. She expressed understanding with the diagnosis and plan.

## 2021-09-22 NOTE — PATIENT INSTRUCTIONS
Medicare Wellness Visit, Female     The best way to live healthy is to have a lifestyle where you eat a well-balanced diet, exercise regularly, limit alcohol use, and quit all forms of tobacco/nicotine, if applicable. Regular preventive services are another way to keep healthy. Preventive services (vaccines, screening tests, monitoring & exams) can help personalize your care plan, which helps you manage your own care. Screening tests can find health problems at the earliest stages, when they are easiest to treat. [facility] follows the current, evidence-based guidelines published by the [facility] Kevin Santos ([facility]) when recommending preventive services for our patients. Because we follow these guidelines, sometimes recommendations change over time as research supports it. (For example, mammograms used to be recommended annually. Even though Medicare will still pay for an annual mammogram, the newer guidelines recommend a mammogram every two years for women of average risk). Of course, you and your doctor may decide to screen more often for some diseases, based on your risk and your co-morbidities (chronic disease you are already diagnosed with). Preventive services for you include:  - Medicare offers their members a free annual wellness visit, which is time for you and your primary care provider to discuss and plan for your preventive service needs. Take advantage of this benefit every year!  -All adults over the age of 72 should receive the recommended pneumonia vaccines. Current USPSTF guidelines recommend a series of two vaccines for the best pneumonia protection.   -All adults should have a flu vaccine yearly and a tetanus vaccine every 10 years.   -All adults age 48 and older should receive the shingles vaccines (series of two vaccines).       -All adults age 38-68 who are overweight should have a diabetes screening test once every three years.   -All adults born between [birth year] and 1965 should be screened once for Hepatitis C.  -Other screening tests and preventive services for persons with diabetes include: an eye exam to screen for diabetic retinopathy, a kidney function test, a foot exam, and stricter control over your cholesterol.   -Cardiovascular screening for adults with routine risk involves an electrocardiogram (ECG) at intervals determined by your doctor.   -Colorectal cancer screenings should be done for adults age 54-65 with no increased risk factors for colorectal cancer. There are a number of acceptable methods of screening for this type of cancer. Each test has its own benefits and drawbacks. Discuss with your doctor what is most appropriate for you during your annual wellness visit. The different tests include: colonoscopy (considered the best screening method), a fecal occult blood test, a fecal DNA test, and sigmoidoscopy.    -A bone mass density test is recommended when a woman turns 65 to screen for osteoporosis. This test is only recommended one time, as a screening. Some providers will use this same test as a disease monitoring tool if you already have osteoporosis. -Breast cancer screenings are recommended every other year for women of normal risk, age 54-69.  -Cervical cancer screenings for women over age 72 are only recommended with certain risk factors.      Here is a list of your current Health Maintenance items (your personalized list of preventive services) with a due date:  Health Maintenance Due   Topic Date Due    Diabetic Foot Care  Never done    COVID-19 Vaccine (1) Never done    Shingles Vaccine (1 of 2) Never done    Eye Exam  06/14/2020    Pneumococcal Vaccine (1 of 1 - PPSV23) 07/15/2020    Mammogram  05/28/2021    Yearly Flu Vaccine (1) 09/01/2021

## 2021-10-01 ENCOUNTER — HOSPITAL ENCOUNTER (OUTPATIENT)
Dept: BONE DENSITY | Age: 66
Discharge: HOME | End: 2021-10-01
Attending: INTERNAL MEDICINE
Payer: MEDICARE

## 2021-10-01 ENCOUNTER — TRANSCRIBE ORDER (OUTPATIENT)
Dept: GENERAL RADIOLOGY | Age: 66
End: 2021-10-01

## 2021-10-01 ENCOUNTER — HOSPITAL ENCOUNTER (OUTPATIENT)
Dept: MAMMOGRAPHY | Age: 66
Discharge: HOME | End: 2021-10-01
Attending: INTERNAL MEDICINE
Payer: MEDICARE

## 2021-10-01 DIAGNOSIS — Z78.0 POSTMENOPAUSAL: ICD-10-CM

## 2021-10-01 DIAGNOSIS — Z12.31 VISIT FOR SCREENING MAMMOGRAM: ICD-10-CM

## 2021-10-01 DIAGNOSIS — M85.80 OSTEOPENIA, UNSPECIFIED LOCATION: ICD-10-CM

## 2021-10-01 DIAGNOSIS — Z12.31 VISIT FOR SCREENING MAMMOGRAM: Primary | ICD-10-CM

## 2021-10-01 PROCEDURE — 77080 DXA BONE DENSITY AXIAL: CPT

## 2021-10-01 PROCEDURE — 77063 BREAST TOMOSYNTHESIS BI: CPT

## 2021-10-05 NOTE — PROGRESS NOTES
I would like her to do a virtual phone visit.  She may need medication for her bone health after reviewing her DEXA scan

## 2021-12-14 NOTE — TELEPHONE ENCOUNTER
[facility] CLINICAL PHARMACY: STATIN THERAPY REVIEW  Identified statin use in persons with diabetes care gap per [facility] Records dated: 11/14/21. ASSESSMENT  STATIN GAP IDENTIFIED    Lab Results   Component Value Date/Time    Cholesterol, total 114 09/16/2021 09:03 AM    HDL Cholesterol 36 09/16/2021 09:03 AM    LDL, calculated 57.8 09/16/2021 09:03 AM    VLDL, calculated 20.2 09/16/2021 09:03 AM    Triglyceride 101 09/16/2021 09:03 AM    CHOL/HDL Ratio 3.2 09/16/2021 09:03 AM     ALT (SGPT)   Date Value Ref Range Status   09/16/2021 39 12 - 78 U/L Final     AST (SGOT)   Date Value Ref Range Status   09/16/2021 18 15 - 37 U/L Final     The ASCVD Risk score (Wali Zaragoza, et al., 2013) failed to calculate for the following reasons: The valid total cholesterol range is 130 to 320 mg/dL     Hyperlipidemia Goal: appears is prescribed atorvastatin 10mg daily (last rx'd 3/28/21, #90, 3 refills, to [facility]) - appears is newer to [facility]. · Metformin reordered 9/8 to [address] (appears [facility] supplies rx'd to [facility]) - may also need, atorvastatin, levothyroxine and losartan rxs changed from [facility] to [facility]? PLAN  The following are interventions that have been identified:  - Patient identified as having SUPD gap - rx'd atorvastatin - insurance changed, may just need reordered to [address]? (along with losartan & levothyroxine?)    Attempting to reach patient to review.  Left message asking for return call. and MyChart message sent to patient. No future appointments.     Veena Travis, PharmD, [address] [facility], toll free: [phone], option 2

## 2021-12-16 RX ORDER — ATORVASTATIN CALCIUM 10 MG/1
10 TABLET, FILM COATED ORAL DAILY
Qty: 90 TABLET | Refills: 3 | Status: SHIPPED | OUTPATIENT
Start: 2021-12-16

## 2021-12-16 RX ORDER — LEVOTHYROXINE SODIUM 25 UG/1
25 TABLET ORAL
Qty: 90 TABLET | Refills: 3 | Status: SHIPPED | OUTPATIENT
Start: 2021-12-16

## 2021-12-16 RX ORDER — LOSARTAN POTASSIUM 25 MG/1
25 TABLET ORAL DAILY
Qty: 90 TABLET | Refills: 3 | Status: SHIPPED | OUTPATIENT
Start: 2021-12-16

## 2021-12-16 NOTE — TELEPHONE ENCOUNTER
Courtney Mcfadden MD, patient has changed insurance and mail order pharmacies - can we reorder to [facility] mail?  Rx pended for your signature/modification as appropriate    LOV: 9/22/21  Next: to be scheduled    Thank you,  Sonny Nolen, PharmD, [address] [facility], toll free: [phone], option 2

## 2021-12-16 NOTE — TELEPHONE ENCOUNTER
Reached patient to review. States she did switch to [employer] and her last atorvastatin bottle is still from [facility], she cannot find the date on it, but states she will need to [employer]. Reviewed that it appears metformin has been reordered to [facility] [employer], but not levothyroxine, losartan or atorvastatin - patient asks for those 3 rxs to [facility].

## 2021-12-20 NOTE — TELEPHONE ENCOUNTER
Noted atorvastatin, losartan and levothyroxine reordered to [address], thank you!     Spoke to [facility] - confirm all 3 rxs were received, and were shipped on .    =========================================================   For Pharmacy [address] in place: No   Recommendation Provided To: Provider: 3 via Note to Provider  and Patient/Caregiver: 1 via Telephone   Intervention Detail: Adherence Monitorin and Refill(s) Provided   Gap Closed?: Yes   Intervention Accepted By: Provider: 3 and Patient/Caregiver: 1   Time Spent (min): 20

## 2022-03-20 PROBLEM — E66.01 SEVERE OBESITY (HCC): Status: ACTIVE | Noted: 2019-04-05

## 2022-05-18 ENCOUNTER — OFFICE VISIT (OUTPATIENT)
Dept: INTERNAL MEDICINE CLINIC | Age: 67
End: 2022-05-18
Payer: MEDICARE

## 2022-05-18 VITALS
BODY MASS INDEX: 37.92 KG/M2 | HEIGHT: 63 IN | HEART RATE: 81 BPM | DIASTOLIC BLOOD PRESSURE: 77 MMHG | OXYGEN SATURATION: 97 % | SYSTOLIC BLOOD PRESSURE: 132 MMHG | WEIGHT: 214 LBS | TEMPERATURE: 98 F | RESPIRATION RATE: 18 BRPM

## 2022-05-18 DIAGNOSIS — I10 ESSENTIAL HYPERTENSION WITH GOAL BLOOD PRESSURE LESS THAN 140/90: Primary | ICD-10-CM

## 2022-05-18 DIAGNOSIS — E66.01 SEVERE OBESITY (BMI 35.0-39.9) WITH COMORBIDITY (HCC): ICD-10-CM

## 2022-05-18 DIAGNOSIS — E11.9 CONTROLLED TYPE 2 DIABETES MELLITUS WITHOUT COMPLICATION, WITHOUT LONG-TERM CURRENT USE OF INSULIN (HCC): ICD-10-CM

## 2022-05-18 DIAGNOSIS — E78.5 HYPERLIPIDEMIA, UNSPECIFIED HYPERLIPIDEMIA TYPE: ICD-10-CM

## 2022-05-18 PROCEDURE — G8752 SYS BP LESS 140: HCPCS | Performed by: INTERNAL MEDICINE

## 2022-05-18 PROCEDURE — 99214 OFFICE O/P EST MOD 30 MIN: CPT | Performed by: INTERNAL MEDICINE

## 2022-05-18 PROCEDURE — 3051F HG A1C>EQUAL 7.0%<8.0%: CPT | Performed by: INTERNAL MEDICINE

## 2022-05-18 PROCEDURE — G9899 SCRN MAM PERF RSLTS DOC: HCPCS | Performed by: INTERNAL MEDICINE

## 2022-05-18 PROCEDURE — G8399 PT W/DXA RESULTS DOCUMENT: HCPCS | Performed by: INTERNAL MEDICINE

## 2022-05-18 PROCEDURE — 3017F COLORECTAL CA SCREEN DOC REV: CPT | Performed by: INTERNAL MEDICINE

## 2022-05-18 PROCEDURE — G8754 DIAS BP LESS 90: HCPCS | Performed by: INTERNAL MEDICINE

## 2022-05-18 PROCEDURE — 1123F ACP DISCUSS/DSCN MKR DOCD: CPT | Performed by: INTERNAL MEDICINE

## 2022-05-18 PROCEDURE — G8510 SCR DEP NEG, NO PLAN REQD: HCPCS | Performed by: INTERNAL MEDICINE

## 2022-05-18 PROCEDURE — 1101F PT FALLS ASSESS-DOCD LE1/YR: CPT | Performed by: INTERNAL MEDICINE

## 2022-05-18 PROCEDURE — 1090F PRES/ABSN URINE INCON ASSESS: CPT | Performed by: INTERNAL MEDICINE

## 2022-05-18 PROCEDURE — G8536 NO DOC ELDER MAL SCRN: HCPCS | Performed by: INTERNAL MEDICINE

## 2022-05-18 PROCEDURE — 2022F DILAT RTA XM EVC RTNOPTHY: CPT | Performed by: INTERNAL MEDICINE

## 2022-05-18 PROCEDURE — G8427 DOCREV CUR MEDS BY ELIG CLIN: HCPCS | Performed by: INTERNAL MEDICINE

## 2022-05-18 PROCEDURE — G8417 CALC BMI ABV UP PARAM F/U: HCPCS | Performed by: INTERNAL MEDICINE

## 2022-05-18 RX ORDER — LOSARTAN POTASSIUM 25 MG/1
25 TABLET ORAL DAILY
Qty: 90 TABLET | Refills: 3 | Status: CANCELLED | OUTPATIENT
Start: 2022-05-18

## 2022-05-18 RX ORDER — ATORVASTATIN CALCIUM 10 MG/1
10 TABLET, FILM COATED ORAL DAILY
Qty: 90 TABLET | Refills: 3 | Status: CANCELLED | OUTPATIENT
Start: 2022-05-18

## 2022-05-18 NOTE — PROGRESS NOTES
Subjective:      Reggie Levine is a 75 y.o. female who presents today for   Chief Complaint   Patient presents with    Diabetes   patient in today for follow up on chronic medical issues. She has no complaints    Type 2 DM  Compliant with metformin  She has gained a few pounds due to dietary indiscretion    Hypertension-  Compliant with losartan    Hyperlipidemia- compliant with statin        Patient Active Problem List    Diagnosis Date Noted    Severe obesity ([facility] 04/05/2019    Diabetes mellitus type 2, controlled ([facility] 09/09/2016    Encounter for immunization 09/09/2016    Hyperlipidemia 09/09/2016    Essential hypertension with goal blood pressure less than 140/90 09/09/2016    Gastroesophageal reflux disease without esophagitis 09/09/2016    Symptomatic menopausal or female climacteric states 04/05/2012    Acquired absence of both cervix and uterus 04/05/2012       Current Outpatient Medications   Medication Sig Dispense Refill    atorvastatin (LIPITOR) 10 mg tablet Take 1 Tablet by mouth daily. 90 Tablet 3    losartan (COZAAR) 25 mg tablet Take 1 Tablet by mouth daily. 90 Tablet 3    levothyroxine (SYNTHROID) 25 mcg tablet Take 1 Tablet by mouth Daily (before breakfast). 90 Tablet 3    metFORMIN (GLUCOPHAGE) 500 mg tablet TAKE 1 TABLET BY MOUTH TWICE DAILY WITH MEALS 180 Tablet 3    Blood-Glucose Meter monitoring kit One touch ultra meter check blood glucose twice daily 1 Kit 0    glucose blood VI test strips (ASCENSIA AUTODISC VI, ONE TOUCH ULTRA TEST VI) strip One touch ultra test strips. Test blood sugars once daily DX E11.9 100 Strip 3    lancets misc Check blood glucose once daily. DX E11.9 100 Each 3    fluticasone (FLONASE ALLERGY RELIEF) 50 mcg/actuation nasal spray 2 Sprays by Both Nostrils route daily.  calcium carbonate (CALTREX) 600 mg (1,500 mg) tablet Take 600 mg by mouth two (2) times a day.       cholecalciferol, vitamin D3, (VITAMIN D3) 2,000 unit tab Take  by mouth. Allergies   Allergen Reactions    Penicillins Unknown (comments)     Past Medical History:   Diagnosis Date    Acquired absence of both cervix and uterus 4/5/2012    Diabetes ([address]     Hx of bone density study 11/25/2015    Osteopenia    Hx of mammogram 2/25/13    Negative    Hypercholesterolemia     Osteopenia 2015,11/11    Mild    Pap smear for cervical cancer screening 5/8/06    Normal Pap    Symptomatic menopausal or female climacteric states 4/5/2012     Past Surgical History:   Procedure Laterality Date    HX BREAST BIOPSY  years ago     Neg    HX TOTAL ABDOMINAL HYSTERECTOMY  10/2003     Family History   Problem Relation Age of Onset    Diabetes Sister     Cancer Father         Lung/Prostate    Mult Sclerosis Mother      Social History     Tobacco Use    Smoking status: Never Smoker    Smokeless tobacco: Never Used   Substance Use Topics    Alcohol use: Yes     Alcohol/week: 3.0 standard drinks     Types: 3 Glasses of wine per week     Comment: weekly        Review of Systems    A comprehensive review of systems was negative except for that written in the HPI. Objective:     Visit Vitals  /77 (BP 1 Location: Left upper arm, BP Patient Position: Sitting)   Pulse 81   Temp 98 °F (36.7 °C) (Oral)   Resp 18   Ht 5' 3\" (1.6 m)   Wt 214 lb (97.1 kg)   SpO2 97%   BMI 37.91 kg/m²     General:  Alert, cooperative, no distress, appears stated age. Head:  Normocephalic, without obvious abnormality, atraumatic. Eyes:  Conjunctivae/corneas clear. PERRL, EOMs intact. Ears:  Normal TMs and external ear canals both ears. Nose: Nares normal. Septum midline. Mucosa normal. No drainage or sinus tenderness. Throat: Lips, mucosa, and tongue normal. Teeth and gums normal.   Neck: Supple, symmetrical, trachea midline, no adenopathy, thyroid: no enlargement/tenderness/nodules, no carotid bruit and no JVD. Back:   Symmetric, no curvature. ROM normal. No CVA tenderness.    Lungs: Clear to auscultation bilaterally. Chest wall:  No tenderness or deformity. Heart:  Regular rate and rhythm, S1, S2 normal, no murmur, click, rub or gallop. Abdomen:   Soft, non-tender. Bowel sounds normal. No masses,  No organomegaly. Extremities: Extremities normal, atraumatic, no cyanosis or edema. Pulses: 2+ and symmetric all extremities. Skin: Skin color, texture, turgor normal. No rashes or lesions. Lymph nodes: Cervical, supraclavicular, and axillary nodes normal.   Neurologic: CNII-XII intact. Normal strength, sensation and reflexes throughout. Assessment/Plan:       ICD-10-CM ICD-9-CM    1. Essential hypertension with goal blood pressure less than 140/90  E04 713.4 METABOLIC PANEL, COMPREHENSIVE      METABOLIC PANEL, COMPREHENSIVE   2. Controlled type 2 diabetes mellitus without complication, without long-term current use of insulin ([facility])  R69.8 106.94 METABOLIC PANEL, COMPREHENSIVE      HEMOGLOBIN A1C WITH EAG      HEMOGLOBIN A1C WITH EAG      METABOLIC PANEL, COMPREHENSIVE   3. Hyperlipidemia, unspecified hyperlipidemia type  S83.1 579.4 METABOLIC PANEL, COMPREHENSIVE      LIPID PANEL      LIPID PANEL      METABOLIC PANEL, COMPREHENSIVE   4. Severe obesity (BMI 35.0-39. 9) with comorbidity ([facility]  E66.01 278.01 Continue diet and weight loss efforts          Advised her to call back or return to office if symptoms worsen/change/persist.  Discussed expected course/resolution/complications of diagnosis in detail with patient. Medication risks/benefits/costs/interactions/alternatives discussed with patient. She was given an after visit summary which includes diagnoses, current medications, & vitals. She expressed understanding with the diagnosis and plan.

## 2022-05-18 NOTE — PROGRESS NOTES
Lou Amador is a 75 y.o. female    Chief Complaint   Patient presents with    Diabetes     1. Have you been to the ER, urgent care clinic since your last visit? Hospitalized since your last visit? No      2. Have you seen or consulted any other health care providers outside of the [address] [place] [address] since your last visit? Include any pap smears or colon screening.   No

## 2022-05-19 LAB
ALBUMIN SERPL-MCNC: 4.1 G/DL (ref 3.5–5)
ALBUMIN/GLOB SERPL: 1.1 {RATIO} (ref 1.1–2.2)
ALP SERPL-CCNC: 65 U/L (ref 45–117)
ALT SERPL-CCNC: 41 U/L (ref 12–78)
ANION GAP SERPL CALC-SCNC: 7 MMOL/L (ref 5–15)
AST SERPL-CCNC: 28 U/L (ref 15–37)
BILIRUB SERPL-MCNC: 0.3 MG/DL (ref 0.2–1)
BUN SERPL-MCNC: 11 MG/DL (ref 6–20)
BUN/CREAT SERPL: 17 (ref 12–20)
CALCIUM SERPL-MCNC: 9.5 MG/DL (ref 8.5–10.1)
CHLORIDE SERPL-SCNC: 104 MMOL/L (ref 97–108)
CHOLEST SERPL-MCNC: 131 MG/DL
CO2 SERPL-SCNC: 27 MMOL/L (ref 21–32)
CREAT SERPL-MCNC: 0.64 MG/DL (ref 0.55–1.02)
EST. AVERAGE GLUCOSE BLD GHB EST-MCNC: 160 MG/DL
GLOBULIN SER CALC-MCNC: 3.9 G/DL (ref 2–4)
GLUCOSE SERPL-MCNC: 143 MG/DL (ref 65–100)
HBA1C MFR BLD: 7.2 % (ref 4–5.6)
HDLC SERPL-MCNC: 37 MG/DL
HDLC SERPL: 3.5 {RATIO} (ref 0–5)
LDLC SERPL CALC-MCNC: 66.4 MG/DL (ref 0–100)
POTASSIUM SERPL-SCNC: 4.3 MMOL/L (ref 3.5–5.1)
PROT SERPL-MCNC: 8 G/DL (ref 6.4–8.2)
SODIUM SERPL-SCNC: 138 MMOL/L (ref 136–145)
TRIGL SERPL-MCNC: 138 MG/DL (ref ?–150)
VLDLC SERPL CALC-MCNC: 27.6 MG/DL

## 2023-03-11 LAB
HBA1C MFR BLD HPLC: 7.4 %
LDL CHOLESTEROL, EXTERNAL: 74

## 2023-05-21 RX ORDER — ATORVASTATIN CALCIUM 10 MG/1
10 TABLET, FILM COATED ORAL DAILY
COMMUNITY
Start: 2021-12-16

## 2023-05-21 RX ORDER — FLUTICASONE PROPIONATE 50 MCG
2 SPRAY, SUSPENSION (ML) NASAL DAILY
COMMUNITY

## 2023-05-21 RX ORDER — LEVOTHYROXINE SODIUM 0.03 MG/1
25 TABLET ORAL
COMMUNITY
Start: 2021-12-16

## 2023-05-21 RX ORDER — LANCETS 30 GAUGE
EACH MISCELLANEOUS
COMMUNITY
Start: 2021-08-24

## 2023-05-21 RX ORDER — LOSARTAN POTASSIUM 25 MG/1
25 TABLET ORAL DAILY
COMMUNITY
Start: 2021-12-16

## 2023-08-01 LAB
HBA1C MFR BLD HPLC: 6.6 %
LDL CHOLESTEROL, EXTERNAL: 61

## 2023-11-14 LAB
HBA1C MFR BLD HPLC: 6.9 %
LDL CHOLESTEROL, EXTERNAL: 80

## 2024-03-03 SDOH — HEALTH STABILITY: PHYSICAL HEALTH: ON AVERAGE, HOW MANY DAYS PER WEEK DO YOU ENGAGE IN MODERATE TO STRENUOUS EXERCISE (LIKE A BRISK WALK)?: 0 DAYS

## 2024-03-03 SDOH — HEALTH STABILITY: PHYSICAL HEALTH: ON AVERAGE, HOW MANY MINUTES DO YOU ENGAGE IN EXERCISE AT THIS LEVEL?: 0 MIN

## 2024-03-05 ENCOUNTER — OFFICE VISIT (OUTPATIENT)
Age: 69
End: 2024-03-05
Payer: MEDICARE

## 2024-03-05 VITALS
DIASTOLIC BLOOD PRESSURE: 79 MMHG | WEIGHT: 210.8 LBS | SYSTOLIC BLOOD PRESSURE: 138 MMHG | RESPIRATION RATE: 18 BRPM | TEMPERATURE: 97.1 F | HEART RATE: 82 BPM | HEIGHT: 63 IN | OXYGEN SATURATION: 95 % | BODY MASS INDEX: 37.35 KG/M2

## 2024-03-05 DIAGNOSIS — Z76.89 ENCOUNTER TO ESTABLISH CARE WITH NEW DOCTOR: Primary | ICD-10-CM

## 2024-03-05 DIAGNOSIS — E11.9 CONTROLLED TYPE 2 DIABETES MELLITUS WITHOUT COMPLICATION, WITHOUT LONG-TERM CURRENT USE OF INSULIN (HCC): ICD-10-CM

## 2024-03-05 DIAGNOSIS — E03.8 OTHER SPECIFIED HYPOTHYROIDISM: ICD-10-CM

## 2024-03-05 PROBLEM — M85.80 OSTEOPENIA: Status: ACTIVE | Noted: 2024-03-05

## 2024-03-05 LAB
CREAT UR-MCNC: 114 MG/DL
ERYTHROCYTE [DISTWIDTH] IN BLOOD BY AUTOMATED COUNT: 13 % (ref 11.5–14.5)
EST. AVERAGE GLUCOSE BLD GHB EST-MCNC: 157 MG/DL
HBA1C MFR BLD: 7.1 % (ref 4–5.6)
HCT VFR BLD AUTO: 39.8 % (ref 35–47)
HGB BLD-MCNC: 13 G/DL (ref 11.5–16)
MCH RBC QN AUTO: 27.7 PG (ref 26–34)
MCHC RBC AUTO-ENTMCNC: 32.7 G/DL (ref 30–36.5)
MCV RBC AUTO: 84.7 FL (ref 80–99)
MICROALBUMIN UR-MCNC: 1.49 MG/DL
MICROALBUMIN/CREAT UR-RTO: 13 MG/G (ref 0–30)
NRBC # BLD: 0 K/UL (ref 0–0.01)
NRBC BLD-RTO: 0 PER 100 WBC
PLATELET # BLD AUTO: 195 K/UL (ref 150–400)
PMV BLD AUTO: 10 FL (ref 8.9–12.9)
RBC # BLD AUTO: 4.7 M/UL (ref 3.8–5.2)
WBC # BLD AUTO: 7.7 K/UL (ref 3.6–11)

## 2024-03-05 PROCEDURE — G8417 CALC BMI ABV UP PARAM F/U: HCPCS | Performed by: STUDENT IN AN ORGANIZED HEALTH CARE EDUCATION/TRAINING PROGRAM

## 2024-03-05 PROCEDURE — 99214 OFFICE O/P EST MOD 30 MIN: CPT | Performed by: STUDENT IN AN ORGANIZED HEALTH CARE EDUCATION/TRAINING PROGRAM

## 2024-03-05 PROCEDURE — G8399 PT W/DXA RESULTS DOCUMENT: HCPCS | Performed by: STUDENT IN AN ORGANIZED HEALTH CARE EDUCATION/TRAINING PROGRAM

## 2024-03-05 PROCEDURE — 1090F PRES/ABSN URINE INCON ASSESS: CPT | Performed by: STUDENT IN AN ORGANIZED HEALTH CARE EDUCATION/TRAINING PROGRAM

## 2024-03-05 PROCEDURE — G8484 FLU IMMUNIZE NO ADMIN: HCPCS | Performed by: STUDENT IN AN ORGANIZED HEALTH CARE EDUCATION/TRAINING PROGRAM

## 2024-03-05 PROCEDURE — G8427 DOCREV CUR MEDS BY ELIG CLIN: HCPCS | Performed by: STUDENT IN AN ORGANIZED HEALTH CARE EDUCATION/TRAINING PROGRAM

## 2024-03-05 PROCEDURE — 1036F TOBACCO NON-USER: CPT | Performed by: STUDENT IN AN ORGANIZED HEALTH CARE EDUCATION/TRAINING PROGRAM

## 2024-03-05 PROCEDURE — 3046F HEMOGLOBIN A1C LEVEL >9.0%: CPT | Performed by: STUDENT IN AN ORGANIZED HEALTH CARE EDUCATION/TRAINING PROGRAM

## 2024-03-05 PROCEDURE — 1123F ACP DISCUSS/DSCN MKR DOCD: CPT | Performed by: STUDENT IN AN ORGANIZED HEALTH CARE EDUCATION/TRAINING PROGRAM

## 2024-03-05 PROCEDURE — 3017F COLORECTAL CA SCREEN DOC REV: CPT | Performed by: STUDENT IN AN ORGANIZED HEALTH CARE EDUCATION/TRAINING PROGRAM

## 2024-03-05 PROCEDURE — 2022F DILAT RTA XM EVC RTNOPTHY: CPT | Performed by: STUDENT IN AN ORGANIZED HEALTH CARE EDUCATION/TRAINING PROGRAM

## 2024-03-05 RX ORDER — SITAGLIPTIN 50 MG/1
50 TABLET, FILM COATED ORAL DAILY
COMMUNITY
Start: 2024-01-31

## 2024-03-05 RX ORDER — MULTIVIT-MIN/FERROUS GLUCONATE 9 MG/15 ML
15 LIQUID (ML) ORAL DAILY
COMMUNITY

## 2024-03-05 RX ORDER — VIT C/B6/B5/MAGNESIUM/HERB 173 50-5-6-5MG
CAPSULE ORAL
COMMUNITY

## 2024-03-05 SDOH — ECONOMIC STABILITY: FOOD INSECURITY: WITHIN THE PAST 12 MONTHS, THE FOOD YOU BOUGHT JUST DIDN'T LAST AND YOU DIDN'T HAVE MONEY TO GET MORE.: NEVER TRUE

## 2024-03-05 SDOH — ECONOMIC STABILITY: HOUSING INSECURITY
IN THE LAST 12 MONTHS, WAS THERE A TIME WHEN YOU DID NOT HAVE A STEADY PLACE TO SLEEP OR SLEPT IN A SHELTER (INCLUDING NOW)?: NO

## 2024-03-05 SDOH — ECONOMIC STABILITY: FOOD INSECURITY: WITHIN THE PAST 12 MONTHS, YOU WORRIED THAT YOUR FOOD WOULD RUN OUT BEFORE YOU GOT MONEY TO BUY MORE.: NEVER TRUE

## 2024-03-05 SDOH — ECONOMIC STABILITY: INCOME INSECURITY: HOW HARD IS IT FOR YOU TO PAY FOR THE VERY BASICS LIKE FOOD, HOUSING, MEDICAL CARE, AND HEATING?: NOT HARD AT ALL

## 2024-03-05 ASSESSMENT — PATIENT HEALTH QUESTIONNAIRE - PHQ9
2. FEELING DOWN, DEPRESSED OR HOPELESS: 0
SUM OF ALL RESPONSES TO PHQ QUESTIONS 1-9: 0
SUM OF ALL RESPONSES TO PHQ QUESTIONS 1-9: 0
SUM OF ALL RESPONSES TO PHQ9 QUESTIONS 1 & 2: 0
SUM OF ALL RESPONSES TO PHQ QUESTIONS 1-9: 0
SUM OF ALL RESPONSES TO PHQ QUESTIONS 1-9: 0
1. LITTLE INTEREST OR PLEASURE IN DOING THINGS: 0

## 2024-03-05 NOTE — ASSESSMENT & PLAN NOTE
Update A1c to assess control  We talked about diet, she does enjoy carbs and is not doing much to limit this. She doesn't drink soda.   Will continue metformin 500mg BID  She was started on Januvia 6 mo ago, we talked about switching to rybelsus or SGLT2 when she is due for refill. She will check with insurance regarding coverage. I am leaning toward rybelsus for her if covered but either option would be OK  Will update A1c and microalbumin today  She is on losartan for nephroprotection, BP can tolerate, continue  She is on atorvastatin, reviewed lab from 11/2023, LDL at goal, continue

## 2024-03-05 NOTE — ASSESSMENT & PLAN NOTE
She is on a very low dose of levothyroxine. Her TSH in Nov was on the lower end of the normal range. We will stop levothyroxine 6 weeks prior to the next visit and repeat TFTs at the visit.

## 2024-03-05 NOTE — PROGRESS NOTES
Nory Noe is a 68 y.o. year old female who is a new patient to me today (03/05/24).  She was previous followed by Dr Lisa Wilson, last seen 8/2023 at [facility] .      Assessment & Plan:   1. Encounter to establish care with new doctor  - will request records from [facility]  - I was able to review labs from 11/14/2023 with care everywhere episode summary - TSH, free T4, CMP, lipid, A1c  2. Controlled type 2 diabetes mellitus without complication, without long-term current use of insulin (HCC)  Assessment & Plan:  Update A1c to assess control  We talked about diet, she does enjoy carbs and is not doing much to limit this. She doesn't drink soda.   Will continue metformin 500mg BID  She was started on Januvia 6 mo ago, we talked about switching to rybelsus or SGLT2 when she is due for refill. She will check with insurance regarding coverage. I am leaning toward rybelsus for her if covered but either option would be OK  Will update A1c and microalbumin today  She is on losartan for nephroprotection, BP can tolerate, continue  She is on atorvastatin, reviewed lab from 11/2023, LDL at goal, continue   Orders:  -     Hemoglobin A1C; Future  -     Microalbumin / Creatinine Urine Ratio; Future  -     CBC; Future  3. Other specified hypothyroidism  Assessment & Plan:  She is on a very low dose of levothyroxine. Her TSH in Nov was on the lower end of the normal range. We will stop levothyroxine 6 weeks prior to the next visit and repeat TFTs at the visit.        Health Maintenance   Flu vaccine: 10/2023  COVID vaccine: 10/2023  RSV: 10/2023  Tetanus vaccine:  Shingles vaccine:  Pneumonia vaccine:  Cervical cancer screening:  Breast cancer screening:  Colon cancer screening:  DEXA:  Lung cancer screening:  Hep C:  HIV:  Lipid:  DM:  Healthcare decision maker:    ACP:      RTC: 3 mo DM, thyroid    Subjective:   Nory was seen today for New Patient and Establish

## 2024-03-05 NOTE — PROGRESS NOTES
Chief Complaint   Patient presents with    New Patient    Establish Care     Blood pressure 138/79, pulse 82, temperature 97.1 °F (36.2 °C), temperature source Temporal, resp. rate 18, height 1.59 m (5' 2.6\"), weight 95.6 kg (210 lb 12.8 oz), SpO2 95 %.

## 2024-03-05 NOTE — PATIENT INSTRUCTIONS
Stop levothyroxine 6 weeks before our follow-up appointment.     Speak with insurance   - Rybelsus 3mg daily for 1 mo, then increase to 7mg daily   - farxiga 5mg OR jardiance 10mg daily   Let me know when you are low on Januvia and I will send in one of these options    Call my office or send me a [employer] message when you need a refill of your medications.

## 2024-03-20 ENCOUNTER — PATIENT MESSAGE (OUTPATIENT)
Age: 69
End: 2024-03-20

## 2024-03-20 RX ORDER — LOSARTAN POTASSIUM 25 MG/1
25 TABLET ORAL DAILY
Qty: 90 TABLET | Refills: 2 | Status: SHIPPED | OUTPATIENT
Start: 2024-03-20

## 2024-04-15 ENCOUNTER — HOSPITAL ENCOUNTER (OUTPATIENT)
Age: 69
Discharge: HOME | End: 2024-04-18
Payer: MEDICARE

## 2024-04-15 ENCOUNTER — PATIENT MESSAGE (OUTPATIENT)
Age: 69
End: 2024-04-15

## 2024-04-15 VITALS — BODY MASS INDEX: 36.68 KG/M2 | WEIGHT: 207 LBS | HEIGHT: 63 IN

## 2024-04-15 DIAGNOSIS — E11.9 CONTROLLED TYPE 2 DIABETES MELLITUS WITHOUT COMPLICATION, WITHOUT LONG-TERM CURRENT USE OF INSULIN (HCC): Primary | ICD-10-CM

## 2024-04-15 DIAGNOSIS — Z12.31 VISIT FOR SCREENING MAMMOGRAM: ICD-10-CM

## 2024-04-15 PROCEDURE — 77063 BREAST TOMOSYNTHESIS BI: CPT

## 2024-04-16 RX ORDER — ORAL SEMAGLUTIDE 3 MG/1
3 TABLET ORAL
Qty: 30 TABLET | Refills: 0 | Status: SHIPPED | OUTPATIENT
Start: 2024-04-16 | End: 2024-04-17 | Stop reason: SDUPTHER

## 2024-04-17 DIAGNOSIS — E11.9 CONTROLLED TYPE 2 DIABETES MELLITUS WITHOUT COMPLICATION, WITHOUT LONG-TERM CURRENT USE OF INSULIN (HCC): ICD-10-CM

## 2024-04-17 RX ORDER — ORAL SEMAGLUTIDE 3 MG/1
3 TABLET ORAL
Qty: 30 TABLET | Refills: 0 | Status: SHIPPED | OUTPATIENT
Start: 2024-04-17

## 2024-05-06 ENCOUNTER — TELEPHONE (OUTPATIENT)
Facility: HOSPITAL | Age: 69
End: 2024-05-06

## 2024-05-06 ENCOUNTER — HOSPITAL ENCOUNTER (OUTPATIENT)
Facility: HOSPITAL | Age: 69
Discharge: HOME | End: 2024-05-09
Attending: STUDENT IN AN ORGANIZED HEALTH CARE EDUCATION/TRAINING PROGRAM
Payer: MEDICARE

## 2024-05-06 DIAGNOSIS — R92.8 ABNORMAL MAMMOGRAM: ICD-10-CM

## 2024-05-06 PROCEDURE — G0279 TOMOSYNTHESIS, MAMMO: HCPCS

## 2024-05-06 PROCEDURE — 76642 ULTRASOUND BREAST LIMITED: CPT

## 2024-05-06 NOTE — TELEPHONE ENCOUNTER
Patient came in for ultrasound and mammo today, based on the results the patient was recommended for an Ultrasound guided LT breast biopsy by the radiologist. Results were discussed with patient from the radiologist, pt was with understanding. Patient Is tentatively scheduled to come back this coming week for her biopsy, if this is okay to proceed, if you could please place the following orders since we are both [facility].     Epic codes;     IMG 89436 (US guided LT breast biopsy with Clip insertion)  Dx code: n63.25 (mass at 6 oclock)       Also, if this is something that needs to be seen by a surgeon once results come back, if you could please put name of preferred surgeon in comments and we will coordinate that for this patient.       If any questions please reach out,     Irina PAIGE  [facility] Breast Biopsy Coordinator  [phone] (F)

## 2024-05-07 DIAGNOSIS — E11.9 CONTROLLED TYPE 2 DIABETES MELLITUS WITHOUT COMPLICATION, WITHOUT LONG-TERM CURRENT USE OF INSULIN (HCC): ICD-10-CM

## 2024-05-07 RX ORDER — ORAL SEMAGLUTIDE 3 MG/1
3 TABLET ORAL
Qty: 30 TABLET | Refills: 0 | Status: CANCELLED | OUTPATIENT
Start: 2024-05-07

## 2024-05-08 RX ORDER — ORAL SEMAGLUTIDE 7 MG/1
1 TABLET ORAL
Qty: 90 TABLET | Refills: 1 | Status: SHIPPED | OUTPATIENT
Start: 2024-05-08

## 2024-05-14 ENCOUNTER — HOSPITAL ENCOUNTER (OUTPATIENT)
Facility: HOSPITAL | Age: 69
Discharge: HOME | End: 2024-05-17
Attending: STUDENT IN AN ORGANIZED HEALTH CARE EDUCATION/TRAINING PROGRAM
Payer: MEDICARE

## 2024-05-14 ENCOUNTER — HOSPITAL ENCOUNTER (OUTPATIENT)
Facility: HOSPITAL | Age: 69
Discharge: HOME | End: 2024-05-17
Payer: MEDICARE

## 2024-05-14 DIAGNOSIS — N64.59 ABNORMAL BREAST EXAM: ICD-10-CM

## 2024-05-14 DIAGNOSIS — N63.25 BREAST LUMP ON LEFT SIDE AT 6 O'CLOCK POSITION: ICD-10-CM

## 2024-05-14 PROCEDURE — 88360 TUMOR IMMUNOHISTOCHEM/MANUAL: CPT

## 2024-05-14 PROCEDURE — 19083 BX BREAST 1ST LESION US IMAG: CPT

## 2024-05-14 PROCEDURE — 88342 IMHCHEM/IMCYTCHM 1ST ANTB: CPT

## 2024-05-14 PROCEDURE — 77065 DX MAMMO INCL CAD UNI: CPT

## 2024-05-14 PROCEDURE — 88305 TISSUE EXAM BY PATHOLOGIST: CPT

## 2024-05-14 RX ORDER — LIDOCAINE HYDROCHLORIDE AND EPINEPHRINE 10; 10 MG/ML; UG/ML
20 INJECTION, SOLUTION INFILTRATION; PERINEURAL ONCE
Status: DISCONTINUED | OUTPATIENT
Start: 2024-05-14 | End: 2024-05-18 | Stop reason: HOSPADM

## 2024-05-14 RX ORDER — LIDOCAINE HYDROCHLORIDE 10 MG/ML
20 INJECTION, SOLUTION EPIDURAL; INFILTRATION; INTRACAUDAL; PERINEURAL ONCE
Status: DISCONTINUED | OUTPATIENT
Start: 2024-05-14 | End: 2024-05-18 | Stop reason: HOSPADM

## 2024-05-14 NOTE — PROGRESS NOTES
1014 - pt. Ambulated to mammo dept. Without difficulty accomp. By Irina U/S tech without difficulty for her post left breast biopsy mammogram.  Dressing left breast 5-6 o'clock site noted clean/dry/intact without bleeding, swelling or pain.    1015 - left breast sample to gross room via nurse.    1018 - left breast sample signed into lab log via nurse.    1028 - ice pack with instructions on use placed onto pt. Left breast biopsy site with pt. Informing understanding of all instructions.    1036 - I have reviewed discharge instructions with the patient.  The patient verbalized understanding.  Copy of discharge instructions per AVS copied, reviewed with pt. And copy to pt. Prior to discharge.  Pt stable without c/o pain.  Dr. Bowden in to ok pt. For discharge.  Ice pack intact left breast.  Left breast biopsy site bandage noted as above 1014 note.  Pt dressed self and ambulated to waiting room without difficulty for discharge to home via private vehicle driving herself home.  Pt accomp. By nurse to waiting room.

## 2024-05-14 NOTE — DISCHARGE INSTRUCTIONS
[facility]  [address] [place] [address]  [phone]      Breast Biopsy Discharge Instructions      1. After the biopsy, we will place a clean covered ice pack over the biopsy site, within the bra - you should leave the ice pack on 30 minutes and then remove the ice pack for 1-2 hours until bedtime.  If needed you can continue applying ice the following day. It is a good idea to wear your bra for support, both day and night unless this causes you discomfort.     2. You may take Extra-Strength Tylenol (two tablets) every 4 to 6 hours as needed for pain.  Do not take aspirin or aspirin products (e.g. ibuprofen, Advil, Motrin) as these may cause more bleeding.     3. You may expect some bruising and skin discoloration in the biopsy area.  This is normal and generally should resolve in 5 to 7 days.     4. Most women do not find it necessary to restrict their activities after the procedure. You should rest as needed on the day of your biopsy.  The next day, if you are feeling okay, you may resume your regular work/activity schedule.  Avoid strenuous activity and heavy lifting, jogging, aerobics, or vacuuming for 48 hours after the procedure.     5. 48 hours after your biopsy, remove the large outer dressing and leave the steri-strips (tiny pieces of tape) in place.  The steri-strips will usually fall off in a few days.  You may shower 48 hours after your biopsy and you may get the steri-strips wet.  If still present after 4 days, you may gently peel the strips off.  Keep the area clean and dry and shower daily.     6. If you have bleeding from the incision area, hold firm pressure on the area for 20 minutes.  This should control any slight oozing that might occur.  If you develop persistent bleeding or pain which does not respond to the above measures or if you develop a fever, excessive swelling, redness, heat or drainage, please call the Stereotactic Breast Health Navigator at

## 2024-05-16 ENCOUNTER — TELEPHONE (OUTPATIENT)
Facility: HOSPITAL | Age: 69
End: 2024-05-16

## 2024-05-16 NOTE — TELEPHONE ENCOUNTER
Dr. Gael Bowden reviewed pathology results and called results to patient.  Patient has an appointment with Dr. Sherman Alatorre on May 31st at 130.   Pt was notified and with understanding.   Email was also sent to the Nurse Navigator.

## 2024-05-16 NOTE — RESULT ENCOUNTER NOTE
Called pt to review dx. She is set up with Dr Alatorre for later this month. No additional questions at the moment. We will meet in about a month to follow-up her diabetes and thyoid.

## 2024-05-22 ENCOUNTER — TELEPHONE (OUTPATIENT)
Facility: HOSPITAL | Age: 69
End: 2024-05-22

## 2024-05-22 NOTE — TELEPHONE ENCOUNTER
first.  I told her if he orders a breast MRI I can help her get this set up pretty quickly when it is convenient for her.      Provided the patient with my contact information and discussed my role in her care.       I will continue to follow the patient.            Tracie Villa RN, BSN, [facility]  Oncology Breast Navigator     [address]  [address]  W: [phone]  F: [phone]  [email or web address]  Good Help to Those in Need®

## 2024-05-23 ENCOUNTER — CLINICAL DOCUMENTATION (OUTPATIENT)
Age: 69
End: 2024-05-23

## 2024-05-23 NOTE — PROGRESS NOTES
Received disability copies over fax. Patient's first appointment is 05/31. Contacted patient to inform her that we do not complete these until we know the next steps. Patient was understanding and requested for us to scan in her chart.

## 2024-05-31 ENCOUNTER — TELEPHONE (OUTPATIENT)
Age: 69
End: 2024-05-31

## 2024-05-31 ENCOUNTER — CLINICAL DOCUMENTATION (OUTPATIENT)
Age: 69
End: 2024-05-31

## 2024-05-31 ENCOUNTER — OFFICE VISIT (OUTPATIENT)
Age: 69
End: 2024-05-31

## 2024-05-31 VITALS
HEART RATE: 88 BPM | BODY MASS INDEX: 36.68 KG/M2 | SYSTOLIC BLOOD PRESSURE: 158 MMHG | WEIGHT: 207 LBS | HEIGHT: 63 IN | DIASTOLIC BLOOD PRESSURE: 78 MMHG

## 2024-05-31 DIAGNOSIS — C50.912 INVASIVE DUCTAL CARCINOMA OF BREAST, FEMALE, LEFT (HCC): Primary | ICD-10-CM

## 2024-05-31 NOTE — PROGRESS NOTES
HISTORY OF PRESENT ILLNESS  Nory Noe is a 69 y.o. female     HPI NEW patient referral for consultation by Dr. Loredo for an abnormal mammogram that led to a biopsy and breast cancer diagnosis. The patient is here to discuss her breast cancer surgery options. She denies any palpable lumps, nipple inversion or discharge.     Sozo Baseline measurement 7.5 within normal range.     Family history -   Father - prostate,melanoma, lung cancer with mets to brain.  at age 83  Paternal cousin - breast cancer in her 50's and survived.  Paternal cousin - colon cancer in his 60's and survived.  Paternal grandfather - lung cancer  Brother - Non Hodgkin's lymphoma survived.   Maternal uncle - lung cancer  at age 80.  Paternal cousin - melanoma and survived. Age unknown      Review of Systems      Physical Exam       ASSESSMENT and PLAN  {Assessment and Plan Chronic Disease:[id number]}    
Diagnosis Orders   1. Invasive ductal carcinoma of breast, female, left (HCC)          NEW presents for consultation for treatment of LEFT breast IDC, ER+(100%)/WA+(100%)/HER-2-, Ki-67 7%, clinical stage 1. Nothing palpable on physical exam. LEFT breast US visualizes a small mass and biopsy clip at 5-6:00. No LN on US in axilla. Pt has bloody nipple discharge when tumor is pressed.     We had a long discussion of options for treatment. The patient was accompanied by her  and niece during this encounter, and over half the time was spent on counseling and coordination of care. We discussed in depth the pathology results, and the need for treatment for extent of disease and surgical planning. The goals of treatment are to treat the breast, and to reduce risk of local or distant recurrence.    Discussed treatment options with risks, complications, benefits, and limitations, including lumpectomy with XRT, and mastectomy with optional reconstruction, both having the same cure rate. Explained the importance of negative margins, and the need for re-excision in the case of positive margins. Will plan to mobilize breast tissue during lumpectomy to minimize cosmetic defect. Also discussed benefit and technique of SLNBx of 3-4 LNs. Reviewed possibility of limited ALND for 3 or more positive LNs.    Discussed that in women over 70 with lumpectomy (with small tumor and negative LNs) and AI, and without XRT, there is a slightly increased risk of recurrence, but that this does not affect overall cure rate.    MammaPrint and MRI have been ordered. Will decide on LN based on MP results. We also discussed the pt and her family members' questions and concerns. Discussed possible use of hormone blockers depending on risk. Pt is leaning towards lumpectomy but wishes to wait for MP results to schedule. Will prescribe Xanax for MRI.     Pt should feel free to call Mariana Villa or Jayna Palacios, nurse navigators, with any further

## 2024-05-31 NOTE — PROGRESS NOTES
NCCN Distress Thermometer    Date Screening Completed: 5/31/24    Screening Declined:  [] Yes    Number that best describes how much distress you've experienced in the past week, including today?  0 [x] - No distress 1 []      2 []      3 []      4 []       5 []       6 []      7 []      8 []      9 []       10 [] - Extreme distress    PROBLEM LIST  Have you had concerns about any of the items below in the past week, including today?      Physical Concerns Practical Concerns   [] Pain [] Taking care of myself    [] Sleep [] Taking care of others    [] Fatigue [] Work   [] Tobacco use  [] School   [] Substance use  [] Housing   [] Memory or concentration [] Finances   [] Sexual health [] Insurance   [] Changes in eating  [] Transportation   [] Loss or change of physical abilities  []     [] Having enough food   Emotional Concerns [] Access to medicine   [] Worry or anxiety [] Treatment decisions   [] Sadness or depression    [] Loss of interest or enjoyment  Spiritual or Mormon Concerns   [] Grief or loss  [] Sense of meaning or purpose   [] Fear [] Changes in radha or beliefs   [] Loneliness  [] Death, dying, or afterlife   [] Anger [] Conflict between beliefs and cancer treatments    [] Changes in appearance [] Relationship with the sacred   [] Feelings of worthlessness or being a burden [] Ritual or dietary needs        Social Concerns     [] Relationship with spouse or partner     [] Relationship with children    [] Relationship with family members     [x] Relationship with friends or coworkers     [] Communication with health care team     [] Ability to have children     [] Prejudice or discrimination        Other Concerns:     Patient received resource information and education:  [x] Yes  [] No   Did not see patient at appt; score of 0 does not indicate follow-up for distress screening. SW contact info and [facility] flyer provided in patient's pink folder.

## 2024-06-02 ENCOUNTER — CLINICAL DOCUMENTATION (OUTPATIENT)
Facility: HOSPITAL | Age: 69
End: 2024-06-02

## 2024-06-03 ENCOUNTER — TELEPHONE (OUTPATIENT)
Facility: HOSPITAL | Age: 69
End: 2024-06-03

## 2024-06-03 DIAGNOSIS — C50.912 MALIGNANT NEOPLASM OF LEFT BREAST IN FEMALE, ESTROGEN RECEPTOR POSITIVE, UNSPECIFIED SITE OF BREAST (HCC): Primary | ICD-10-CM

## 2024-06-03 DIAGNOSIS — Z17.0 MALIGNANT NEOPLASM OF LEFT BREAST IN FEMALE, ESTROGEN RECEPTOR POSITIVE, UNSPECIFIED SITE OF BREAST (HCC): Primary | ICD-10-CM

## 2024-06-03 NOTE — PROGRESS NOTES
Order placed for BILATERAL breast MRI with and without contrast per verbal order of Dr. Alatorre.  She is scheduled for 6/10/2024 at [facility] at 10 am.

## 2024-06-03 NOTE — TELEPHONE ENCOUNTER
[facility]  Breast Navigator Encounter    Name:    Nory Noe  Age:    69 y.o.  Diagnosis:   LEFT breast cancer    Patient saw Dr. Alatorre on Friday 5/31.  He asked that I assist her in getting a breast MRI set up.  She is on vacation this week.    I explained the MRI procedure to her.  I confirmed that she is not claustrophobic, does not have breast implants and does not have any metal in her body.  I explained that she can eat and drink normally and can drive herself to and from the appointment.  I told her that she would be asked to remove most metal items.   I was able to get her scheduled for Monday, June 14, 2024 at [facility] at 10 am.      She was very appreciative of the assistance.  I told her that this would be read same day or the next day.                                    Tracie Villa RN, BSN, [facility]  Oncology Breast Navigator     [address]  [address]  W: [phone]  F: [phone]  [email or web address]  Good Help to Those in Need®

## 2024-06-10 ENCOUNTER — HOSPITAL ENCOUNTER (OUTPATIENT)
Facility: HOSPITAL | Age: 69
Discharge: HOME | End: 2024-06-13
Attending: SURGERY
Payer: MEDICARE

## 2024-06-10 DIAGNOSIS — C50.912 MALIGNANT NEOPLASM OF LEFT BREAST IN FEMALE, ESTROGEN RECEPTOR POSITIVE, UNSPECIFIED SITE OF BREAST (HCC): ICD-10-CM

## 2024-06-10 DIAGNOSIS — Z17.0 MALIGNANT NEOPLASM OF LEFT BREAST IN FEMALE, ESTROGEN RECEPTOR POSITIVE, UNSPECIFIED SITE OF BREAST (HCC): ICD-10-CM

## 2024-06-10 LAB — CREAT BLD-MCNC: 0.5 MG/DL (ref 0.6–1.3)

## 2024-06-10 PROCEDURE — 82565 ASSAY OF CREATININE: CPT

## 2024-06-10 PROCEDURE — A9585 GADOBUTROL INJECTION: HCPCS | Performed by: SURGERY

## 2024-06-10 PROCEDURE — C8908 MRI W/O FOL W/CONT, BREAST,: HCPCS

## 2024-06-10 PROCEDURE — 6360000004 HC RX CONTRAST MEDICATION: Performed by: SURGERY

## 2024-06-10 RX ORDER — GADOBUTROL 604.72 MG/ML
9 INJECTION INTRAVENOUS
Status: COMPLETED | OUTPATIENT
Start: 2024-06-10 | End: 2024-06-10

## 2024-06-10 RX ADMIN — GADOBUTROL 9 ML: 604.72 INJECTION INTRAVENOUS at 10:53

## 2024-06-11 ENCOUNTER — CLINICAL DOCUMENTATION (OUTPATIENT)
Age: 69
End: 2024-06-11

## 2024-06-12 ENCOUNTER — OFFICE VISIT (OUTPATIENT)
Age: 69
End: 2024-06-12
Payer: MEDICARE

## 2024-06-12 VITALS
RESPIRATION RATE: 16 BRPM | BODY MASS INDEX: 36.75 KG/M2 | OXYGEN SATURATION: 97 % | WEIGHT: 207.4 LBS | HEART RATE: 100 BPM | HEIGHT: 63 IN | SYSTOLIC BLOOD PRESSURE: 141 MMHG | DIASTOLIC BLOOD PRESSURE: 90 MMHG | TEMPERATURE: 97.1 F

## 2024-06-12 DIAGNOSIS — E55.9 VITAMIN D DEFICIENCY: ICD-10-CM

## 2024-06-12 DIAGNOSIS — E03.8 OTHER SPECIFIED HYPOTHYROIDISM: ICD-10-CM

## 2024-06-12 DIAGNOSIS — Z78.0 MENOPAUSE: ICD-10-CM

## 2024-06-12 DIAGNOSIS — M85.80 OSTEOPENIA, UNSPECIFIED LOCATION: ICD-10-CM

## 2024-06-12 DIAGNOSIS — E66.01 SEVERE OBESITY (BMI 35.0-39.9) WITH COMORBIDITY (HCC): ICD-10-CM

## 2024-06-12 DIAGNOSIS — E11.9 CONTROLLED TYPE 2 DIABETES MELLITUS WITHOUT COMPLICATION, WITHOUT LONG-TERM CURRENT USE OF INSULIN (HCC): Primary | ICD-10-CM

## 2024-06-12 DIAGNOSIS — E11.9 CONTROLLED TYPE 2 DIABETES MELLITUS WITHOUT COMPLICATION, WITHOUT LONG-TERM CURRENT USE OF INSULIN (HCC): ICD-10-CM

## 2024-06-12 PROCEDURE — 2022F DILAT RTA XM EVC RTNOPTHY: CPT | Performed by: STUDENT IN AN ORGANIZED HEALTH CARE EDUCATION/TRAINING PROGRAM

## 2024-06-12 PROCEDURE — 3051F HG A1C>EQUAL 7.0%<8.0%: CPT | Performed by: STUDENT IN AN ORGANIZED HEALTH CARE EDUCATION/TRAINING PROGRAM

## 2024-06-12 PROCEDURE — 1036F TOBACCO NON-USER: CPT | Performed by: STUDENT IN AN ORGANIZED HEALTH CARE EDUCATION/TRAINING PROGRAM

## 2024-06-12 PROCEDURE — G8417 CALC BMI ABV UP PARAM F/U: HCPCS | Performed by: STUDENT IN AN ORGANIZED HEALTH CARE EDUCATION/TRAINING PROGRAM

## 2024-06-12 PROCEDURE — G8427 DOCREV CUR MEDS BY ELIG CLIN: HCPCS | Performed by: STUDENT IN AN ORGANIZED HEALTH CARE EDUCATION/TRAINING PROGRAM

## 2024-06-12 PROCEDURE — 3017F COLORECTAL CA SCREEN DOC REV: CPT | Performed by: STUDENT IN AN ORGANIZED HEALTH CARE EDUCATION/TRAINING PROGRAM

## 2024-06-12 PROCEDURE — 99214 OFFICE O/P EST MOD 30 MIN: CPT | Performed by: STUDENT IN AN ORGANIZED HEALTH CARE EDUCATION/TRAINING PROGRAM

## 2024-06-12 PROCEDURE — 1090F PRES/ABSN URINE INCON ASSESS: CPT | Performed by: STUDENT IN AN ORGANIZED HEALTH CARE EDUCATION/TRAINING PROGRAM

## 2024-06-12 PROCEDURE — 1123F ACP DISCUSS/DSCN MKR DOCD: CPT | Performed by: STUDENT IN AN ORGANIZED HEALTH CARE EDUCATION/TRAINING PROGRAM

## 2024-06-12 PROCEDURE — G8399 PT W/DXA RESULTS DOCUMENT: HCPCS | Performed by: STUDENT IN AN ORGANIZED HEALTH CARE EDUCATION/TRAINING PROGRAM

## 2024-06-12 ASSESSMENT — PATIENT HEALTH QUESTIONNAIRE - PHQ9
SUM OF ALL RESPONSES TO PHQ9 QUESTIONS 1 & 2: 0
SUM OF ALL RESPONSES TO PHQ QUESTIONS 1-9: 0
1. LITTLE INTEREST OR PLEASURE IN DOING THINGS: NOT AT ALL
SUM OF ALL RESPONSES TO PHQ QUESTIONS 1-9: 0
2. FEELING DOWN, DEPRESSED OR HOPELESS: NOT AT ALL
SUM OF ALL RESPONSES TO PHQ QUESTIONS 1-9: 0
SUM OF ALL RESPONSES TO PHQ QUESTIONS 1-9: 0

## 2024-06-12 NOTE — PROGRESS NOTES
Nory Noe is a 69 y.o. year old female who presents today (24) for follow-up.     Assessment & Plan:   1. Controlled type 2 diabetes mellitus without complication, without long-term current use of insulin (HCC)  Assessment & Plan:  Taking metformin and rybelsus   BG running high after her steroid injection from ortho - hopefully this is a one time thing.   Update A1c to assess control on new regimen.   On losartan for nephroprotection and atorvastatin for ASCVD risk reduction  Orders:  -     Hemoglobin A1C; Future  2. Other specified hypothyroidism  Assessment & Plan:  Will update TSH off levothyroxine to determine if she can stay off the medication.    Orders:  -     TSH; Future  3. Osteopenia, unspecified location  Assessment & Plan:  Will update DEXA. She is planning to start aromatase inhibitor for breast cancer which will worsen bone density   She takes Vit D and calcium. Will make sure vit D level adequate  4. Menopause  -     DEXA BONE DENSITY AXIAL SKELETON; Future  5. Vitamin D deficiency  -     Vitamin D 25 Hydroxy; Future  6. Severe obesity (BMI 35.0-39.9) with comorbidity (HCC)   Modest weight loss with rybelsus      Health Maintenance   Flu vaccine: 10/2023  COVID vaccine: 10/2023  RSV: 10/2023  Tetanus vaccine: 2023  Shingles vaccine:  Pneumonia vaccine:  Cervical cancer screening: due this summer - she will call [facility]   Breast cancer screenin2024 - dx with breast ca  Colon cancer screening:  DEXA: 10/2021  Lung cancer screening:  Hep C:  HIV:  Lipid: 2023  DM: check today   Healthcare decision maker:    ACP:    RTC: 3 mo AWV    Subjective:   Nory was seen today for Follow-up (Pt here today for 3 month follow up. Pt would like her PCP to know that she was been diagnosed with breast cancer & that she did see ortho for tendinitis and had an injection that improved her symptoms. Alejandra Smith CMA ) and Medication Refill (Pt is requesting refill on

## 2024-06-13 ENCOUNTER — TELEPHONE (OUTPATIENT)
Age: 69
End: 2024-06-13

## 2024-06-13 DIAGNOSIS — R92.8 ABNORMAL MRI, BREAST: Primary | ICD-10-CM

## 2024-06-13 LAB
25(OH)D3 SERPL-MCNC: 35.3 NG/ML (ref 30–100)
EST. AVERAGE GLUCOSE BLD GHB EST-MCNC: 157 MG/DL
HBA1C MFR BLD: 7.1 % (ref 4–5.6)
TSH SERPL DL<=0.05 MIU/L-ACNC: 1.09 UIU/ML (ref 0.36–3.74)

## 2024-06-13 NOTE — ASSESSMENT & PLAN NOTE
Taking metformin and rybelsus   BG running high after her steroid injection from ortho - hopefully this is a one time thing.   Update A1c to assess control on new regimen.   On losartan for nephroprotection and atorvastatin for ASCVD risk reduction

## 2024-06-13 NOTE — TELEPHONE ENCOUNTER
MRI bx left breast x 2 for extent of dz      Mariana can you help get scheduled Week of June 24 or after.    Thanks

## 2024-06-13 NOTE — ASSESSMENT & PLAN NOTE
Will update DEXA. She is planning to start aromatase inhibitor for breast cancer which will worsen bone density   She takes Vit D and calcium. Will make sure vit D level adequate

## 2024-06-14 ENCOUNTER — PREP FOR PROCEDURE (OUTPATIENT)
Age: 69
End: 2024-06-14

## 2024-06-14 ENCOUNTER — CLINICAL DOCUMENTATION (OUTPATIENT)
Age: 69
End: 2024-06-14

## 2024-06-14 DIAGNOSIS — Z17.0 MALIGNANT NEOPLASM OF LEFT BREAST IN FEMALE, ESTROGEN RECEPTOR POSITIVE, UNSPECIFIED SITE OF BREAST (HCC): Primary | ICD-10-CM

## 2024-06-14 DIAGNOSIS — C50.912 MALIGNANT NEOPLASM OF LEFT BREAST IN FEMALE, ESTROGEN RECEPTOR POSITIVE, UNSPECIFIED SITE OF BREAST (HCC): Primary | ICD-10-CM

## 2024-06-16 ENCOUNTER — PATIENT MESSAGE (OUTPATIENT)
Age: 69
End: 2024-06-16

## 2024-06-16 NOTE — RESULT ENCOUNTER NOTE
Will send MCM  - DM stable, A1c 7.1  - thyroid hormone levels are normal off levothyroxine  - Vitamin D is normal, cont OTC D3 2000 IU daily

## 2024-06-17 RX ORDER — ATORVASTATIN CALCIUM 10 MG/1
10 TABLET, FILM COATED ORAL DAILY
Qty: 90 TABLET | Refills: 1 | Status: SHIPPED | OUTPATIENT
Start: 2024-06-17

## 2024-06-17 NOTE — TELEPHONE ENCOUNTER
From: Nory Noe  Sent: 6/16/2024 5:29 PM EDT  To: Harsh Rutledge  Clinical Staff  Subject: lab results    Thanks. I forgot to remind you I needed a refill  On the [employer]. It would be new for you

## 2024-06-26 ENCOUNTER — HOSPITAL ENCOUNTER (OUTPATIENT)
Age: 69
Discharge: HOME | End: 2024-06-29
Payer: MEDICARE

## 2024-06-26 DIAGNOSIS — Z78.0 MENOPAUSE: ICD-10-CM

## 2024-06-26 PROCEDURE — 77080 DXA BONE DENSITY AXIAL: CPT

## 2024-06-26 RX ORDER — LIDOCAINE HYDROCHLORIDE 10 MG/ML
30 INJECTION, SOLUTION EPIDURAL; INFILTRATION; INTRACAUDAL; PERINEURAL ONCE
Status: COMPLETED | OUTPATIENT
Start: 2024-06-27 | End: 2024-06-27

## 2024-06-26 RX ORDER — LIDOCAINE HYDROCHLORIDE AND EPINEPHRINE 10; 10 MG/ML; UG/ML
30 INJECTION, SOLUTION INFILTRATION; PERINEURAL ONCE
Status: COMPLETED | OUTPATIENT
Start: 2024-06-27 | End: 2024-06-27

## 2024-06-27 ENCOUNTER — HOSPITAL ENCOUNTER (OUTPATIENT)
Facility: HOSPITAL | Age: 69
Discharge: HOME | End: 2024-06-30
Attending: SURGERY

## 2024-06-27 ENCOUNTER — HOSPITAL ENCOUNTER (OUTPATIENT)
Facility: HOSPITAL | Age: 69
End: 2024-06-27
Attending: SURGERY
Payer: MEDICARE

## 2024-06-27 DIAGNOSIS — R92.8 ABNORMAL MRI, BREAST: ICD-10-CM

## 2024-06-27 PROCEDURE — 88305 TISSUE EXAM BY PATHOLOGIST: CPT

## 2024-06-27 PROCEDURE — 19085 BX BREAST 1ST LESION MR IMAG: CPT

## 2024-06-27 PROCEDURE — 77065 DX MAMMO INCL CAD UNI: CPT

## 2024-06-27 PROCEDURE — 88341 IMHCHEM/IMCYTCHM EA ADD ANTB: CPT

## 2024-06-27 PROCEDURE — A9579 GAD-BASE MR CONTRAST NOS,1ML: HCPCS | Performed by: SURGERY

## 2024-06-27 PROCEDURE — 2580000003 HC RX 258: Performed by: SURGERY

## 2024-06-27 PROCEDURE — 2500000003 HC RX 250 WO HCPCS: Performed by: SURGERY

## 2024-06-27 PROCEDURE — 6360000004 HC RX CONTRAST MEDICATION: Performed by: SURGERY

## 2024-06-27 PROCEDURE — 88342 IMHCHEM/IMCYTCHM 1ST ANTB: CPT

## 2024-06-27 PROCEDURE — 88360 TUMOR IMMUNOHISTOCHEM/MANUAL: CPT

## 2024-06-27 RX ORDER — 0.9 % SODIUM CHLORIDE 0.9 %
100 INTRAVENOUS SOLUTION INTRAVENOUS ONCE
Status: COMPLETED | OUTPATIENT
Start: 2024-06-27 | End: 2024-06-27

## 2024-06-27 RX ADMIN — LIDOCAINE HYDROCHLORIDE 20 ML: 10 INJECTION, SOLUTION EPIDURAL; INFILTRATION; INTRACAUDAL; PERINEURAL at 08:15

## 2024-06-27 RX ADMIN — GADOTERIDOL 20 ML: 279.3 INJECTION, SOLUTION INTRAVENOUS at 08:31

## 2024-06-27 RX ADMIN — SODIUM CHLORIDE 100 ML: 9 INJECTION, SOLUTION INTRAVENOUS at 08:32

## 2024-06-27 RX ADMIN — LIDOCAINE HYDROCHLORIDE,EPINEPHRINE BITARTRATE 20 ML: 10; .01 INJECTION, SOLUTION INFILTRATION; PERINEURAL at 08:15

## 2024-06-27 NOTE — PROGRESS NOTES
Patient tolerated left breast biopsy well with small amount of bleeding. Biopsy site was bandaged using steri strips, gauze and tegaderm, ice pack placed on site. Discharge instructions were reviewed with the patient who expresses understanding. She was provided with a written copy as well. Advised patient that results should be available by Monday, and that she will receive a phone call with these results. Encouraged her to call with any questions or concerns.

## 2024-07-02 ENCOUNTER — TELEPHONE (OUTPATIENT)
Age: 69
End: 2024-07-02

## 2024-07-02 NOTE — TELEPHONE ENCOUNTER
Called and spoke to patient.  Reviewed path from MRI biopsy - DCIS.  Aware that Dr. Alatorre is out of the office this week and will follow-up with her next week to discuss surgical management.        Breast, left, biopsy:        Ductal carcinoma in situ.  See comment.       Architectural Patterns: Solid       Nuclear Grade: Grade II       Necrosis: Not identified       Microcalcifications: Not identified.

## 2024-07-10 ENCOUNTER — TELEPHONE (OUTPATIENT)
Age: 69
End: 2024-07-10

## 2024-07-11 ENCOUNTER — CLINICAL DOCUMENTATION (OUTPATIENT)
Age: 69
End: 2024-07-11

## 2024-07-11 NOTE — PROGRESS NOTES
Received ppw from [facility] for this pt. Forms have been completed and reports are attached to forms for faxing. Will scanned completed forms and confirmation fax in pt's chart.

## 2024-07-15 PROBLEM — D05.12 DUCTAL CARCINOMA IN SITU (DCIS) OF LEFT BREAST: Status: ACTIVE | Noted: 2024-07-15

## 2024-07-15 NOTE — PROGRESS NOTES
HISTORY OF PRESENT ILLNESS  Nory Noe is a 69 y.o. female.    HPI   ESTABLISHED patient here for follow up visit pertaining to left breast cancer. Pt states she has some tenderness in left nipple area. Here for US to determine sx option.     5/14/2024: LEFT breast biopsy, 5-6:00, PATH: IDC, ER+(100%)/NC+(100%)/HER-2-, Ki-67 7%, favor histologic grade 2, 7.0mm largest invasive tumor focus in 1 tissue core, DCIS, ER+(100%)/NC+(100%), nuclear grade 1-2, solid and cribriform types.    - MammaPrint: Low Risk, Luminal A-Type, +0.242    6/27/24: LEFT breast, bx PATH: DCIS, nuclear gr 2, solid patterns. ER+(100%)/NC+(100%).     Past Medical History:   Diagnosis Date    Acquired absence of both cervix and uterus 4/5/2012    Breast cancer (HCC)     Diabetes (HCC)     Hx of bone density study 11/25/2015    Osteopenia    Hx of mammogram 2/25/13    Negative    Hypercholesterolemia     Obesity     Long time    Osteoarthritis Several years    Osteopenia 2015,11/11    Mild    Pap smear for cervical cancer screening 5/8/06    Normal Pap    Symptomatic menopausal or female climacteric states 4/5/2012    Type 2 diabetes mellitus without complication (HCC) 8 years?       Past Surgical History:   Procedure Laterality Date    BREAST BIOPSY  years ago     Neg    HYSTERECTOMY, TOTAL ABDOMINAL (CERVIX REMOVED)  10/2003    fibroids    MRI BREAST BX USING DEVICE LEFT Left 6/27/2024    MRI BREAST BX USING DEVICE LEFT 6/27/2024 [facility] RAD MRI    US BREAST BIOPSY W LOC DEVICE 1ST LESION LEFT Left 5/14/2024    US BREAST BIOPSY W LOC DEVICE 1ST LESION LEFT 5/14/2024 [facility] RAD US       Social History     Socioeconomic History    Marital status:      Spouse name: Not on file    Number of children: Not on file    Years of education: Not on file    Highest education level: Not on file   Occupational History    Not on file   Tobacco Use    Smoking status: Never    Smokeless tobacco: Never   Vaping Use    Vaping Use: Never used   Substance and

## 2024-07-16 DIAGNOSIS — D05.12 DUCTAL CARCINOMA IN SITU OF LEFT BREAST: Primary | ICD-10-CM

## 2024-07-17 ENCOUNTER — TELEPHONE (OUTPATIENT)
Age: 69
End: 2024-07-17

## 2024-07-17 ENCOUNTER — OFFICE VISIT (OUTPATIENT)
Age: 69
End: 2024-07-17
Payer: MEDICARE

## 2024-07-17 VITALS — BODY MASS INDEX: 35.79 KG/M2 | WEIGHT: 202 LBS | HEIGHT: 63 IN

## 2024-07-17 DIAGNOSIS — D05.12 DUCTAL CARCINOMA IN SITU (DCIS) OF LEFT BREAST: Primary | ICD-10-CM

## 2024-07-17 PROCEDURE — G8399 PT W/DXA RESULTS DOCUMENT: HCPCS | Performed by: SURGERY

## 2024-07-17 PROCEDURE — 99213 OFFICE O/P EST LOW 20 MIN: CPT | Performed by: SURGERY

## 2024-07-17 PROCEDURE — 1036F TOBACCO NON-USER: CPT | Performed by: SURGERY

## 2024-07-17 PROCEDURE — 76642 ULTRASOUND BREAST LIMITED: CPT | Performed by: SURGERY

## 2024-07-17 PROCEDURE — 3017F COLORECTAL CA SCREEN DOC REV: CPT | Performed by: SURGERY

## 2024-07-17 PROCEDURE — G8427 DOCREV CUR MEDS BY ELIG CLIN: HCPCS | Performed by: SURGERY

## 2024-07-17 PROCEDURE — 1123F ACP DISCUSS/DSCN MKR DOCD: CPT | Performed by: SURGERY

## 2024-07-17 PROCEDURE — G8417 CALC BMI ABV UP PARAM F/U: HCPCS | Performed by: SURGERY

## 2024-07-17 PROCEDURE — 1090F PRES/ABSN URINE INCON ASSESS: CPT | Performed by: SURGERY

## 2024-07-17 NOTE — PROGRESS NOTES
HISTORY OF PRESENT ILLNESS  Nory Noe is a 69 y.o. female     HPI ESTABLISHED patient here for follow up visit pertaining to left breast cancer. Pt states she has some tenderness in left nipple area. Here for US to determine sx option.     5/14/2024: LEFT breast biopsy, 5-6:00, PATH: IDC, ER+(100%)/ME+(100%)/HER-2-, Ki-67 7%, favor histologic grade 2, 7.0mm largest invasive tumor focus in 1 tissue core, DCIS, ER+(100%)/ME+(100%), nuclear grade 1-2, solid and cribriform types.               - MammaPrint: Low Risk, Luminal A-Type, +0.242     6/27/24: LEFT breast, bx PATH: DCIS, nuclear gr 2, solid patterns. ER+(100%)/ME+(100%).         Review of Systems      Physical Exam       ASSESSMENT and PLAN  {Assessment and Plan Chronic Disease:[id number]}

## 2024-07-22 ENCOUNTER — HOSPITAL ENCOUNTER (OUTPATIENT)
Facility: HOSPITAL | Age: 69
Discharge: HOME | End: 2024-07-25
Payer: MEDICARE

## 2024-07-22 VITALS
DIASTOLIC BLOOD PRESSURE: 80 MMHG | TEMPERATURE: 98.1 F | WEIGHT: 202.6 LBS | BODY MASS INDEX: 35.9 KG/M2 | HEIGHT: 63 IN | SYSTOLIC BLOOD PRESSURE: 130 MMHG | HEART RATE: 79 BPM

## 2024-07-22 LAB
ANION GAP SERPL CALC-SCNC: 8 MMOL/L (ref 5–15)
BASOPHILS # BLD: 0.1 K/UL (ref 0–0.1)
BASOPHILS NFR BLD: 1 % (ref 0–1)
BUN SERPL-MCNC: 10 MG/DL (ref 6–20)
BUN/CREAT SERPL: 15 (ref 12–20)
CALCIUM SERPL-MCNC: 9.7 MG/DL (ref 8.5–10.1)
CHLORIDE SERPL-SCNC: 104 MMOL/L (ref 97–108)
CO2 SERPL-SCNC: 27 MMOL/L (ref 21–32)
CREAT SERPL-MCNC: 0.65 MG/DL (ref 0.55–1.02)
DIFFERENTIAL METHOD BLD: NORMAL
EKG ATRIAL RATE: 81 BPM
EKG DIAGNOSIS: NORMAL
EKG P AXIS: 18 DEGREES
EKG P-R INTERVAL: 196 MS
EKG Q-T INTERVAL: 380 MS
EKG QRS DURATION: 74 MS
EKG QTC CALCULATION (BAZETT): 441 MS
EKG R AXIS: 1 DEGREES
EKG T AXIS: 46 DEGREES
EKG VENTRICULAR RATE: 81 BPM
EOSINOPHIL # BLD: 0.2 K/UL (ref 0–0.4)
EOSINOPHIL NFR BLD: 3 % (ref 0–7)
ERYTHROCYTE [DISTWIDTH] IN BLOOD BY AUTOMATED COUNT: 12.8 % (ref 11.5–14.5)
EST. AVERAGE GLUCOSE BLD GHB EST-MCNC: 146 MG/DL
GLUCOSE SERPL-MCNC: 141 MG/DL (ref 65–100)
HBA1C MFR BLD: 6.7 % (ref 4–5.6)
HCT VFR BLD AUTO: 38.7 % (ref 35–47)
HGB BLD-MCNC: 13.1 G/DL (ref 11.5–16)
IMM GRANULOCYTES # BLD AUTO: 0 K/UL (ref 0–0.04)
IMM GRANULOCYTES NFR BLD AUTO: 0 % (ref 0–0.5)
LYMPHOCYTES # BLD: 1.9 K/UL (ref 0.8–3.5)
LYMPHOCYTES NFR BLD: 29 % (ref 12–49)
MCH RBC QN AUTO: 28.4 PG (ref 26–34)
MCHC RBC AUTO-ENTMCNC: 33.9 G/DL (ref 30–36.5)
MCV RBC AUTO: 83.8 FL (ref 80–99)
MONOCYTES # BLD: 0.6 K/UL (ref 0–1)
MONOCYTES NFR BLD: 9 % (ref 5–13)
NEUTS SEG # BLD: 3.8 K/UL (ref 1.8–8)
NEUTS SEG NFR BLD: 58 % (ref 32–75)
NRBC # BLD: 0 K/UL (ref 0–0.01)
NRBC BLD-RTO: 0 PER 100 WBC
PLATELET # BLD AUTO: 188 K/UL (ref 150–400)
PMV BLD AUTO: 10.1 FL (ref 8.9–12.9)
POTASSIUM SERPL-SCNC: 4 MMOL/L (ref 3.5–5.1)
RBC # BLD AUTO: 4.62 M/UL (ref 3.8–5.2)
SODIUM SERPL-SCNC: 139 MMOL/L (ref 136–145)
WBC # BLD AUTO: 6.6 K/UL (ref 3.6–11)

## 2024-07-22 PROCEDURE — 85025 COMPLETE CBC W/AUTO DIFF WBC: CPT

## 2024-07-22 PROCEDURE — 36415 COLL VENOUS BLD VENIPUNCTURE: CPT

## 2024-07-22 PROCEDURE — 83036 HEMOGLOBIN GLYCOSYLATED A1C: CPT

## 2024-07-22 PROCEDURE — 80048 BASIC METABOLIC PNL TOTAL CA: CPT

## 2024-07-22 PROCEDURE — 93010 ELECTROCARDIOGRAM REPORT: CPT | Performed by: SPECIALIST

## 2024-07-22 PROCEDURE — 93005 ELECTROCARDIOGRAM TRACING: CPT | Performed by: SURGERY

## 2024-07-22 RX ORDER — ASCORBIC ACID 500 MG
500 TABLET ORAL DAILY
COMMUNITY

## 2024-07-22 NOTE — PERIOP NOTE
PT STATES SHE IS WAITING TO HEAR BACK WHEN SHE WILL HAVE AN APPOINTMENT WITH A PLASTIC SURGEON TO HAVE HER RIGHT BREAST REDUCTION DONE AT THE SAME TIME AS HER LEFT BREAST LUMPECTOMY. CONSENT TO BE SIGNED DOS.

## 2024-07-22 NOTE — FLOWSHEET NOTE
PT STATES SHE DOES NOT WANT A SLEEP STUDY AT THIS TIME, SHE STATES SHE WILL SPEAK WITH HER PRIMARY CARE PROVIDER AFTER SHE RECOVERS FROM SURGERY.

## 2024-07-22 NOTE — PERIOP NOTE
[address] [address]   MAIN OR                                  [phone]    MAIN PRE OP             [phone]                                                                                AMBULATORY PRE OP          [phone]  PRE-ADMISSION TESTING    [phone]     Surgery Date:  7/30/24       Is surgery arrival time given by surgeon?  YES  NO    If “NO”, [facility] staff will call you between 4 and 7pm the day before your surgery with your arrival time. (If your surgery is on a Monday, we will call you the Friday before.)    Call [phone] after 7pm Monday-Friday if you did not receive this call.    INSTRUCTIONS BEFORE YOUR SURGERY   When You  Arrive Arrive at [facility] Patient Access on 1st floor the day of your surgery.   Medications to   TAKE   Morning of Surgery MEDICATIONS TO TAKE THE MORNING OF SURGERY WITH A SIP OF WATER: ATORVASTATIN    You may take these medications, IF NEEDED, the morning of surgery: FLONASE  Ask your surgeon/prescribing doctor for instructions on taking or stopping these medications prior to surgery: NONE   Medications to STOP  before surgery Non-Steroidal anti-inflammatory Drugs (NSAID's): for example, Diclofenac (Voltaren), Ibuprofen (Advil, Motrin), Naproxen (Aleve) 3 days  STOP all herbal supplements and vitamins(unless prescribed by your doctor), and fish oil for 7 days  Other: DO NOT TAKE METFORMIN FOR 24 HOURS PRIOR TO SURGERY, DO NOT TAKE RYBELSUS FOR 3 DAYS PRIOR TO SURGERY, DO NOT TAKE LOSARTAN DAY OF SURGERY   (Pain medications not listed above, including Tylenol may be taken up until 4 hours prior to arrival time)   Blood  Thinners If you take Aspirin, Plavix, Coumadin, or any blood-thinning or anti-blood clot medicine, talk to the doctor who prescribed the medications for pre-operative instructions.  If you take aspirin or aspirin containing products for pain, stop 7 days prior to surgery

## 2024-07-23 ENCOUNTER — TELEPHONE (OUTPATIENT)
Facility: HOSPITAL | Age: 69
End: 2024-07-23

## 2024-07-23 NOTE — TELEPHONE ENCOUNTER
[facility]  Breast Navigator Encounter    Name:    Nory Noe  Age:    69 y.o.  Diagnosis:   LEFT breast cancer    Called patient to clarify surgical plan.  She is still scheduled for lumpectomy on 7/30, but has an appt to see Dr. Carter on 8/7 to discuss reduction lumpectomy.  I spoke to Dr. Alatorre and called her.    I told her that surgery for 7/30 should be cancelled so that the lumpectomy and reduction can be done at the same time, hopefully in early September.  She did not realize that the lumpectomy should not be done now with the reduction at a later date.  She was initially seen in late May, so it has already been quite a long time getting to surgery.  I told her that Dr. Alatorre can put her on an estrogen blocker until surgery if that makes her feel better.      Discussed that this is probably non-invasive cancer, so does not change her staging at all, just changes the surgery because a larger amount of tissue will have to be removed.  She asked what happens if there are not \"clear margins.\"  I explained that hopefully this would not be the case with clips marking the edge of the enhancement and the mass, but if so a re-excision can be performed.  Asked if she will now need to have radiation, and I told her this might be dependent on the pathology results, but I was unsure.  I answered the questions to the best of my knowledge, but because she has some questions that I am not able to answer, I offered for Dr. Alatorre to call her to answer these questions.  I told her to write down her questions so that she can have them ready when he calls.      She was very appreciative of the call and will await his call today or tomorrow.                              Tracie Villa RN, BSN, CBCN  Oncology Breast Navigator     [facility]  [address] [place]  [address]  W: [phone]  F: [phone]  [email or web address]  Good Help to Those in Need®

## 2024-07-24 ENCOUNTER — TELEPHONE (OUTPATIENT)
Age: 69
End: 2024-07-24

## 2024-08-05 ENCOUNTER — PATIENT MESSAGE (OUTPATIENT)
Age: 69
End: 2024-08-05

## 2024-08-06 NOTE — TELEPHONE ENCOUNTER
PCP must submit referral.  Submitted via [employer].  Emma horvath OON.  Notified pt via [facility].  Reference number [id number]

## 2024-08-08 ENCOUNTER — TELEPHONE (OUTPATIENT)
Age: 69
End: 2024-08-08

## 2024-08-08 NOTE — TELEPHONE ENCOUNTER
----- Message from Maureen Sashaliz Emelina sent at 8/7/2024  3:14 PM EDT -----  Regarding: [facility] Appointment Request  [facility] Appointment Request    Patient needs appointment for [facility] Appointment Type: Annual Visit.    Patient Requested Dates(s): AROUND OCTOBER  Patient Requested Time: MORNING/ ANYTIME  Provider Name: Jolie Loredo MD  PCP - General      Reason for Appointment Request: Established Patient - No appointments available during search      Patient wanted to move the date of her appointment on Sept 12 for her ANNUAL WELL VISIT, her PCP advised her that it is too early to come, since she has a surgery sched on sept 5.   --------------------------------------------------------------------------------------------------------------------------    Relationship to Patient: Self     Call Back Information: OK to respond with electronic message via [employer] portal (only for patients who have registered [employer] account)  / call back/ voicemail  Preferred Call Back Number: Phone [phone]

## 2024-08-15 ENCOUNTER — CLINICAL DOCUMENTATION (OUTPATIENT)
Age: 69
End: 2024-08-15

## 2024-08-15 ENCOUNTER — PREP FOR PROCEDURE (OUTPATIENT)
Age: 69
End: 2024-08-15

## 2024-08-15 DIAGNOSIS — Z17.0 MALIGNANT NEOPLASM OF LEFT BREAST IN FEMALE, ESTROGEN RECEPTOR POSITIVE, UNSPECIFIED SITE OF BREAST (HCC): Primary | ICD-10-CM

## 2024-08-15 DIAGNOSIS — C50.912 MALIGNANT NEOPLASM OF LEFT BREAST IN FEMALE, ESTROGEN RECEPTOR POSITIVE, UNSPECIFIED SITE OF BREAST (HCC): Primary | ICD-10-CM

## 2024-08-15 NOTE — PROGRESS NOTES
Received a order request for patient from [facility]  for recently note and signature. All documents were faxed.

## 2024-08-30 ENCOUNTER — TELEPHONE (OUTPATIENT)
Age: 69
End: 2024-08-30

## 2024-08-30 NOTE — TELEPHONE ENCOUNTER
I received a call from Jessica in [facility], asking about this patient's snbx injection.  She did not see it scheduled.  I called and spoke with the nuclear medicine department and they have schedule the injection for 8:30 am on 09/05/24 .  I notified Jessica and let her know that this had been done,  We will be talking to the patient about her surgery and give her this information at that time.

## 2024-09-04 ENCOUNTER — ANESTHESIA EVENT (OUTPATIENT)
Facility: HOSPITAL | Age: 69
End: 2024-09-04
Payer: MEDICARE

## 2024-09-04 RX ORDER — SODIUM CHLORIDE, SODIUM LACTATE, POTASSIUM CHLORIDE, CALCIUM CHLORIDE 600; 310; 30; 20 MG/100ML; MG/100ML; MG/100ML; MG/100ML
INJECTION, SOLUTION INTRAVENOUS CONTINUOUS
Status: CANCELLED | OUTPATIENT
Start: 2024-09-04

## 2024-09-04 RX ORDER — ACETAMINOPHEN 500 MG
1000 TABLET ORAL ONCE
Status: CANCELLED | OUTPATIENT
Start: 2024-09-04 | End: 2024-09-04

## 2024-09-04 RX ORDER — SODIUM CHLORIDE 0.9 % (FLUSH) 0.9 %
5-40 SYRINGE (ML) INJECTION EVERY 12 HOURS SCHEDULED
Status: CANCELLED | OUTPATIENT
Start: 2024-09-04

## 2024-09-04 RX ORDER — SODIUM CHLORIDE 9 MG/ML
INJECTION, SOLUTION INTRAVENOUS PRN
Status: CANCELLED | OUTPATIENT
Start: 2024-09-04

## 2024-09-04 RX ORDER — SODIUM CHLORIDE 0.9 % (FLUSH) 0.9 %
5-40 SYRINGE (ML) INJECTION PRN
Status: CANCELLED | OUTPATIENT
Start: 2024-09-04

## 2024-09-04 RX ORDER — LIDOCAINE HYDROCHLORIDE 10 MG/ML
1 INJECTION, SOLUTION INFILTRATION; PERINEURAL
Status: CANCELLED | OUTPATIENT
Start: 2024-09-04 | End: 2024-09-05

## 2024-09-04 RX ORDER — FENTANYL CITRATE 50 UG/ML
100 INJECTION, SOLUTION INTRAMUSCULAR; INTRAVENOUS
Status: CANCELLED | OUTPATIENT
Start: 2024-09-04 | End: 2024-09-05

## 2024-09-04 RX ORDER — MIDAZOLAM HYDROCHLORIDE 2 MG/2ML
2 INJECTION, SOLUTION INTRAMUSCULAR; INTRAVENOUS
Status: CANCELLED | OUTPATIENT
Start: 2024-09-04 | End: 2024-09-05

## 2024-09-05 ENCOUNTER — ANESTHESIA (OUTPATIENT)
Facility: HOSPITAL | Age: 69
End: 2024-09-05
Payer: MEDICARE

## 2024-09-05 ENCOUNTER — APPOINTMENT (OUTPATIENT)
Facility: HOSPITAL | Age: 69
End: 2024-09-05
Attending: SURGERY
Payer: MEDICARE

## 2024-09-05 ENCOUNTER — HOSPITAL ENCOUNTER (OUTPATIENT)
Facility: HOSPITAL | Age: 69
Setting detail: OUTPATIENT SURGERY
Discharge: HOME | End: 2024-09-05
Attending: SURGERY | Admitting: SURGERY
Payer: MEDICARE

## 2024-09-05 VITALS
HEIGHT: 63 IN | DIASTOLIC BLOOD PRESSURE: 71 MMHG | HEART RATE: 76 BPM | SYSTOLIC BLOOD PRESSURE: 121 MMHG | BODY MASS INDEX: 35.61 KG/M2 | TEMPERATURE: 98.2 F | RESPIRATION RATE: 15 BRPM | WEIGHT: 201 LBS | OXYGEN SATURATION: 90 %

## 2024-09-05 DIAGNOSIS — D05.12 DUCTAL CARCINOMA IN SITU OF LEFT BREAST: ICD-10-CM

## 2024-09-05 DIAGNOSIS — C50.912 INVASIVE DUCTAL CARCINOMA OF BREAST, FEMALE, LEFT (HCC): Primary | ICD-10-CM

## 2024-09-05 LAB
GLUCOSE BLD STRIP.AUTO-MCNC: 106 MG/DL (ref 65–117)
GLUCOSE BLD STRIP.AUTO-MCNC: 121 MG/DL (ref 65–117)
SERVICE CMNT-IMP: ABNORMAL
SERVICE CMNT-IMP: NORMAL

## 2024-09-05 PROCEDURE — 3700000000 HC ANESTHESIA ATTENDED CARE: Performed by: SURGERY

## 2024-09-05 PROCEDURE — 6360000002 HC RX W HCPCS: Performed by: SURGERY

## 2024-09-05 PROCEDURE — 2720000010 HC SURG SUPPLY STERILE: Performed by: SURGERY

## 2024-09-05 PROCEDURE — 78195 LYMPH SYSTEM IMAGING: CPT

## 2024-09-05 PROCEDURE — 3430000000 HC RX DIAGNOSTIC RADIOPHARMACEUTICAL: Performed by: SURGERY

## 2024-09-05 PROCEDURE — 82962 GLUCOSE BLOOD TEST: CPT

## 2024-09-05 PROCEDURE — 3700000001 HC ADD 15 MINUTES (ANESTHESIA): Performed by: SURGERY

## 2024-09-05 PROCEDURE — 88307 TISSUE EXAM BY PATHOLOGIST: CPT

## 2024-09-05 PROCEDURE — 2500000003 HC RX 250 WO HCPCS

## 2024-09-05 PROCEDURE — 2580000003 HC RX 258: Performed by: SURGERY

## 2024-09-05 PROCEDURE — 88341 IMHCHEM/IMCYTCHM EA ADD ANTB: CPT

## 2024-09-05 PROCEDURE — 7100000000 HC PACU RECOVERY - FIRST 15 MIN: Performed by: SURGERY

## 2024-09-05 PROCEDURE — 88305 TISSUE EXAM BY PATHOLOGIST: CPT

## 2024-09-05 PROCEDURE — A9520 TC99 TILMANOCEPT DIAG 0.5MCI: HCPCS | Performed by: SURGERY

## 2024-09-05 PROCEDURE — 2709999900 HC NON-CHARGEABLE SUPPLY: Performed by: SURGERY

## 2024-09-05 PROCEDURE — 7100000001 HC PACU RECOVERY - ADDTL 15 MIN: Performed by: SURGERY

## 2024-09-05 PROCEDURE — 2580000003 HC RX 258

## 2024-09-05 PROCEDURE — C9290 INJ, BUPIVACAINE LIPOSOME: HCPCS | Performed by: SURGERY

## 2024-09-05 PROCEDURE — 6360000002 HC RX W HCPCS

## 2024-09-05 PROCEDURE — 88342 IMHCHEM/IMCYTCHM 1ST ANTB: CPT

## 2024-09-05 PROCEDURE — 3600000013 HC SURGERY LEVEL 3 ADDTL 15MIN: Performed by: SURGERY

## 2024-09-05 PROCEDURE — 3600000003 HC SURGERY LEVEL 3 BASE: Performed by: SURGERY

## 2024-09-05 RX ORDER — GLYCOPYRROLATE 0.2 MG/ML
INJECTION, SOLUTION INTRAMUSCULAR; INTRAVENOUS PRN
Status: DISCONTINUED | OUTPATIENT
Start: 2024-09-05 | End: 2024-09-05 | Stop reason: SDUPTHER

## 2024-09-05 RX ORDER — FENTANYL CITRATE 50 UG/ML
INJECTION, SOLUTION INTRAMUSCULAR; INTRAVENOUS PRN
Status: DISCONTINUED | OUTPATIENT
Start: 2024-09-05 | End: 2024-09-05 | Stop reason: SDUPTHER

## 2024-09-05 RX ORDER — AMOXICILLIN 250 MG
2 CAPSULE ORAL DAILY
Qty: 60 TABLET | Refills: 0 | Status: SHIPPED | OUTPATIENT
Start: 2024-09-05

## 2024-09-05 RX ORDER — OXYCODONE HYDROCHLORIDE 5 MG/1
5 TABLET ORAL EVERY 6 HOURS PRN
Qty: 20 TABLET | Refills: 0 | Status: SHIPPED | OUTPATIENT
Start: 2024-09-05 | End: 2024-09-10

## 2024-09-05 RX ORDER — SODIUM CHLORIDE 0.9 % (FLUSH) 0.9 %
5-40 SYRINGE (ML) INJECTION EVERY 12 HOURS SCHEDULED
Status: DISCONTINUED | OUTPATIENT
Start: 2024-09-05 | End: 2024-09-05 | Stop reason: HOSPADM

## 2024-09-05 RX ORDER — SODIUM CHLORIDE, SODIUM LACTATE, POTASSIUM CHLORIDE, CALCIUM CHLORIDE 600; 310; 30; 20 MG/100ML; MG/100ML; MG/100ML; MG/100ML
INJECTION, SOLUTION INTRAVENOUS CONTINUOUS PRN
Status: DISCONTINUED | OUTPATIENT
Start: 2024-09-05 | End: 2024-09-05 | Stop reason: SDUPTHER

## 2024-09-05 RX ORDER — HYDROMORPHONE HYDROCHLORIDE 2 MG/ML
INJECTION, SOLUTION INTRAMUSCULAR; INTRAVENOUS; SUBCUTANEOUS PRN
Status: DISCONTINUED | OUTPATIENT
Start: 2024-09-05 | End: 2024-09-05 | Stop reason: SDUPTHER

## 2024-09-05 RX ORDER — PROPOFOL 10 MG/ML
INJECTION, EMULSION INTRAVENOUS PRN
Status: DISCONTINUED | OUTPATIENT
Start: 2024-09-05 | End: 2024-09-05 | Stop reason: SDUPTHER

## 2024-09-05 RX ORDER — FENTANYL CITRATE 50 UG/ML
25 INJECTION, SOLUTION INTRAMUSCULAR; INTRAVENOUS EVERY 5 MIN PRN
Status: DISCONTINUED | OUTPATIENT
Start: 2024-09-05 | End: 2024-09-05 | Stop reason: HOSPADM

## 2024-09-05 RX ORDER — ONDANSETRON 2 MG/ML
4 INJECTION INTRAMUSCULAR; INTRAVENOUS
Status: DISCONTINUED | OUTPATIENT
Start: 2024-09-05 | End: 2024-09-05 | Stop reason: HOSPADM

## 2024-09-05 RX ORDER — NEOSTIGMINE METHYLSULFATE 1 MG/ML
INJECTION, SOLUTION INTRAVENOUS PRN
Status: DISCONTINUED | OUTPATIENT
Start: 2024-09-05 | End: 2024-09-05 | Stop reason: SDUPTHER

## 2024-09-05 RX ORDER — PROCHLORPERAZINE EDISYLATE 5 MG/ML
5 INJECTION INTRAMUSCULAR; INTRAVENOUS
Status: DISCONTINUED | OUTPATIENT
Start: 2024-09-05 | End: 2024-09-05 | Stop reason: HOSPADM

## 2024-09-05 RX ORDER — MIDAZOLAM HYDROCHLORIDE 1 MG/ML
INJECTION INTRAMUSCULAR; INTRAVENOUS PRN
Status: DISCONTINUED | OUTPATIENT
Start: 2024-09-05 | End: 2024-09-05 | Stop reason: SDUPTHER

## 2024-09-05 RX ORDER — ONDANSETRON 4 MG/1
4 TABLET, ORALLY DISINTEGRATING ORAL 3 TIMES DAILY PRN
Qty: 21 TABLET | Refills: 0 | Status: SHIPPED | OUTPATIENT
Start: 2024-09-05

## 2024-09-05 RX ORDER — HYDRALAZINE HYDROCHLORIDE 20 MG/ML
10 INJECTION INTRAMUSCULAR; INTRAVENOUS ONCE
Status: DISCONTINUED | OUTPATIENT
Start: 2024-09-05 | End: 2024-09-05

## 2024-09-05 RX ORDER — LIDOCAINE HYDROCHLORIDE 20 MG/ML
INJECTION, SOLUTION EPIDURAL; INFILTRATION; INTRACAUDAL; PERINEURAL PRN
Status: DISCONTINUED | OUTPATIENT
Start: 2024-09-05 | End: 2024-09-05 | Stop reason: SDUPTHER

## 2024-09-05 RX ORDER — ONDANSETRON 2 MG/ML
INJECTION INTRAMUSCULAR; INTRAVENOUS PRN
Status: DISCONTINUED | OUTPATIENT
Start: 2024-09-05 | End: 2024-09-05 | Stop reason: SDUPTHER

## 2024-09-05 RX ORDER — OXYCODONE HYDROCHLORIDE 5 MG/1
5 TABLET ORAL
Status: DISCONTINUED | OUTPATIENT
Start: 2024-09-05 | End: 2024-09-05 | Stop reason: HOSPADM

## 2024-09-05 RX ORDER — DOXYCYCLINE HYCLATE 100 MG
100 TABLET ORAL 2 TIMES DAILY
Qty: 14 TABLET | Refills: 0 | Status: SHIPPED | OUTPATIENT
Start: 2024-09-05 | End: 2024-09-12

## 2024-09-05 RX ORDER — SODIUM CHLORIDE 0.9 % (FLUSH) 0.9 %
5-40 SYRINGE (ML) INJECTION PRN
Status: DISCONTINUED | OUTPATIENT
Start: 2024-09-05 | End: 2024-09-05 | Stop reason: HOSPADM

## 2024-09-05 RX ORDER — HYDROMORPHONE HYDROCHLORIDE 1 MG/ML
0.5 INJECTION, SOLUTION INTRAMUSCULAR; INTRAVENOUS; SUBCUTANEOUS EVERY 5 MIN PRN
Status: DISCONTINUED | OUTPATIENT
Start: 2024-09-05 | End: 2024-09-05 | Stop reason: HOSPADM

## 2024-09-05 RX ORDER — NALOXONE HYDROCHLORIDE 0.4 MG/ML
INJECTION, SOLUTION INTRAMUSCULAR; INTRAVENOUS; SUBCUTANEOUS PRN
Status: DISCONTINUED | OUTPATIENT
Start: 2024-09-05 | End: 2024-09-05 | Stop reason: HOSPADM

## 2024-09-05 RX ORDER — SUCCINYLCHOLINE/SOD CL,ISO/PF 200MG/10ML
SYRINGE (ML) INTRAVENOUS PRN
Status: DISCONTINUED | OUTPATIENT
Start: 2024-09-05 | End: 2024-09-05 | Stop reason: SDUPTHER

## 2024-09-05 RX ORDER — ROCURONIUM BROMIDE 10 MG/ML
INJECTION, SOLUTION INTRAVENOUS PRN
Status: DISCONTINUED | OUTPATIENT
Start: 2024-09-05 | End: 2024-09-05 | Stop reason: SDUPTHER

## 2024-09-05 RX ORDER — HYDRALAZINE HYDROCHLORIDE 20 MG/ML
10 INJECTION INTRAMUSCULAR; INTRAVENOUS
Status: DISCONTINUED | OUTPATIENT
Start: 2024-09-05 | End: 2024-09-05 | Stop reason: HOSPADM

## 2024-09-05 RX ORDER — DEXAMETHASONE SODIUM PHOSPHATE 4 MG/ML
INJECTION, SOLUTION INTRA-ARTICULAR; INTRALESIONAL; INTRAMUSCULAR; INTRAVENOUS; SOFT TISSUE PRN
Status: DISCONTINUED | OUTPATIENT
Start: 2024-09-05 | End: 2024-09-05 | Stop reason: SDUPTHER

## 2024-09-05 RX ORDER — PHENYLEPHRINE HCL IN 0.9% NACL 0.4MG/10ML
SYRINGE (ML) INTRAVENOUS PRN
Status: DISCONTINUED | OUTPATIENT
Start: 2024-09-05 | End: 2024-09-05 | Stop reason: SDUPTHER

## 2024-09-05 RX ORDER — SODIUM CHLORIDE 9 MG/ML
INJECTION, SOLUTION INTRAVENOUS PRN
Status: DISCONTINUED | OUTPATIENT
Start: 2024-09-05 | End: 2024-09-05 | Stop reason: HOSPADM

## 2024-09-05 RX ORDER — DEXMEDETOMIDINE HYDROCHLORIDE 100 UG/ML
INJECTION, SOLUTION INTRAVENOUS PRN
Status: DISCONTINUED | OUTPATIENT
Start: 2024-09-05 | End: 2024-09-05 | Stop reason: SDUPTHER

## 2024-09-05 RX ADMIN — ROCURONIUM BROMIDE 5 MG: 10 INJECTION, SOLUTION INTRAVENOUS at 11:31

## 2024-09-05 RX ADMIN — FENTANYL CITRATE 50 MCG: 50 INJECTION INTRAMUSCULAR; INTRAVENOUS at 11:51

## 2024-09-05 RX ADMIN — DEXMEDETOMIDINE HYDROCHLORIDE 10 MCG: 100 INJECTION, SOLUTION, CONCENTRATE INTRAVENOUS at 11:46

## 2024-09-05 RX ADMIN — ROCURONIUM BROMIDE 45 MG: 10 INJECTION, SOLUTION INTRAVENOUS at 11:37

## 2024-09-05 RX ADMIN — LIDOCAINE HYDROCHLORIDE 100 MG: 20 INJECTION, SOLUTION EPIDURAL; INFILTRATION; INTRACAUDAL; PERINEURAL at 11:31

## 2024-09-05 RX ADMIN — TILMANOCEPT 0.5 MILLICURIE: KIT at 08:15

## 2024-09-05 RX ADMIN — Medication 80 MCG: at 12:06

## 2024-09-05 RX ADMIN — PHENYLEPHRINE HYDROCHLORIDE 40 MCG/MIN: 10 INJECTION INTRAVENOUS at 12:30

## 2024-09-05 RX ADMIN — Medication 140 MG: at 11:33

## 2024-09-05 RX ADMIN — ONDANSETRON 4 MG: 2 INJECTION INTRAMUSCULAR; INTRAVENOUS at 14:24

## 2024-09-05 RX ADMIN — Medication 80 MCG: at 13:42

## 2024-09-05 RX ADMIN — SODIUM CHLORIDE, POTASSIUM CHLORIDE, SODIUM LACTATE AND CALCIUM CHLORIDE: 600; 310; 30; 20 INJECTION, SOLUTION INTRAVENOUS at 14:25

## 2024-09-05 RX ADMIN — PROPOFOL 150 MG: 10 INJECTION, EMULSION INTRAVENOUS at 11:32

## 2024-09-05 RX ADMIN — GLYCOPYRROLATE 0.4 MG: 0.2 INJECTION, SOLUTION INTRAMUSCULAR; INTRAVENOUS at 14:41

## 2024-09-05 RX ADMIN — Medication 80 MCG: at 12:21

## 2024-09-05 RX ADMIN — DEXAMETHASONE SODIUM PHOSPHATE 4 MG: 4 INJECTION INTRA-ARTICULAR; INTRALESIONAL; INTRAMUSCULAR; INTRAVENOUS; SOFT TISSUE at 11:40

## 2024-09-05 RX ADMIN — Medication 80 MCG: at 12:27

## 2024-09-05 RX ADMIN — HYDROMORPHONE HYDROCHLORIDE 0.5 MG: 2 INJECTION, SOLUTION INTRAMUSCULAR; INTRAVENOUS; SUBCUTANEOUS at 12:34

## 2024-09-05 RX ADMIN — SODIUM CHLORIDE, POTASSIUM CHLORIDE, SODIUM LACTATE AND CALCIUM CHLORIDE: 600; 310; 30; 20 INJECTION, SOLUTION INTRAVENOUS at 11:25

## 2024-09-05 RX ADMIN — ROCURONIUM BROMIDE 10 MG: 10 INJECTION, SOLUTION INTRAVENOUS at 13:17

## 2024-09-05 RX ADMIN — FENTANYL CITRATE 50 MCG: 50 INJECTION INTRAMUSCULAR; INTRAVENOUS at 11:31

## 2024-09-05 RX ADMIN — ROCURONIUM BROMIDE 20 MG: 10 INJECTION, SOLUTION INTRAVENOUS at 12:22

## 2024-09-05 RX ADMIN — HYDROMORPHONE HYDROCHLORIDE 0.5 MG: 2 INJECTION, SOLUTION INTRAMUSCULAR; INTRAVENOUS; SUBCUTANEOUS at 13:11

## 2024-09-05 RX ADMIN — MIDAZOLAM 2 MG: 1 INJECTION INTRAMUSCULAR; INTRAVENOUS at 11:25

## 2024-09-05 RX ADMIN — WATER 2000 MG: 1 INJECTION INTRAMUSCULAR; INTRAVENOUS; SUBCUTANEOUS at 11:45

## 2024-09-05 RX ADMIN — NEOSTIGMINE METHYLSULFATE 3 MG: 1 INJECTION, SOLUTION INTRAVENOUS at 14:41

## 2024-09-05 ASSESSMENT — PAIN - FUNCTIONAL ASSESSMENT
PAIN_FUNCTIONAL_ASSESSMENT: NONE - DENIES PAIN
PAIN_FUNCTIONAL_ASSESSMENT: ADULT NONVERBAL PAIN SCALE (NPVS)

## 2024-09-05 ASSESSMENT — PAIN SCALES - GENERAL: PAINLEVEL_OUTOF10: 0

## 2024-09-05 NOTE — H&P
HISTORY OF PRESENT ILLNESS  Nory Noe is a 69 y.o. female.     HPI   ESTABLISHED patient here for follow up visit pertaining to left breast cancer. Pt states she has some tenderness in left nipple area. Here for US to determine sx option.      5/14/2024: LEFT breast biopsy, 5-6:00, PATH: IDC, ER+(100%)/OK+(100%)/HER-2-, Ki-67 7%, favor histologic grade 2, 7.0mm largest invasive tumor focus in 1 tissue core, DCIS, ER+(100%)/OK+(100%), nuclear grade 1-2, solid and cribriform types.               - MammaPrint: Low Risk, Luminal A-Type, +0.242     6/27/24: LEFT breast, bx PATH: DCIS, nuclear gr 2, solid patterns. ER+(100%)/OK+(100%).      Past Medical History        Past Medical History:   Diagnosis Date    Acquired absence of both cervix and uterus 4/5/2012    Breast cancer (HCC)      Diabetes (HCC)      Hx of bone density study 11/25/2015     Osteopenia    Hx of mammogram 2/25/13     Negative    Hypercholesterolemia      Obesity       Long time    Osteoarthritis Several years    Osteopenia 2015,11/11     Mild    Pap smear for cervical cancer screening 5/8/06     Normal Pap    Symptomatic menopausal or female climacteric states 4/5/2012    Type 2 diabetes mellitus without complication (HCC) 8 years?            Past Surgical History         Past Surgical History:   Procedure Laterality Date    BREAST BIOPSY   years ago      Neg    HYSTERECTOMY, TOTAL ABDOMINAL (CERVIX REMOVED)   10/2003     fibroids    MRI BREAST BX USING DEVICE LEFT Left 6/27/2024     MRI BREAST BX USING DEVICE LEFT 6/27/2024 [facility] RAD MRI    US BREAST BIOPSY W LOC DEVICE 1ST LESION LEFT Left 5/14/2024     US BREAST BIOPSY W LOC DEVICE 1ST LESION LEFT 5/14/2024 [facility] RAD US            Social History               Socioeconomic History    Marital status:        Spouse name: Not on file    Number of children: Not on file    Years of education: Not on file    Highest education level: Not on file   Occupational History    Not on file   Tobacco  by mouth daily 90 tablet 2    Multiple Vitamins-Minerals (CENTRUM/CERTA-BILL WITH MINERALS ORAL) solution Take 15 mLs by mouth daily        turmeric (QC TUMERIC COMPLEX) 500 MG CAPS as directed PO daily        Lancets MISC Check blood glucose once daily. DX E11.9        calcium carbonate 1500 (600 Ca) MG TABS tablet Take 1 tablet by mouth 2 times daily        Cholecalciferol 50 MCG (2000 UT) TABS Take by mouth        fluticasone (FLONASE) 50 MCG/ACT nasal spray 2 sprays by Nasal route daily        metFORMIN (GLUCOPHAGE) 500 MG tablet Take 1 tablet by mouth 2 times daily (with meals)          No current facility-administered medications on file prior to visit.            Allergies         Allergies   Allergen Reactions    Penicillins         Childhood allergy - not sure what happened            OB History                 Para        Term                AB        Living   2           SAB        IAB        Ectopic        Molar        Multiple        Live Births   2           Obstetric Comments   Menarche 11, LMP 49, # of children 2, age of 1st delivery 26, Hysterectomy/oophorectomy no/no Breast bx yes, history of breast feeding yes, BCP yes, Hormone therapy no                 Review of Systems     Physical Exam  Exam conducted with a chaperone present.   Constitutional:       Appearance: She is not diaphoretic.   HENT:      Head: Normocephalic and atraumatic.      Right Ear: External ear normal.      Left Ear: External ear normal.   Eyes:      General: No scleral icterus.        Right eye: No discharge.         Left eye: No discharge.      Pupils: Pupils are equal, round, and reactive to light.   Cardiovascular:      Rate and Rhythm: Normal rate and regular rhythm.   Pulmonary:      Effort: Pulmonary effort is normal. No respiratory distress.      Breath sounds: Normal breath sounds. No stridor.   Abdominal:      General: There is no distension.      Palpations: Abdomen is soft. There is no mass.

## 2024-09-05 NOTE — ANESTHESIA PRE PROCEDURE
Department of Anesthesiology  Preprocedure Note       Name:  Nory Noe   Age:  69 y.o.  :  1955                                          MRN:  [id number]         Date:  2024      Surgeon: Surgeon(s):  Sherman Alatorre Jr, MD Goyal, Samita S, MD    Procedure: Procedure(s):  LEFT BREAST REDUCTION LUMPECTOMY, LEFT BREAST SENTINEL NODE BIOPSY  LEFT ONCOPLASTIC REDUCTION, RIGHT REDUCTION FOR SYMMETRY    Medications prior to admission:   Prior to Admission medications    Medication Sig Start Date End Date Taking? Authorizing Provider   vitamin C (ASCORBIC ACID) 500 MG tablet Take 1 tablet by mouth daily    ProviderDeborah MD   atorvastatin (LIPITOR) 10 MG tablet Take 1 tablet by mouth daily 24   Jolie Loredo MD   Semaglutide (RYBELSUS) 7 MG TABS Take 1 tablet by mouth every morning (before breakfast) 24   Jolie Loredo MD   losartan (COZAAR) 25 MG tablet Take 1 tablet by mouth daily 3/20/24   Jolie Loredo MD   Multiple Vitamins-Minerals (CENTRUM/CERTA-BILL WITH MINERALS ORAL) solution Take 15 mLs by mouth daily    ProviderDeborah MD   turmeric (QC TUMERIC COMPLEX) 500 MG CAPS Take 1 capsule by mouth daily    Provider, Historical, MD   Lancets MISC Check blood glucose once daily. DX E11.9 21   Automatic Reconciliation, Ar   calcium carbonate 1500 (600 Ca) MG TABS tablet Take 1 tablet by mouth 2 times daily    Automatic Reconciliation, Ar   Cholecalciferol 50 MCG (2000 UT) TABS Take 1 tablet by mouth daily    Automatic Reconciliation, Ar   fluticasone (FLONASE) 50 MCG/ACT nasal spray 2 sprays by Nasal route daily  Patient not taking: Reported on 2024    Automatic Reconciliation, Ar   metFORMIN (GLUCOPHAGE) 500 MG tablet Take 1 tablet by mouth 2 times daily (with meals) 21   Automatic Reconciliation, Ar       Current medications:    Current Facility-Administered Medications   Medication Dose Route Frequency Provider Last Rate Last Admin   •

## 2024-09-05 NOTE — ANESTHESIA POSTPROCEDURE EVALUATION
Department of Anesthesiology  Postprocedure Note    Patient: Nory Noe  MRN: [id number]  YOB: 1955  Date of evaluation: 9/5/2024    Procedure Summary       Date: 09/05/24 Room / Location: [facility] ASU  / [facility] AMBULATORY OR    Anesthesia Start: 1125 Anesthesia Stop: 1502    Procedures:       LEFT BREAST REDUCTION LUMPECTOMY, LEFT BREAST SENTINEL NODE BIOPSY (Left: Breast)      LEFT ONCOPLASTIC REDUCTION, RIGHT REDUCTION FOR SYMMETRY (Bilateral: Breast) Diagnosis:       Malignant neoplasm of left breast (HCC)      (Malignant neoplasm of left breast (HCC) [C50.912])    Surgeons: Sherman Alatorre Jr, MD; Kristal Carter MD Responsible Provider: Sherman Ruth MD    Anesthesia Type: General ASA Status: 2            Anesthesia Type: General    Ronnie Phase I: Ronnie Score: 10    Ronnie Phase II:      Anesthesia Post Evaluation    Patient location during evaluation: PACU  Patient participation: complete - patient participated  Level of consciousness: awake  Airway patency: patent  Nausea & Vomiting: no nausea  Cardiovascular status: blood pressure returned to baseline and hemodynamically stable  Respiratory status: acceptable  Hydration status: stable  Multimodal analgesia pain management approach    No notable events documented.

## 2024-09-05 NOTE — BRIEF OP NOTE
Brief Postoperative Note      Patient: Nory Noe  YOB: 1955  MRN: [id number]    Date of Procedure: 9/5/2024    Pre-Op Diagnosis Codes:      * Malignant neoplasm of left breast (HCC) [C50.912]    Post-Op Diagnosis: Same       Procedure(s):  LEFT BREAST REDUCTION LUMPECTOMY, LEFT BREAST SENTINEL NODE BIOPSY  LEFT ONCOPLASTIC REDUCTION, RIGHT REDUCTION FOR SYMMETRY    Surgeon(s):  Kristal Carter MD Pellicane Jr, James V, MD    Assistant:  Surgical Assistant: Randal Muhammad    Anesthesia: General    Estimated Blood Loss (mL): Minimal    Complications: None    Specimens:   ID Type Source Tests Collected by Time Destination   1 : LEFT AXILLARY SENTINEL NODE 1 Tissue Breast SURGICAL PATHOLOGY Sherman Alatorre Jr, MD 9/5/2024 1200    2 : LEFT AXILLARY SENTINEL NODE 2 Tissue Breast SURGICAL PATHOLOGY Sherman Alatorre Jr, MD 9/5/2024 1202    3 : LEFT AXILLARY SENTINEL NODE 3 Tissue Breast SURGICAL PATHOLOGY Sherman Alatorre Jr, MD 9/5/2024 1203    4 : LEFT BREAST LUMPECTOMY Tissue Breast SURGICAL PATHOLOGY Sherman Alatorre Jr, MD 9/5/2024 1213        Implants:  * No implants in log *      Drains: * No LDAs found *    Findings:  Infection Present At Time Of Surgery (PATOS) (choose all levels that have infection present):  No infection present  Other Findings: sent nodes x 3, spec sono pos clip x 2         Electronically signed by Sherman Alatorre MD on 9/5/2024 at 12:21 PM

## 2024-09-05 NOTE — PROGRESS NOTES
I have reviewed discharge instructions with the  patient and caregiver.  The patient and caregiver verbalized understanding. All questions addressed at this time. A paper copy of these instructions have been given to the patient to take home.

## 2024-09-05 NOTE — DISCHARGE INSTRUCTIONS
PLASTIC SURGERY POST-OPERATIVE INSTRUCTIONS      BRA:  You should wear your surgical bra at all times for four weeks following surgery. You may remove the garment to shower.    ACTIVITY:  Take it easy for the first several days.  No cleaning, housework, or strenuous activity.  Do not lift anything over 10 pounds, including children.  No running, aerobics, or other strenuous activities until four weeks.  However, do not remain constantly in bed.  You should walk at least three times daily, with assistance if needed.  It is normal to feel “tight” when standing for the first several weeks, so you may want to hunch over slightly when walking.  Your activity restrictions are in place for four weeks following surgery.    BATHING:  You may shower starting Saturday 9/7.  Use a long piece of string or yarn to make a necklace to loop through the CHAYITO drain tabs, so they are not dangling in the shower.  NO tub baths, hot tubs, swimming, or any submersion in water for 6 weeks following surgery.  Skin glue covering your incisions will fall off on its own.  If it is still present at two weeks, you may peel or scrub it off. If you have mesh tape over your incisions, you may apply Vaseline to the dressings and peel them off two weeks after your surgery date.    MEDICATION:  Your prescriptions will be sent electronically to your pharmacy.  You will receive prescriptions for an antibiotic, narcotic pain medication, stool softener (senokot). If you have indicated that you become nauseated with narcotic pain medication, you have been prescribed an anti-nausea medication (Zofran).  Take the antibiotic for the full duration of the prescription. Take the narcotic pain medication as needed for pain. Take the stool softener while you are on prescription narcotic pain medicine. Do not drive while taking narcotic pain medication.  You should take non-steroidal anti-inflammatories (e.g. ibuprofen, advil, etc.) and acetaminophen (tylenol) instead

## 2024-09-05 NOTE — OP NOTE
[address]  [address]                            OPERATIVE REPORT      PATIENT NAME: IESHA BAKER             : 1955  MED REC NO: [id number]                       ROOM: [facility]  ACCOUNT NO: [id number]                       ADMIT DATE: 2024  PROVIDER: Sherman Alatorre Jr, MD    DATE OF SERVICE:  2024    PREOPERATIVE DIAGNOSES:  Ductal carcinoma in situ of the left breast, multifocal.    POSTOPERATIVE DIAGNOSES:  Ductal carcinoma in situ of the left breast, multifocal.    PROCEDURES PERFORMED:  Left reduction lumpectomy with intraoperative ultrasound and left sentinel lymph node biopsy with oncoplastic reduction by Dr. Sofia Carter.    SURGEON:  Sherman Alatorre Jr, MD    ASSISTANT:  Randal Muhammad SA    ANESTHESIA:  General.    ESTIMATED BLOOD LOSS:  Minimal.    SPECIMENS REMOVED:  Left axillary sentinel lymph nodes x3 and left breast lumpectomy.    INTRAOPERATIVE FINDINGS:  Belpre nodes x3 and specimen ultrasound revealed presence of clips x2.     COMPLICATIONS:  None.    IMPLANTS:  None.    INDICATIONS:  The patient is a 69-year-old female with above diagnosis.    DESCRIPTION OF PROCEDURE:  After lymphoscintigraphy in Radiology, the patient was brought to the operating room.  After the satisfactory induction of general and LMA anesthesia, the patient was prepped and draped in a sterile fashion.  An axillary incision was made and deepened through subcutaneous tissues with Bovie cautery.  Utilizing Neoprobe, 3 sentinel nodes were found in the deep axilla.  They were removed and sent for permanent section.  All dissection planes were hemostatic.  The wound was closed with interrupted 3-0 Vicryl and a running subcuticular 4-0 Monocryl on skin.  Attention was then turned to the breast, where the inferior lateral portion of the Wise pattern incision was opened.  The skin flap was raised inferomedially to the area of both 
pocket was irrigated and hemostasis achieved. 20 cc of exparel had been expanded with 20 cc of bupivacaine 0.25% and 40 cc of saline. 40 cc of the exparel mixture was infiltrated into the right chest wall. The incisions were stapled closed.      I then addressed the right breast. I designed a superior pedicle on the right breast. A new nipple areolar complex was designed with 42 mm diameter. The superior pedicle was dissected, leaving an inferior triangular skin flap. The medial and lateral breast flaps were designed and mobilized. I then resected the tissue from the inferior breast and removed the specimen en bloc. The area was irrigated and hemostasis was achieved. The remaining 40 cc of the exparel mixture was infiltrated into the left chest wall as a PEC block, inframammary fold block and intercostal nerve block. The skin was stapled shut.     I then assessed symmetry which was noted to be good. The left breast was irrigated and hemostasis again achieved.  The inferior fold was reconstructed with figure of eight #2-0 PDS sutures. The skin was then closed with deep dermal 3-0 monocryl followed by a running subcuticular 3-0 monocryl stratafix.       I turned my attention to the right side. The right breast was irrigated and hemostasis again achieved. The inferior fold was reconstructed with figure of eight #2-0 PDS sutures.  The skin was then closed with deep dermal 3-0 monocryl followed by a running subcuticular 3-0 monocryl stratafix. Both nipple areolar complexes were noted to be viable and healthy. All sharp, sponge and instrument counts were correct prior to the closure of the incisions. Sterile dressings were applied. Patient was extubated without event and transferred to the pacu in stable condition.     Electronically signed by Kristal Carter MD on 9/5/2024 at 2:43 PM

## 2024-09-11 ENCOUNTER — CLINICAL DOCUMENTATION (OUTPATIENT)
Age: 69
End: 2024-09-11

## 2024-09-12 ENCOUNTER — PATIENT MESSAGE (OUTPATIENT)
Age: 69
End: 2024-09-12

## 2024-09-12 RX ORDER — ATORVASTATIN CALCIUM 10 MG/1
10 TABLET, FILM COATED ORAL DAILY
Qty: 90 TABLET | Refills: 0 | Status: SHIPPED | OUTPATIENT
Start: 2024-09-12

## 2024-09-18 ENCOUNTER — OFFICE VISIT (OUTPATIENT)
Age: 69
End: 2024-09-18

## 2024-09-18 ENCOUNTER — TELEPHONE (OUTPATIENT)
Age: 69
End: 2024-09-18

## 2024-09-18 VITALS — HEIGHT: 63 IN | BODY MASS INDEX: 35.61 KG/M2 | WEIGHT: 201 LBS

## 2024-09-18 DIAGNOSIS — C50.912 INVASIVE DUCTAL CARCINOMA OF BREAST, FEMALE, LEFT (HCC): ICD-10-CM

## 2024-09-18 DIAGNOSIS — Z98.890 S/P LUMPECTOMY, RIGHT BREAST: Primary | ICD-10-CM

## 2024-09-18 PROCEDURE — 99024 POSTOP FOLLOW-UP VISIT: CPT | Performed by: SURGERY

## 2024-10-03 ENCOUNTER — ANESTHESIA EVENT (OUTPATIENT)
Facility: HOSPITAL | Age: 69
End: 2024-10-03
Payer: MEDICARE

## 2024-10-03 ENCOUNTER — ANESTHESIA (OUTPATIENT)
Facility: HOSPITAL | Age: 69
End: 2024-10-03
Payer: MEDICARE

## 2024-10-03 ENCOUNTER — HOSPITAL ENCOUNTER (OUTPATIENT)
Facility: HOSPITAL | Age: 69
Setting detail: OUTPATIENT SURGERY
Discharge: HOME | End: 2024-10-03
Attending: SURGERY | Admitting: SURGERY
Payer: MEDICARE

## 2024-10-03 VITALS
OXYGEN SATURATION: 99 % | TEMPERATURE: 97.8 F | SYSTOLIC BLOOD PRESSURE: 125 MMHG | DIASTOLIC BLOOD PRESSURE: 76 MMHG | HEIGHT: 63 IN | BODY MASS INDEX: 35.62 KG/M2 | WEIGHT: 201.06 LBS | HEART RATE: 82 BPM | RESPIRATION RATE: 13 BRPM

## 2024-10-03 DIAGNOSIS — C50.912 MALIGNANT NEOPLASM OF LEFT BREAST IN FEMALE, ESTROGEN RECEPTOR POSITIVE, UNSPECIFIED SITE OF BREAST (HCC): ICD-10-CM

## 2024-10-03 DIAGNOSIS — Z17.0 MALIGNANT NEOPLASM OF LEFT BREAST IN FEMALE, ESTROGEN RECEPTOR POSITIVE, UNSPECIFIED SITE OF BREAST (HCC): ICD-10-CM

## 2024-10-03 LAB
GLUCOSE BLD STRIP.AUTO-MCNC: 128 MG/DL (ref 65–117)
GLUCOSE BLD STRIP.AUTO-MCNC: 140 MG/DL (ref 65–117)
SERVICE CMNT-IMP: ABNORMAL
SERVICE CMNT-IMP: ABNORMAL

## 2024-10-03 PROCEDURE — 3600000013 HC SURGERY LEVEL 3 ADDTL 15MIN: Performed by: SURGERY

## 2024-10-03 PROCEDURE — 88307 TISSUE EXAM BY PATHOLOGIST: CPT

## 2024-10-03 PROCEDURE — 7100000000 HC PACU RECOVERY - FIRST 15 MIN: Performed by: SURGERY

## 2024-10-03 PROCEDURE — 82962 GLUCOSE BLOOD TEST: CPT

## 2024-10-03 PROCEDURE — 7100000001 HC PACU RECOVERY - ADDTL 15 MIN: Performed by: SURGERY

## 2024-10-03 PROCEDURE — 3700000001 HC ADD 15 MINUTES (ANESTHESIA): Performed by: SURGERY

## 2024-10-03 PROCEDURE — 2500000003 HC RX 250 WO HCPCS: Performed by: SURGERY

## 2024-10-03 PROCEDURE — 2580000003 HC RX 258: Performed by: NURSE ANESTHETIST, CERTIFIED REGISTERED

## 2024-10-03 PROCEDURE — 19301 PARTIAL MASTECTOMY: CPT | Performed by: SURGERY

## 2024-10-03 PROCEDURE — 6360000002 HC RX W HCPCS: Performed by: NURSE ANESTHETIST, CERTIFIED REGISTERED

## 2024-10-03 PROCEDURE — 3700000000 HC ANESTHESIA ATTENDED CARE: Performed by: SURGERY

## 2024-10-03 PROCEDURE — 6360000002 HC RX W HCPCS: Performed by: SURGERY

## 2024-10-03 PROCEDURE — 3600000003 HC SURGERY LEVEL 3 BASE: Performed by: SURGERY

## 2024-10-03 PROCEDURE — 2709999900 HC NON-CHARGEABLE SUPPLY: Performed by: SURGERY

## 2024-10-03 PROCEDURE — 2500000003 HC RX 250 WO HCPCS: Performed by: NURSE ANESTHETIST, CERTIFIED REGISTERED

## 2024-10-03 RX ORDER — SODIUM CHLORIDE 9 MG/ML
INJECTION, SOLUTION INTRAVENOUS PRN
Status: CANCELLED | OUTPATIENT
Start: 2024-10-03

## 2024-10-03 RX ORDER — BUPIVACAINE HYDROCHLORIDE 5 MG/ML
30 INJECTION, SOLUTION EPIDURAL; INTRACAUDAL ONCE
Status: DISCONTINUED | OUTPATIENT
Start: 2024-10-03 | End: 2024-10-03 | Stop reason: HOSPADM

## 2024-10-03 RX ORDER — ONDANSETRON 2 MG/ML
4 INJECTION INTRAMUSCULAR; INTRAVENOUS
Status: CANCELLED | OUTPATIENT
Start: 2024-10-03 | End: 2024-10-04

## 2024-10-03 RX ORDER — ACETAMINOPHEN 500 MG
1000 TABLET ORAL ONCE
Status: DISCONTINUED | OUTPATIENT
Start: 2024-10-03 | End: 2024-10-03 | Stop reason: HOSPADM

## 2024-10-03 RX ORDER — SODIUM CHLORIDE 9 MG/ML
INJECTION, SOLUTION INTRAVENOUS PRN
Status: DISCONTINUED | OUTPATIENT
Start: 2024-10-03 | End: 2024-10-03 | Stop reason: HOSPADM

## 2024-10-03 RX ORDER — SODIUM CHLORIDE 0.9 % (FLUSH) 0.9 %
5-40 SYRINGE (ML) INJECTION EVERY 12 HOURS SCHEDULED
Status: CANCELLED | OUTPATIENT
Start: 2024-10-03

## 2024-10-03 RX ORDER — PROCHLORPERAZINE EDISYLATE 5 MG/ML
5 INJECTION INTRAMUSCULAR; INTRAVENOUS
Status: CANCELLED | OUTPATIENT
Start: 2024-10-03 | End: 2024-10-04

## 2024-10-03 RX ORDER — SODIUM CHLORIDE 9 MG/ML
INJECTION, SOLUTION INTRAVENOUS
Status: DISCONTINUED | OUTPATIENT
Start: 2024-10-03 | End: 2024-10-03 | Stop reason: SDUPTHER

## 2024-10-03 RX ORDER — SODIUM CHLORIDE 0.9 % (FLUSH) 0.9 %
5-40 SYRINGE (ML) INJECTION EVERY 12 HOURS SCHEDULED
Status: DISCONTINUED | OUTPATIENT
Start: 2024-10-03 | End: 2024-10-03 | Stop reason: HOSPADM

## 2024-10-03 RX ORDER — SODIUM CHLORIDE 0.9 % (FLUSH) 0.9 %
5-40 SYRINGE (ML) INJECTION PRN
Status: DISCONTINUED | OUTPATIENT
Start: 2024-10-03 | End: 2024-10-03 | Stop reason: HOSPADM

## 2024-10-03 RX ORDER — PHENYLEPHRINE HCL IN 0.9% NACL 0.4MG/10ML
SYRINGE (ML) INTRAVENOUS
Status: DISCONTINUED | OUTPATIENT
Start: 2024-10-03 | End: 2024-10-03 | Stop reason: SDUPTHER

## 2024-10-03 RX ORDER — EPHEDRINE SULFATE 50 MG/ML
INJECTION INTRAVENOUS
Status: DISCONTINUED | OUTPATIENT
Start: 2024-10-03 | End: 2024-10-03 | Stop reason: SDUPTHER

## 2024-10-03 RX ORDER — HYDRALAZINE HYDROCHLORIDE 20 MG/ML
10 INJECTION INTRAMUSCULAR; INTRAVENOUS ONCE
Status: CANCELLED | OUTPATIENT
Start: 2024-10-03 | End: 2024-10-03

## 2024-10-03 RX ORDER — CEFAZOLIN SODIUM 1 G/3ML
INJECTION, POWDER, FOR SOLUTION INTRAMUSCULAR; INTRAVENOUS
Status: DISCONTINUED | OUTPATIENT
Start: 2024-10-03 | End: 2024-10-03 | Stop reason: SDUPTHER

## 2024-10-03 RX ORDER — OXYCODONE HYDROCHLORIDE 5 MG/1
5 TABLET ORAL
Status: CANCELLED | OUTPATIENT
Start: 2024-10-03 | End: 2024-10-04

## 2024-10-03 RX ORDER — MIDAZOLAM HYDROCHLORIDE 1 MG/ML
INJECTION INTRAMUSCULAR; INTRAVENOUS
Status: DISCONTINUED | OUTPATIENT
Start: 2024-10-03 | End: 2024-10-03 | Stop reason: SDUPTHER

## 2024-10-03 RX ORDER — ONDANSETRON 2 MG/ML
INJECTION INTRAMUSCULAR; INTRAVENOUS
Status: DISCONTINUED | OUTPATIENT
Start: 2024-10-03 | End: 2024-10-03 | Stop reason: SDUPTHER

## 2024-10-03 RX ORDER — FENTANYL CITRATE 50 UG/ML
100 INJECTION, SOLUTION INTRAMUSCULAR; INTRAVENOUS
Status: DISCONTINUED | OUTPATIENT
Start: 2024-10-03 | End: 2024-10-03 | Stop reason: HOSPADM

## 2024-10-03 RX ORDER — PROPOFOL 10 MG/ML
INJECTION, EMULSION INTRAVENOUS
Status: DISCONTINUED | OUTPATIENT
Start: 2024-10-03 | End: 2024-10-03 | Stop reason: SDUPTHER

## 2024-10-03 RX ORDER — MIDAZOLAM HYDROCHLORIDE 2 MG/2ML
2 INJECTION, SOLUTION INTRAMUSCULAR; INTRAVENOUS PRN
Status: DISCONTINUED | OUTPATIENT
Start: 2024-10-03 | End: 2024-10-03 | Stop reason: HOSPADM

## 2024-10-03 RX ORDER — NALOXONE HYDROCHLORIDE 0.4 MG/ML
INJECTION, SOLUTION INTRAMUSCULAR; INTRAVENOUS; SUBCUTANEOUS PRN
Status: CANCELLED | OUTPATIENT
Start: 2024-10-03

## 2024-10-03 RX ORDER — FENTANYL CITRATE 50 UG/ML
INJECTION, SOLUTION INTRAMUSCULAR; INTRAVENOUS
Status: DISCONTINUED | OUTPATIENT
Start: 2024-10-03 | End: 2024-10-03 | Stop reason: SDUPTHER

## 2024-10-03 RX ORDER — SODIUM CHLORIDE 0.9 % (FLUSH) 0.9 %
5-40 SYRINGE (ML) INJECTION PRN
Status: CANCELLED | OUTPATIENT
Start: 2024-10-03

## 2024-10-03 RX ORDER — LIDOCAINE HYDROCHLORIDE AND EPINEPHRINE 10; 10 MG/ML; UG/ML
30 INJECTION, SOLUTION INFILTRATION; PERINEURAL ONCE
Status: DISCONTINUED | OUTPATIENT
Start: 2024-10-03 | End: 2024-10-03 | Stop reason: HOSPADM

## 2024-10-03 RX ORDER — FENTANYL CITRATE 50 UG/ML
25 INJECTION, SOLUTION INTRAMUSCULAR; INTRAVENOUS EVERY 5 MIN PRN
Status: CANCELLED | OUTPATIENT
Start: 2024-10-03

## 2024-10-03 RX ORDER — SODIUM CHLORIDE, SODIUM LACTATE, POTASSIUM CHLORIDE, CALCIUM CHLORIDE 600; 310; 30; 20 MG/100ML; MG/100ML; MG/100ML; MG/100ML
INJECTION, SOLUTION INTRAVENOUS CONTINUOUS
Status: DISCONTINUED | OUTPATIENT
Start: 2024-10-03 | End: 2024-10-03 | Stop reason: HOSPADM

## 2024-10-03 RX ORDER — LIDOCAINE HYDROCHLORIDE 10 MG/ML
1 INJECTION, SOLUTION INFILTRATION; PERINEURAL
Status: DISCONTINUED | OUTPATIENT
Start: 2024-10-03 | End: 2024-10-03 | Stop reason: HOSPADM

## 2024-10-03 RX ORDER — DEXAMETHASONE SODIUM PHOSPHATE 4 MG/ML
INJECTION, SOLUTION INTRA-ARTICULAR; INTRALESIONAL; INTRAMUSCULAR; INTRAVENOUS; SOFT TISSUE
Status: DISCONTINUED | OUTPATIENT
Start: 2024-10-03 | End: 2024-10-03 | Stop reason: SDUPTHER

## 2024-10-03 RX ORDER — LIDOCAINE HYDROCHLORIDE 20 MG/ML
INJECTION, SOLUTION EPIDURAL; INFILTRATION; INTRACAUDAL; PERINEURAL
Status: DISCONTINUED | OUTPATIENT
Start: 2024-10-03 | End: 2024-10-03 | Stop reason: SDUPTHER

## 2024-10-03 RX ORDER — HYDROMORPHONE HYDROCHLORIDE 1 MG/ML
0.5 INJECTION, SOLUTION INTRAMUSCULAR; INTRAVENOUS; SUBCUTANEOUS EVERY 5 MIN PRN
Status: CANCELLED | OUTPATIENT
Start: 2024-10-03

## 2024-10-03 RX ADMIN — ONDANSETRON HYDROCHLORIDE 4 MG: 2 INJECTION, SOLUTION INTRAMUSCULAR; INTRAVENOUS at 12:02

## 2024-10-03 RX ADMIN — SODIUM CHLORIDE: 9 INJECTION, SOLUTION INTRAVENOUS at 11:50

## 2024-10-03 RX ADMIN — Medication 30 MG: at 12:08

## 2024-10-03 RX ADMIN — PROPOFOL 200 MG: 10 INJECTION, EMULSION INTRAVENOUS at 11:56

## 2024-10-03 RX ADMIN — EPHEDRINE SULFATE 10 MG: 50 INJECTION INTRAVENOUS at 12:18

## 2024-10-03 RX ADMIN — FENTANYL CITRATE 50 MCG: 50 INJECTION INTRAMUSCULAR; INTRAVENOUS at 12:14

## 2024-10-03 RX ADMIN — PROPOFOL 50 MG: 10 INJECTION, EMULSION INTRAVENOUS at 12:38

## 2024-10-03 RX ADMIN — Medication 80 MCG: at 12:19

## 2024-10-03 RX ADMIN — DEXAMETHASONE SODIUM PHOSPHATE 4 MG: 4 INJECTION, SOLUTION INTRAMUSCULAR; INTRAVENOUS at 12:02

## 2024-10-03 RX ADMIN — MIDAZOLAM 2 MG: 1 INJECTION INTRAMUSCULAR; INTRAVENOUS at 11:50

## 2024-10-03 RX ADMIN — CEFAZOLIN 2 G: 1 INJECTION, POWDER, FOR SOLUTION INTRAMUSCULAR; INTRAVENOUS at 12:02

## 2024-10-03 RX ADMIN — FENTANYL CITRATE 50 MCG: 50 INJECTION INTRAMUSCULAR; INTRAVENOUS at 12:39

## 2024-10-03 RX ADMIN — PROPOFOL 100 MG: 10 INJECTION, EMULSION INTRAVENOUS at 12:05

## 2024-10-03 RX ADMIN — LIDOCAINE HYDROCHLORIDE 100 MG: 20 INJECTION, SOLUTION EPIDURAL; INFILTRATION; INTRACAUDAL; PERINEURAL at 11:56

## 2024-10-03 ASSESSMENT — PAIN SCALES - GENERAL
PAINLEVEL_OUTOF10: 0
PAINLEVEL_OUTOF10: 0

## 2024-10-03 ASSESSMENT — PAIN - FUNCTIONAL ASSESSMENT: PAIN_FUNCTIONAL_ASSESSMENT: 0-10

## 2024-10-03 NOTE — ANESTHESIA POSTPROCEDURE EVALUATION
Department of Anesthesiology  Postprocedure Note    Patient: Nory Noe  MRN: [id number]  YOB: 1955  Date of evaluation: 10/3/2024    Procedure Summary       Date: 10/03/24 Room / Location: [facility] ASU  / [facility] AMBULATORY OR    Anesthesia Start: 1150 Anesthesia Stop: 1255    Procedure: RE-EXCISION SUPERIOR MARGIN LEFT BREAST (Left: Breast) Diagnosis:       Malignant neoplasm of left breast in female, estrogen receptor positive, unspecified site of breast (HCC)      (Malignant neoplasm of left breast in female, estrogen receptor positive, unspecified site of breast (HCC) [C50.912, Z17.0])    Surgeons: Sherman Alatorre Jr, MD Responsible Provider: Kimberley Adams DO    Anesthesia Type: general ASA Status: 2            Anesthesia Type: No value filed.    Ronnie Phase I: Ronnie Score: 9    Ronnie Phase II:      Anesthesia Post Evaluation    Patient location during evaluation: PACU  Patient participation: complete - patient participated  Level of consciousness: awake and alert  Pain score: 0  Airway patency: patent  Nausea & Vomiting: no nausea and no vomiting  Cardiovascular status: blood pressure returned to baseline and hemodynamically stable  Respiratory status: acceptable and room air  Hydration status: euvolemic  Multimodal analgesia pain management approach  Pain management: adequate and satisfactory to patient    No notable events documented.

## 2024-10-03 NOTE — OP NOTE
Operative Note      Patient: Nory Noe  YOB: 1955  MRN: [id number]    Date of Procedure: 10/3/2024    Pre-Op Diagnosis Codes:      * Malignant neoplasm of left breast in female, estrogen receptor positive, unspecified site of breast (HCC) [C50.912, Z17.0]    Post-Op Diagnosis: Same       Procedure(s):  RE-EXCISION SUPERIOR MARGIN LEFT BREAST    Surgeon(s):  Sherman Alatorre Jr, MD Goyal, Samita S, MD    Assistant:   Surgical Assistant: Mando Mcnair    Anesthesia: General    Estimated Blood Loss (mL): less than 50     Complications: None    Specimens:   ID Type Source Tests Collected by Time Destination   1 : LEFT BREAST SUPERIOR MARGIN Tissue Tissue SURGICAL PATHOLOGY Sherman Alatorre Jr, MD 10/3/2024 1221        Implants:  * No implants in log *      Drains: * No LDAs found *    Findings:  Infection Present At Time Of Surgery (PATOS) (choose all levels that have infection present):  No infection present  Other Findings: seroma cavity encountered, superior margin taken     Indications: This is a 69 year-old female who was diagnosed with left breast cancer. She underwent left breast oncoplastic reduction with right breast reduction for symmetry on 9/5/24. Her superior margin was found to be positive. She presents today for re-excision.     Detailed Description of Procedure:   Patient was greeted in the preoperative holding area. Informed consent was reviewed. She was marked. Preoperative antibiotics were given and she was brought back to the operating room. She was sedated and intubated. Her surgical sites were prepped and draped in the usual sterile fashion. A formal timeout was performed confirming the patient, operative site, operation to be performed, allergies, antibiotic status, medications on the field and all members of the team.      I then incised the vertical limb and central section of the horizontal incision. I carried my dissection down through the subcutaneous tissue until the  seroma cavity was encountered. This was drained. Dr. Alatorre performed his portion of the procedure. For more details please refer to his dictated operative report. Once he was completed, I injected local anesthesia consisting of bupivacaine 0.25%/lidocaine 1% into the chest wall. I then mobilized the surrounding tissue to obliterate the dead space, total area 15 cm^2.     The skin was then closed with deep dermal 3-0 monocryl followed by a running subcuticular 3-0 monocryl stratafix. All sharp, sponge and instrument counts were correct prior to the closure of the incisions. Sterile dressings were applied. Patient was extubated without event and transferred to the pacu in stable condition.     Electronically signed by Kristal Carter MD on 10/3/2024 at 12:46 PM

## 2024-10-03 NOTE — DISCHARGE INSTRUCTIONS
Discharge Instructions from Dr. Alatorre    I will call you with the pathology results, typically within 1 week from today.  You may shower, but no hot tubs, swimming pools, or baths until your incision is healed.  No heavy lifting with the affected extremity (nothing greater than 5 pounds), and limit its use for the next 4-5 days.  You may use an ice pack for comfort for the next couple of days, but do not place ice directly on the skin.  Rather, use a towel or clothing to serve as a barrier between skin and ice to prevent injury.  If I placed a drain, follow the drain instructions provided, especially as you keep a record of the drain output.  Follow medication instructions carefully.  Watch for signs of infection as listed below.  Redness  Swelling  Drainage from the incision or from your nipple that appears infected  Fever over 101 degrees for consecutive readings, or over 99.5 if you are currently undergoing chemotherapy.  Call our office (number is below) for a follow-up appointment.  If you have any problems, our phone number is [phone].

## 2024-10-03 NOTE — H&P
HISTORY OF PRESENT ILLNESS  Nory Noe is a 69 y.o. female.     HPI  ESTABLISHED patient here for POD# 13 LEFT breast lumpectomy/reduction.  Patient reports doing well with no concerns today     5/14/2024: LEFT breast biopsy, 5-6:00, PATH: IDC, ER+(100%)/ME+(100%)/HER-2-, Ki-67 7%, favor histologic grade 2, 7.0mm largest invasive tumor focus in 1 tissue core, DCIS, ER+(100%)/ME+(100%), nuclear grade 1-2, solid and cribriform types.               - MammaPrint: Low Risk, Luminal A-Type, +0.242     6/27/24: LEFT breast, bx PATH: DCIS, nuclear gr 2, solid patterns. ER+(100%)/ME+(100%).      9/5/24: LEFT breast reduction lumpectomy and LEFT SLNBx (with Dr. Carter) PATH:   - LEFT axillary sentinel node #1, biopsy: One lymph node, positive for micrometastatic carcinoma (1/1).   - LEFT axillary sentinel node #2 and #3, biopsy: Two LNs, negative for metastatic carcinoma (0/2).   - LEFT breast, lumpectomy: IDC, overall grade 2, 25mm at greatest dimension. DCIS present. Negative margins. Pathologic stage: pT2, pN1mi (sn), ER+/ME+/HER2-, Ki-67 7%.   - LEFT breast skin, excision: Benign skin. Negative for invasive or in situ carcinoma.   - LEFT breast tissue, excision: Benign skin and subcutaneous tissue.   Negative for invasive or in situ carcinoma.   - RIGHT breast tissue, excision: Benign skin and breast tissue.   Negative for invasive or in situ carcinoma.         Past Medical History        Past Medical History:   Diagnosis Date    Acquired absence of both cervix and uterus 4/5/2012    Breast cancer (HCC)       LEFT    Diabetes (HCC)      Hx of bone density study 11/25/2015     Osteopenia    Hx of mammogram 2/25/13     Negative    Hypercholesterolemia       PT STATES SHE ONLY TAKES MEDS TO KEEP HER CHOLESTEROL LOW DUE TO HER DIABETES    Hypertension       PT STATES SHE ONLY TAKES BP MEDS TO KEEP HER BP LOW DUE TO HER DIABETES    Obesity       Long time    Osteoarthritis Several years    Osteopenia 2015,11/11     Mild

## 2024-10-04 NOTE — OP NOTE
[address]  [address]                            OPERATIVE REPORT      PATIENT NAME: IESHA BAKER             : 1955  MED REC NO: [id number]                       ROOM: [facility]  ACCOUNT NO: [id number]                       ADMIT DATE: 10/03/2024  PROVIDER: Karis Alatorre Jr, MD    DATE OF SERVICE:  10/03/2024    PREOPERATIVE DIAGNOSES:  Carcinoma of the left breast, status post reduction lumpectomy with positive superior DCIS margin.    POSTOPERATIVE DIAGNOSES:  Carcinoma of the left breast, status post reduction lumpectomy with positive superior DCIS margin.    PROCEDURES PERFORMED:  Re-excision of superior margin of left reduction lumpectomy.    SURGEON:  Karis Alatorre Jr, MD    ASSISTANT:  staff    ANESTHESIA:  General.    ESTIMATED BLOOD LOSS:  minimal    SPECIMENS REMOVED:  Superior margin left lumpectomy.    INTRAOPERATIVE FINDINGS:  None.     COMPLICATIONS:  none    IMPLANTS:  none    INDICATIONS:  The patient is a 69-year-old female, who has undergone a reduction lumpectomy for carcinoma of the left breast.  She had a positive superior DCIS margin, therefore was admitted for re-excision procedure.    DESCRIPTION OF PROCEDURE:  After satisfactory induction of general LMA anesthesia, the patient was prepped and draped in the sterile fashion. Dr. Sofia Carter was in the room as she had done the previous reduction and opened the incision and drained the seroma cavity.  The superior margin was identified and I reexcised superior margin widely.  The wound was hemostatic and at this point in time, Dr. Carter completed the closure of the previous reduction lumpectomy.        KARIS ALATORRE JR, MD      JVP/AQS  D:  10/03/2024 15:55:15  T:  10/03/2024 22:40:45  JOB #:  [id number]    CC:    __________        Kristal Carter MD

## 2024-10-08 ENCOUNTER — TELEPHONE (OUTPATIENT)
Age: 69
End: 2024-10-08

## 2024-10-08 DIAGNOSIS — C50.912 INVASIVE DUCTAL CARCINOMA OF BREAST, FEMALE, LEFT (HCC): Primary | ICD-10-CM

## 2024-10-13 SDOH — HEALTH STABILITY: PHYSICAL HEALTH: ON AVERAGE, HOW MANY MINUTES DO YOU ENGAGE IN EXERCISE AT THIS LEVEL?: 30 MIN

## 2024-10-13 SDOH — HEALTH STABILITY: PHYSICAL HEALTH: ON AVERAGE, HOW MANY DAYS PER WEEK DO YOU ENGAGE IN MODERATE TO STRENUOUS EXERCISE (LIKE A BRISK WALK)?: 4 DAYS

## 2024-10-13 ASSESSMENT — LIFESTYLE VARIABLES
HOW MANY STANDARD DRINKS CONTAINING ALCOHOL DO YOU HAVE ON A TYPICAL DAY: 1 OR 2
HOW OFTEN DO YOU HAVE SIX OR MORE DRINKS ON ONE OCCASION: 1
HOW OFTEN DO YOU HAVE A DRINK CONTAINING ALCOHOL: 4
HOW MANY STANDARD DRINKS CONTAINING ALCOHOL DO YOU HAVE ON A TYPICAL DAY: 1
HOW OFTEN DO YOU HAVE A DRINK CONTAINING ALCOHOL: 2-3 TIMES A WEEK

## 2024-10-13 ASSESSMENT — PATIENT HEALTH QUESTIONNAIRE - PHQ9
SUM OF ALL RESPONSES TO PHQ QUESTIONS 1-9: 0
SUM OF ALL RESPONSES TO PHQ9 QUESTIONS 1 & 2: 0
2. FEELING DOWN, DEPRESSED OR HOPELESS: NOT AT ALL
SUM OF ALL RESPONSES TO PHQ QUESTIONS 1-9: 0
1. LITTLE INTEREST OR PLEASURE IN DOING THINGS: NOT AT ALL
SUM OF ALL RESPONSES TO PHQ QUESTIONS 1-9: 0
SUM OF ALL RESPONSES TO PHQ QUESTIONS 1-9: 0

## 2024-10-16 ENCOUNTER — TELEPHONE (OUTPATIENT)
Age: 69
End: 2024-10-16

## 2024-10-16 ENCOUNTER — OFFICE VISIT (OUTPATIENT)
Age: 69
End: 2024-10-16

## 2024-10-16 VITALS — BODY MASS INDEX: 35.61 KG/M2 | HEIGHT: 63 IN | WEIGHT: 201 LBS

## 2024-10-16 DIAGNOSIS — C50.912 INVASIVE DUCTAL CARCINOMA OF BREAST, FEMALE, LEFT (HCC): Primary | ICD-10-CM

## 2024-10-16 DIAGNOSIS — Z17.0 MALIGNANT NEOPLASM OF LEFT BREAST IN FEMALE, ESTROGEN RECEPTOR POSITIVE, UNSPECIFIED SITE OF BREAST (HCC): ICD-10-CM

## 2024-10-16 DIAGNOSIS — C50.911 BREAST CANCER, STAGE 1, RIGHT (HCC): Primary | ICD-10-CM

## 2024-10-16 DIAGNOSIS — C50.912 MALIGNANT NEOPLASM OF LEFT BREAST IN FEMALE, ESTROGEN RECEPTOR POSITIVE, UNSPECIFIED SITE OF BREAST (HCC): ICD-10-CM

## 2024-10-16 PROCEDURE — 99024 POSTOP FOLLOW-UP VISIT: CPT | Performed by: SURGERY

## 2024-10-16 NOTE — PROGRESS NOTES
HISTORY OF PRESENT ILLNESS  Nory Noe is a 69 y.o. female.    HPI  ESTABLISHED patient here for POST OP visit pertaining to S/P re excision superior margin of LEFT breast. Pt mentioned a small area at the incision site she would like to be looked at. Some tenderness , not unbearable.     5/14/2024: LEFT breast biopsy, 5-6:00, PATH: IDC, ER+(100%)/UT+(100%)/HER-2-, Ki-67 7%, favor histologic grade 2, 7.0mm largest invasive tumor focus in 1 tissue core, DCIS, ER+(100%)/UT+(100%), nuclear grade 1-2, solid and cribriform types.    - MammaPrint: Low Risk, Luminal A-Type, +0.242    6/27/24: LEFT breast, bx PATH: DCIS, nuclear gr 2, solid patterns. ER+(100%)/UT+(100%).     9/5/24: LEFT breast reduction lumpectomy and LEFT SLNBx (with Dr. Carter):   - LEFT axillary sentinel node #1, biopsy: One lymph node, positive for micrometastatic carcinoma (1/1).   - LEFT axillary sentinel node #2 and #3, biopsy: Two LNs, negative for metastatic carcinoma (0/2).   - LEFT breast, lumpectomy: IDC, overall grade 2, 25mm at greatest dimension. DCIS present. Negative margins. Pathologic stage: pT2, pN1mi (sn), ER+/UT+/HER2-, Ki-67 7%.   - LEFT breast skin, excision: Benign skin. Negative for invasive or in situ carcinoma.   - LEFT and RIGHT breast tissue, excision: Benign skin and subcutaneous tissue. Negative for invasive or in situ carcinoma.     10/3/24: LEFT breast, superior margin, excision: Intraductal papilloma. ADH with focal intraductal squamous metaplasia. No evidence for malignancy.       Past Medical History:   Diagnosis Date    Acquired absence of both cervix and uterus 4/5/2012    Breast cancer (HCC)     LEFT    Diabetes (HCC)     Hx of bone density study 11/25/2015    Osteopenia    Hx of mammogram 2/25/13    Negative    Hypercholesterolemia     PT STATES SHE ONLY TAKES MEDS TO KEEP HER CHOLESTEROL LOW DUE TO HER DIABETES    Hypertension     PT STATES SHE ONLY TAKES BP MEDS TO KEEP HER BP LOW DUE TO HER DIABETES    Obesity

## 2024-10-16 NOTE — PROGRESS NOTES
HISTORY OF PRESENT ILLNESS  Nory Noe is a 69 y.o. female     HPI ESTABLISHED patient here for POST OP visit pertaining to S/P re excision superior margin of LEFT breast . Pt mentioned a small area at the incision site she would like to be looked at . Some tenderness , not unbearable.         5/14/2024: LEFT breast biopsy, 5-6:00, PATH: IDC, ER+(100%)/VT+(100%)/HER-2-, Ki-67 7%, favor histologic grade 2, 7.0mm largest invasive tumor focus in 1 tissue core, DCIS, ER+(100%)/VT+(100%), nuclear grade 1-2, solid and cribriform types.               - MammaPrint: Low Risk, Luminal A-Type, +0.242     6/27/24: LEFT breast, bx PATH: DCIS, nuclear gr 2, solid patterns. ER+(100%)/VT+(100%).      9/5/24: LEFT breast reduction lumpectomy and LEFT SLNBx (with Dr. Carter):   - LEFT axillary sentinel node #1, biopsy: One lymph node, positive for micrometastatic carcinoma (1/1).   - LEFT axillary sentinel node #2 and #3, biopsy: Two LNs, negative for metastatic carcinoma (0/2).   - LEFT breast, lumpectomy: IDC, overall grade 2, 25mm at greatest dimension. DCIS present. Negative margins. Pathologic stage: pT2, pN1mi (sn), ER+/VT+/HER2-, Ki-67 7%.   - LEFT breast skin, excision: Benign skin. Negative for invasive or in situ carcinoma.   - LEFT and RIGHT breast tissue, excision: Benign skin and subcutaneous tissue. Negative for invasive or in situ carcinoma.     10/3/24: LEFT breast, superior margin, excision: Intraductal papilloma. ADH with focal intraductal squamous metaplasia. No evidence for malignancy.         Review of Systems      Physical Exam       ASSESSMENT and PLAN  {Assessment and Plan Chronic Disease:[id number]}

## 2024-10-16 NOTE — TELEPHONE ENCOUNTER
Your medical oncology appointment with Dr Diego Stockton is  11/12/2024 @ 4:15 pm  NO JORDAN PER DR MINOR    The address is: [address], [address],  [place] [address]  Telephone # is [phone]    Please arrive 15 minutes early.  Bring your ID, Insurance card, and co-pay if needed.    Please call their office if you need to cancel and reschedule.

## 2024-10-17 ENCOUNTER — OFFICE VISIT (OUTPATIENT)
Age: 69
End: 2024-10-17
Payer: MEDICARE

## 2024-10-17 VITALS
DIASTOLIC BLOOD PRESSURE: 84 MMHG | RESPIRATION RATE: 16 BRPM | WEIGHT: 203.8 LBS | OXYGEN SATURATION: 98 % | HEART RATE: 81 BPM | BODY MASS INDEX: 36.11 KG/M2 | TEMPERATURE: 98 F | SYSTOLIC BLOOD PRESSURE: 140 MMHG | HEIGHT: 63 IN

## 2024-10-17 DIAGNOSIS — C50.912 INVASIVE DUCTAL CARCINOMA OF BREAST, FEMALE, LEFT (HCC): ICD-10-CM

## 2024-10-17 DIAGNOSIS — Z00.00 MEDICARE ANNUAL WELLNESS VISIT, SUBSEQUENT: Primary | ICD-10-CM

## 2024-10-17 DIAGNOSIS — M85.80 OSTEOPENIA, UNSPECIFIED LOCATION: ICD-10-CM

## 2024-10-17 DIAGNOSIS — Z23 FLU VACCINE NEED: ICD-10-CM

## 2024-10-17 DIAGNOSIS — E78.5 HYPERLIPIDEMIA, UNSPECIFIED HYPERLIPIDEMIA TYPE: ICD-10-CM

## 2024-10-17 DIAGNOSIS — E11.9 CONTROLLED TYPE 2 DIABETES MELLITUS WITHOUT COMPLICATION, WITHOUT LONG-TERM CURRENT USE OF INSULIN (HCC): ICD-10-CM

## 2024-10-17 PROBLEM — E03.8 OTHER SPECIFIED HYPOTHYROIDISM: Status: RESOLVED | Noted: 2024-03-05 | Resolved: 2024-10-17

## 2024-10-17 PROCEDURE — 3017F COLORECTAL CA SCREEN DOC REV: CPT | Performed by: STUDENT IN AN ORGANIZED HEALTH CARE EDUCATION/TRAINING PROGRAM

## 2024-10-17 PROCEDURE — G8482 FLU IMMUNIZE ORDER/ADMIN: HCPCS | Performed by: STUDENT IN AN ORGANIZED HEALTH CARE EDUCATION/TRAINING PROGRAM

## 2024-10-17 PROCEDURE — 3044F HG A1C LEVEL LT 7.0%: CPT | Performed by: STUDENT IN AN ORGANIZED HEALTH CARE EDUCATION/TRAINING PROGRAM

## 2024-10-17 PROCEDURE — PBSHW INFLUENZA, FLUAD TRIVALENT, (AGE 65 Y+), IM, PRESERVATIVE FREE, 0.5ML: Performed by: STUDENT IN AN ORGANIZED HEALTH CARE EDUCATION/TRAINING PROGRAM

## 2024-10-17 PROCEDURE — 1123F ACP DISCUSS/DSCN MKR DOCD: CPT | Performed by: STUDENT IN AN ORGANIZED HEALTH CARE EDUCATION/TRAINING PROGRAM

## 2024-10-17 PROCEDURE — G0439 PPPS, SUBSEQ VISIT: HCPCS | Performed by: STUDENT IN AN ORGANIZED HEALTH CARE EDUCATION/TRAINING PROGRAM

## 2024-10-17 PROCEDURE — 90653 IIV ADJUVANT VACCINE IM: CPT | Performed by: STUDENT IN AN ORGANIZED HEALTH CARE EDUCATION/TRAINING PROGRAM

## 2024-10-17 NOTE — PATIENT INSTRUCTIONS
Personalized Preventive Plan for Nory Noe - 10/17/2024  Medicare offers a range of preventive health benefits. Some of the tests and screenings are paid in full while other may be subject to a deductible, co-insurance, and/or copay.    Some of these benefits include a comprehensive review of your medical history including lifestyle, illnesses that may run in your family, and various assessments and screenings as appropriate.    After reviewing your medical record and screening and assessments performed today your provider may have ordered immunizations, labs, imaging, and/or referrals for you.  A list of these orders (if applicable) as well as your Preventive Care list are included within your After Visit Summary for your review.    Other Preventive Recommendations:    A preventive eye exam performed by an eye specialist is recommended every 1-2 years to screen for glaucoma; cataracts, macular degeneration, and other eye disorders.  A preventive dental visit is recommended every 6 months.  Try to get at least 150 minutes of exercise per week or 10,000 steps per day on a pedometer .  Order or download the FREE \"Exercise & Physical Activity: Your Everyday Guide\" from [employer]. Call 1-137.186.2926 or search [employer] online.  You need 8253-7485 mg of calcium and 3397-9300 IU of vitamin D per day. It is possible to meet your calcium requirement with diet alone, but a vitamin D supplement is usually necessary to meet this goal.  When exposed to the sun, use a sunscreen that protects against both UVA and UVB radiation with an SPF of 30 or greater. Reapply every 2 to 3 hours or after sweating, drying off with a towel, or swimming.  Always wear a seat belt when traveling in a car. Always wear a helmet when riding a bicycle or motorcycle.

## 2024-10-17 NOTE — PROGRESS NOTES
daily. DX E11.9 Yes Automatic Reconciliation, Ar   calcium carbonate 1500 (600 Ca) MG TABS tablet Take 1 tablet by mouth 2 times daily Yes Automatic Reconciliation, Ar   Cholecalciferol 50 MCG (2000 UT) TABS Take 1 tablet by mouth daily Yes Automatic Reconciliation, Ar   fluticasone (FLONASE) 50 MCG/ACT nasal spray 2 sprays by Nasal route daily Yes Automatic Reconciliation, Ar   metFORMIN (GLUCOPHAGE) 500 MG tablet Take 1 tablet by mouth 2 times daily (with meals) Yes Automatic Reconciliation, Ar       CareTeam (Including outside providers/suppliers regularly involved in providing care):   Patient Care Team:  Jolie Loredo MD as PCP - General (Internal Medicine)  Jolie Loredo MD as PCP - Empaneled Provider  Diego Stockton MD (Hematology and Oncology)  Sherman Alatorre Jr, MD (General Surgery)      Reviewed and updated this visit:  Tobacco  Allergies  Meds  Problems  Med Hx  Surg Hx  Soc Hx  Fam Hx

## 2024-10-17 NOTE — ASSESSMENT & PLAN NOTE
Taking metformin and rybelsus   BG has been decent on this regimen, high when she has held rybelsus post-op and after steroid injection for the wrist. Too soon to repeat A1c today.  On losartan for nephroprotection and atorvastatin for ASCVD risk reduction

## 2024-10-17 NOTE — ASSESSMENT & PLAN NOTE
Will meet with rad onc and med onc next month. Tentative plan for XRT and then AI. Will follow with breast surgery Q6 mo.

## 2024-10-18 ENCOUNTER — PATIENT MESSAGE (OUTPATIENT)
Age: 69
End: 2024-10-18

## 2024-10-28 ENCOUNTER — PATIENT MESSAGE (OUTPATIENT)
Age: 69
End: 2024-10-28

## 2024-10-28 DIAGNOSIS — C50.912 INVASIVE DUCTAL CARCINOMA OF BREAST, FEMALE, LEFT (HCC): Primary | ICD-10-CM

## 2024-10-28 NOTE — TELEPHONE ENCOUNTER
Has referral from Dr Alatorre in the chart. I also placed a referral. Please submit to insurance for auth.    Jolie Loredo MD

## 2024-11-06 ENCOUNTER — HOSPITAL ENCOUNTER (OUTPATIENT)
Facility: HOSPITAL | Age: 69
Discharge: HOME | End: 2024-11-09

## 2024-11-06 ENCOUNTER — CLINICAL DOCUMENTATION (OUTPATIENT)
Facility: HOSPITAL | Age: 69
End: 2024-11-06

## 2024-11-06 VITALS
BODY MASS INDEX: 35.97 KG/M2 | DIASTOLIC BLOOD PRESSURE: 90 MMHG | OXYGEN SATURATION: 94 % | HEART RATE: 82 BPM | WEIGHT: 203 LBS | RESPIRATION RATE: 16 BRPM | SYSTOLIC BLOOD PRESSURE: 160 MMHG | HEIGHT: 63 IN

## 2024-11-06 DIAGNOSIS — C50.512 MALIGNANT NEOPLASM OF LOWER-OUTER QUADRANT OF LEFT BREAST OF FEMALE, ESTROGEN RECEPTOR POSITIVE (HCC): Primary | ICD-10-CM

## 2024-11-06 DIAGNOSIS — Z17.0 MALIGNANT NEOPLASM OF LOWER-OUTER QUADRANT OF LEFT BREAST OF FEMALE, ESTROGEN RECEPTOR POSITIVE (HCC): Primary | ICD-10-CM

## 2024-11-06 ASSESSMENT — PAIN SCALES - GENERAL: PAINLEVEL_OUTOF10: 0

## 2024-11-06 NOTE — PROGRESS NOTES
[facility]  Oncology Social Work Encounter    [] Med-Onc [facility] [] Med-Onc [facility] [] Med-Onc [facility] [] Rad-Onc [facility] [x] Rad-Onc [facility] [] Rad-Onc [facility] [] Rad-Onc [facility] [] Breast Center [] [facility]      Patient: Nory Noe    Encounter Type:    [x] Initial SW Encounter  [] Patient Initiated  [] Referral  [] Distress/PHQ Screening  [] Other:      Concern(s)/Barrier(s) to Care:     Narrative: Met with patient to introduce  role and support. Patient endorsed having good support. She returned to work today. She recently went on vacation. Her main concern is side effects from treatment. Validated concerns. Provided her with [email or web address] website and number to billing office for billing questions.     Reviewed support resources with patient including [facility] center and support groups. Provided  contact information and encouraged patient to contact me as needed for ongoing psychosocial support, patient voiced understanding.    Referral/Handouts: Complimentary therapies referral  Insurance/Entitlements referral  Support Groups referral        Plan: Follow up as needed    Mary Espitia LMSW Social Work Navigator, Radiation Oncology  [facility]  [place]  W: [phone] F: [phone]

## 2024-11-06 NOTE — PROGRESS NOTES
underlying edema; no tenderness to palpation.  Back/spine: No evidence of reduced flexibility and abnormal spinal curvature.  Musculoskeletal: No evidence of bone abnormalities, joint abnormalities, compromised muscle tone and restricted range of motion.  Neurologic:No evidence of impaired cranial nerve(s), uncoordinated gait, motor impairment, a sensory deficit and impaired reflexes.  Hematologic/lymphatic: well healed left axillary incision. No cervical, supraclavicular, or axillary adenopathy       IMAGING:   Imaging was personally reviewed as detailed in the history (see above).        PATHOLOGY:   Pathology was personally reviewed as detailed in the HPI.       LABS:   All labs were personally reviewed      TIME and COUNSELIN minutes were spent with the patient,  acquiring the vital information vital to the case.  All outside records including surgical oncology and medical oncology notes, lab work/pathology, and imaging were personally reviewed.  I agree with the outside radiology impressions.  The patient was examined to verify findings as related in the HPI, as well as other areas as needed.  Time was allowed for all questions about the proposed course of care to be answered.  Greater than 50% time was spent face to face with the patient in counseling and coordination of care.         Aaron Marin MD  [facility]  [facility]  [address] [place] [address]  P: [phone]  [facility]  [address] [place] [id number]  P: [phone]  [facility]  [address] [place] [address], [address], [place] [address]  P: [phone]

## 2024-11-06 NOTE — PROGRESS NOTES
NCCN Distress Thermometer    Date Screening Completed: 11/6/24    Screening Declined:  [] Yes    Number that best describes how much distress you've experienced in the past week, including today?  0 [] - No distress 1 [x]      2 []      3 []      4 []       5 []       6 []      7 []      8 []      9 []       10 [] - Extreme distress    PROBLEM LIST  Have you had concerns about any of the items below in the past week, including today?      Physical Concerns Practical Concerns   [x] Pain [] Taking care of myself    [] Sleep [] Taking care of others    [] Fatigue [] Work   [] Tobacco use  [] School   [] Substance use  [] Housing   [] Memory or concentration [] Finances   [] Sexual health [] Insurance   [] Changes in eating  [] Transportation   [] Loss or change of physical abilities  []     [] Having enough food   Emotional Concerns [] Access to medicine   [] Worry or anxiety [] Treatment decisions   [] Sadness or depression    [] Loss of interest or enjoyment  Spiritual or Orthodox Concerns   [] Grief or loss  [] Sense of meaning or purpose   [] Fear [] Changes in radha or beliefs   [] Loneliness  [] Death, dying, or afterlife   [] Anger [] Conflict between beliefs and cancer treatments    [] Changes in appearance [] Relationship with the sacred   [] Feelings of worthlessness or being a burden [] Ritual or dietary needs        Social Concerns     [] Relationship with spouse or partner     [] Relationship with children    [] Relationship with family members     [] Relationship with friends or coworkers     [] Communication with health care team     [] Ability to have children     [] Prejudice or discrimination        Other Concerns:     Patient received resource information and education:  [x] Yes  [] No  Brief introduction with patient

## 2024-11-12 ENCOUNTER — INITIAL CONSULT (OUTPATIENT)
Age: 69
End: 2024-11-12
Payer: MEDICARE

## 2024-11-12 ENCOUNTER — OFFICE VISIT (OUTPATIENT)
Age: 69
End: 2024-11-12

## 2024-11-12 VITALS
HEIGHT: 63 IN | OXYGEN SATURATION: 98 % | TEMPERATURE: 97.2 F | SYSTOLIC BLOOD PRESSURE: 123 MMHG | HEART RATE: 99 BPM | DIASTOLIC BLOOD PRESSURE: 79 MMHG | RESPIRATION RATE: 18 BRPM | WEIGHT: 200 LBS | BODY MASS INDEX: 35.44 KG/M2

## 2024-11-12 VITALS — WEIGHT: 196 LBS | HEIGHT: 63 IN | BODY MASS INDEX: 34.73 KG/M2

## 2024-11-12 DIAGNOSIS — Z17.0 MALIGNANT NEOPLASM OF LOWER-OUTER QUADRANT OF LEFT BREAST OF FEMALE, ESTROGEN RECEPTOR POSITIVE (HCC): Primary | ICD-10-CM

## 2024-11-12 DIAGNOSIS — E11.9 TYPE 2 DIABETES MELLITUS WITHOUT COMPLICATION, WITHOUT LONG-TERM CURRENT USE OF INSULIN (HCC): ICD-10-CM

## 2024-11-12 DIAGNOSIS — Z98.890 S/P LUMPECTOMY, RIGHT BREAST: ICD-10-CM

## 2024-11-12 DIAGNOSIS — Z17.0 MALIGNANT NEOPLASM OF LEFT BREAST IN FEMALE, ESTROGEN RECEPTOR POSITIVE, UNSPECIFIED SITE OF BREAST (HCC): Primary | ICD-10-CM

## 2024-11-12 DIAGNOSIS — C50.912 MALIGNANT NEOPLASM OF LEFT BREAST IN FEMALE, ESTROGEN RECEPTOR POSITIVE, UNSPECIFIED SITE OF BREAST (HCC): Primary | ICD-10-CM

## 2024-11-12 DIAGNOSIS — C50.512 MALIGNANT NEOPLASM OF LOWER-OUTER QUADRANT OF LEFT BREAST OF FEMALE, ESTROGEN RECEPTOR POSITIVE (HCC): Primary | ICD-10-CM

## 2024-11-12 PROCEDURE — G8417 CALC BMI ABV UP PARAM F/U: HCPCS | Performed by: INTERNAL MEDICINE

## 2024-11-12 PROCEDURE — 2022F DILAT RTA XM EVC RTNOPTHY: CPT | Performed by: INTERNAL MEDICINE

## 2024-11-12 PROCEDURE — G8427 DOCREV CUR MEDS BY ELIG CLIN: HCPCS | Performed by: INTERNAL MEDICINE

## 2024-11-12 PROCEDURE — 1090F PRES/ABSN URINE INCON ASSESS: CPT | Performed by: INTERNAL MEDICINE

## 2024-11-12 PROCEDURE — 99214 OFFICE O/P EST MOD 30 MIN: CPT | Performed by: INTERNAL MEDICINE

## 2024-11-12 PROCEDURE — G2211 COMPLEX E/M VISIT ADD ON: HCPCS | Performed by: INTERNAL MEDICINE

## 2024-11-12 PROCEDURE — 99024 POSTOP FOLLOW-UP VISIT: CPT | Performed by: NURSE PRACTITIONER

## 2024-11-12 PROCEDURE — 99204 OFFICE O/P NEW MOD 45 MIN: CPT | Performed by: INTERNAL MEDICINE

## 2024-11-12 PROCEDURE — G8482 FLU IMMUNIZE ORDER/ADMIN: HCPCS | Performed by: INTERNAL MEDICINE

## 2024-11-12 RX ORDER — ANASTROZOLE 1 MG/1
1 TABLET ORAL DAILY
Qty: 90 TABLET | Refills: 3 | Status: SHIPPED | OUTPATIENT
Start: 2024-11-12

## 2024-11-12 RX ORDER — CLINDAMYCIN HYDROCHLORIDE 300 MG/1
300 CAPSULE ORAL 3 TIMES DAILY
Qty: 21 CAPSULE | Refills: 0 | Status: SHIPPED | OUTPATIENT
Start: 2024-11-12 | End: 2024-11-19

## 2024-11-12 ASSESSMENT — PATIENT HEALTH QUESTIONNAIRE - PHQ9
SUM OF ALL RESPONSES TO PHQ QUESTIONS 1-9: 0
SUM OF ALL RESPONSES TO PHQ QUESTIONS 1-9: 0
2. FEELING DOWN, DEPRESSED OR HOPELESS: NOT AT ALL
1. LITTLE INTEREST OR PLEASURE IN DOING THINGS: NOT AT ALL
SUM OF ALL RESPONSES TO PHQ QUESTIONS 1-9: 0
SUM OF ALL RESPONSES TO PHQ9 QUESTIONS 1 & 2: 0
SUM OF ALL RESPONSES TO PHQ QUESTIONS 1-9: 0

## 2024-11-12 NOTE — PROGRESS NOTES
HISTORY OF PRESENT ILLNESS  Nory Noe is a 69 y.o. female     HPI Established patient presents for a post-op visit.        Breast history -   Referring - Dr. Loredo  2024: LEFT breast biopsy, 5-6:00 -   PATH: IDC, ER+(100%)/MN+(100%)/HER-2-, Ki-67 7%, favor histologic grade 2, 7.0mm largest invasive tumor focus in 1 tissue core, DCIS, ER+(100%)/MN+(100%), nuclear grade 1-2, solid and cribriform types.   MammaPrint: Low Risk, Luminal A-Type, +0.242   24: LEFT breast, bx PATH: DCIS, nuclear gr 2, solid patterns. ER+(100%)/MN+(100%).   24: LEFT breast reduction lumpectomy and LEFT SLNBx (with Dr. Carter) - Dr. Alatorre  - LEFT axillary sentinel node #1, biopsy: One lymph node, positive for micrometastatic carcinoma (1/1).   - LEFT axillary sentinel node #2 and #3, biopsy: Two LNs, negative for metastatic carcinoma (0/2).   - LEFT breast, lumpectomy: IDC, overall grade 2, 25mm at greatest dimension. DCIS present. Negative margins. Pathologic stage: pT2, pN1mi (sn), ER+/MN+/HER2-, Ki-67 7%.   - LEFT breast skin, excision: Benign skin. Negative for invasive or in situ carcinoma.   - LEFT and RIGHT breast tissue, excision: Benign skin and subcutaneous tissue. Negative for invasive or in situ carcinoma.   10/3/24: LEFT breast, superior margin, excision - Dr. Alatorre  Intraductal papilloma. ADH with focal intraductal squamous metaplasia. No evidence for malignancy.       Family history -   Father - prostate,melanoma, lung cancer with mets to brain.  at age 83  Paternal cousin - breast cancer in her 50's and survived.  Paternal cousin - colon cancer in his 60's and survived.  Paternal grandfather - lung cancer  Brother - Non Hodgkin's lymphoma survived.   Maternal uncle - lung cancer  at age 80.  Paternal cousin - melanoma and survived. Age unknown      Review of Systems      Physical Exam  Chest:                BREAST ULTRASOUND  Indication: LEFT breast 6:00  Technique: The area was

## 2024-11-12 NOTE — PROGRESS NOTES
Cancer [place] at [facility]  [address] [address], [address] [facility] [address]  W: [phone]  F: [phone]      Reason for Visit:   Nory Noe is a 69 y.o. female who is seen in consultation at the request of Dr. Alatorre for evaluation of therapy for breast cancer.    Treatment History:   5/14/24 left breast 5-6:00 core bx:  IDC, gr 2, 7 mm, ER + at 100%, IA + at 100%, HER 2 negative at IHC 1+, ki67 7%; DCIS, gr 1-2, solid and cribriform, ER + at 100%, IA + at 100%  Mammaprint shows low risk luminal A, index + 0.242  6/27/24 left breast core bx:  DCIS, gr 2, solid, ER + at 100%, IA + at 100%  9/5/24 left breast lumpectomy:  IDC, 2.5 cm, gr 2, 1/3 LN involved, micromet, 0.6 mm, no GEORGE, no LVI, margin + for DCIS, pT2 pN1mi cM0  10/3/24 left breast superior margin re-ex:  ADH, margins negative    History of Present Illness:   An abnormal mammogram led to the path above    FH:  father with prostate cancer, melanoma; paternal cousin with breast cancer; paternal cousin with melanoma; no ovarian or pancreas cancer    Past Medical History:   Diagnosis Date    Acquired absence of both cervix and uterus 4/5/2012    Breast cancer (HCC)     LEFT    Diabetes (HCC)     Hx of bone density study 11/25/2015    Osteopenia    Hx of mammogram 2/25/13    Negative    Hypercholesterolemia     PT STATES SHE ONLY TAKES MEDS TO KEEP HER CHOLESTEROL LOW DUE TO HER DIABETES    Hypertension     PT STATES SHE ONLY TAKES BP MEDS TO KEEP HER BP LOW DUE TO HER DIABETES    Obesity     Long time    Osteoarthritis Several years    Osteopenia 2015,11/11    Mild    Other specified hypothyroidism 03/05/2024    Transient hypothyroidism requiring levothyroxine      Pap smear for cervical cancer screening 5/8/06    Normal Pap    Symptomatic menopausal or female climacteric states 4/5/2012    Thyroid disease     THYROID NODULES, JUST TAKEN OFF THYROID MEDICATION AND LABS WNL PER PT    Type 2 diabetes mellitus without

## 2024-11-13 ENCOUNTER — HOSPITAL ENCOUNTER (OUTPATIENT)
Facility: HOSPITAL | Age: 69
Discharge: HOME | End: 2024-11-16
Attending: RADIOLOGY

## 2024-12-01 ENCOUNTER — PATIENT MESSAGE (OUTPATIENT)
Age: 69
End: 2024-12-01

## 2024-12-01 DIAGNOSIS — E11.9 CONTROLLED TYPE 2 DIABETES MELLITUS WITHOUT COMPLICATION, WITHOUT LONG-TERM CURRENT USE OF INSULIN (HCC): Primary | ICD-10-CM

## 2024-12-01 RX ORDER — ORAL SEMAGLUTIDE 7 MG/1
1 TABLET ORAL
Qty: 90 TABLET | Refills: 1 | Status: CANCELLED | OUTPATIENT
Start: 2024-12-01

## 2024-12-02 ENCOUNTER — HOSPITAL ENCOUNTER (OUTPATIENT)
Facility: HOSPITAL | Age: 69
Discharge: HOME | End: 2024-12-05
Attending: RADIOLOGY

## 2024-12-02 DIAGNOSIS — C50.512 MALIGNANT NEOPLASM OF LOWER-OUTER QUADRANT OF LEFT BREAST OF FEMALE, ESTROGEN RECEPTOR POSITIVE (HCC): Primary | ICD-10-CM

## 2024-12-02 DIAGNOSIS — Z17.0 MALIGNANT NEOPLASM OF LOWER-OUTER QUADRANT OF LEFT BREAST OF FEMALE, ESTROGEN RECEPTOR POSITIVE (HCC): Primary | ICD-10-CM

## 2024-12-02 LAB
RAD ONC ARIA COURSE FIRST TREATMENT DATE: NORMAL
RAD ONC ARIA COURSE ID: NORMAL
RAD ONC ARIA COURSE INTENT: NORMAL
RAD ONC ARIA COURSE LAST TREATMENT DATE: NORMAL
RAD ONC ARIA COURSE SESSION NUMBER: 1
RAD ONC ARIA COURSE START DATE: NORMAL
RAD ONC ARIA COURSE TREATMENT ELAPSED DAYS: 0
RAD ONC ARIA PLAN FRACTIONS TREATED TO DATE: 1
RAD ONC ARIA PLAN ID: NORMAL
RAD ONC ARIA PLAN PRESCRIBED DOSE PER FRACTION: 2.67 GY
RAD ONC ARIA PLAN PRIMARY REFERENCE POINT: NORMAL
RAD ONC ARIA PLAN TOTAL FRACTIONS PRESCRIBED: 15
RAD ONC ARIA PLAN TOTAL PRESCRIBED DOSE: 4005 CGY
RAD ONC ARIA REFERENCE POINT DOSAGE GIVEN TO DATE: 2.61 GY
RAD ONC ARIA REFERENCE POINT DOSAGE GIVEN TO DATE: 2.67 GY
RAD ONC ARIA REFERENCE POINT ID: NORMAL
RAD ONC ARIA REFERENCE POINT ID: NORMAL
RAD ONC ARIA REFERENCE POINT SESSION DOSAGE GIVEN: 2.61 GY
RAD ONC ARIA REFERENCE POINT SESSION DOSAGE GIVEN: 2.67 GY

## 2024-12-02 RX ORDER — LOSARTAN POTASSIUM 25 MG/1
25 TABLET ORAL DAILY
Qty: 90 TABLET | Refills: 2 | OUTPATIENT
Start: 2024-12-02

## 2024-12-02 RX ORDER — ORAL SEMAGLUTIDE 7 MG/1
1 TABLET ORAL
Qty: 90 TABLET | Refills: 1 | Status: SHIPPED | OUTPATIENT
Start: 2024-12-02

## 2024-12-02 RX ORDER — LOSARTAN POTASSIUM 25 MG/1
25 TABLET ORAL DAILY
Qty: 90 TABLET | Refills: 1 | Status: SHIPPED | OUTPATIENT
Start: 2024-12-02

## 2024-12-02 NOTE — PROGRESS NOTES
Cancer [place] at [facility]  [facility]      RADIATION ONCOLOGY WEEKLY PROGRESS NOTE    Encounter Date: 12/02/24   Patient Name: Nory Noe  YOB: 1955  Medical Record Number: [id number]    DIAGNOSIS:       ICD-10-CM    1. Malignant neoplasm of lower-outer quadrant of left breast of female, estrogen receptor positive (HCC)  C50.512     Z17.0         STAGING:    Cancer Staging   Malignant neoplasm of lower-outer quadrant of left breast of female, estrogen receptor positive (HCC)  Staging form: Breast, AJCC 8th Edition  - Pathologic stage from 9/5/2024: Stage IB (pT2, pN1mi(sn), cM0, G2, ER+, NE+, HER2-) - Signed by Ad Marin MD on 11/6/2024  - Clinical stage from 5/14/2024: Stage IA (cT1c, cN0, cM0, G2, ER+, NE+, HER2-) - Signed by Ad Marin MD on 11/6/2024  AJCC Staging has been reviewed    ASSESSMENT:   70 yo F with a cT1cN0 LEFT breast IDC, ER+/NE+/HER2 neg status post lumpectomy/SLNB ([id number], +DCIS margin, grade 2) followed by re-excision (neg margins).       TREATMENT DETAILS:     Treatment Site Dose/Fx (cGy) #Fx Current Dose (cGy) Total Planned Dose (cGy) Start Date End Date   Left breast 267 1/15 971 3870 12/2/24 On going     No concurrent systemic therapy    SUBJECTIVE   Ms. Noe is a 69 y.o. female seen today for her weekly on-treatment evaluation    12/2/24:  First OTV, at fraction 1, no issues.  Had question about getting cortisone shot for tendinitis in her wrist, which is fine.  Went over RT schedule and answered questions.      OBJECTIVE/PHYSICAL EXAM:   VITAL SIGNS: There were no vitals taken for this visit.  Wt Readings from Last 5 Encounters:   11/12/24 90.7 kg (200 lb)   11/12/24 88.9 kg (196 lb)   11/06/24 92.1 kg (203 lb)   10/17/24 92.4 kg (203 lb 12.8 oz)   10/16/24 91.2 kg (201 lb)      Pain Score:   0 - No pain   Performance status:  ECOG 0, fully active, able to carry on all predisease activities without

## 2024-12-02 NOTE — TELEPHONE ENCOUNTER
Pt last seen on 10/17/24. Due to return in 6 months.    Has appt on 4/23/25.    Rx last filled on 3/20/24 #90/2RF.    Rx sent to pharmacy as #90/1RF and verified by Verbal Order Read Back with provider.

## 2024-12-03 ENCOUNTER — HOSPITAL ENCOUNTER (OUTPATIENT)
Facility: HOSPITAL | Age: 69
Discharge: HOME | End: 2024-12-06
Attending: RADIOLOGY

## 2024-12-03 LAB
RAD ONC ARIA COURSE FIRST TREATMENT DATE: NORMAL
RAD ONC ARIA COURSE ID: NORMAL
RAD ONC ARIA COURSE INTENT: NORMAL
RAD ONC ARIA COURSE LAST TREATMENT DATE: NORMAL
RAD ONC ARIA COURSE SESSION NUMBER: 2
RAD ONC ARIA COURSE START DATE: NORMAL
RAD ONC ARIA COURSE TREATMENT ELAPSED DAYS: 1
RAD ONC ARIA PLAN FRACTIONS TREATED TO DATE: 2
RAD ONC ARIA PLAN ID: NORMAL
RAD ONC ARIA PLAN PRESCRIBED DOSE PER FRACTION: 2.67 GY
RAD ONC ARIA PLAN PRIMARY REFERENCE POINT: NORMAL
RAD ONC ARIA PLAN TOTAL FRACTIONS PRESCRIBED: 15
RAD ONC ARIA PLAN TOTAL PRESCRIBED DOSE: 4005 CGY
RAD ONC ARIA REFERENCE POINT DOSAGE GIVEN TO DATE: 5.21 GY
RAD ONC ARIA REFERENCE POINT DOSAGE GIVEN TO DATE: 5.34 GY
RAD ONC ARIA REFERENCE POINT ID: NORMAL
RAD ONC ARIA REFERENCE POINT ID: NORMAL
RAD ONC ARIA REFERENCE POINT SESSION DOSAGE GIVEN: 2.61 GY
RAD ONC ARIA REFERENCE POINT SESSION DOSAGE GIVEN: 2.67 GY

## 2024-12-04 ENCOUNTER — HOSPITAL ENCOUNTER (OUTPATIENT)
Facility: HOSPITAL | Age: 69
Discharge: HOME | End: 2024-12-07
Attending: RADIOLOGY

## 2024-12-04 LAB
RAD ONC ARIA COURSE FIRST TREATMENT DATE: NORMAL
RAD ONC ARIA COURSE ID: NORMAL
RAD ONC ARIA COURSE INTENT: NORMAL
RAD ONC ARIA COURSE LAST TREATMENT DATE: NORMAL
RAD ONC ARIA COURSE SESSION NUMBER: 3
RAD ONC ARIA COURSE START DATE: NORMAL
RAD ONC ARIA COURSE TREATMENT ELAPSED DAYS: 2
RAD ONC ARIA PLAN FRACTIONS TREATED TO DATE: 3
RAD ONC ARIA PLAN ID: NORMAL
RAD ONC ARIA PLAN PRESCRIBED DOSE PER FRACTION: 2.67 GY
RAD ONC ARIA PLAN PRIMARY REFERENCE POINT: NORMAL
RAD ONC ARIA PLAN TOTAL FRACTIONS PRESCRIBED: 15
RAD ONC ARIA PLAN TOTAL PRESCRIBED DOSE: 4005 CGY
RAD ONC ARIA REFERENCE POINT DOSAGE GIVEN TO DATE: 7.82 GY
RAD ONC ARIA REFERENCE POINT DOSAGE GIVEN TO DATE: 8.01 GY
RAD ONC ARIA REFERENCE POINT ID: NORMAL
RAD ONC ARIA REFERENCE POINT ID: NORMAL
RAD ONC ARIA REFERENCE POINT SESSION DOSAGE GIVEN: 2.61 GY
RAD ONC ARIA REFERENCE POINT SESSION DOSAGE GIVEN: 2.67 GY

## 2024-12-06 ENCOUNTER — HOSPITAL ENCOUNTER (OUTPATIENT)
Facility: HOSPITAL | Age: 69
Discharge: HOME | End: 2024-12-09
Attending: RADIOLOGY

## 2024-12-06 LAB
RAD ONC ARIA COURSE FIRST TREATMENT DATE: NORMAL
RAD ONC ARIA COURSE ID: NORMAL
RAD ONC ARIA COURSE INTENT: NORMAL
RAD ONC ARIA COURSE LAST TREATMENT DATE: NORMAL
RAD ONC ARIA COURSE SESSION NUMBER: 4
RAD ONC ARIA COURSE START DATE: NORMAL
RAD ONC ARIA COURSE TREATMENT ELAPSED DAYS: 4
RAD ONC ARIA PLAN FRACTIONS TREATED TO DATE: 4
RAD ONC ARIA PLAN ID: NORMAL
RAD ONC ARIA PLAN PRESCRIBED DOSE PER FRACTION: 2.67 GY
RAD ONC ARIA PLAN PRIMARY REFERENCE POINT: NORMAL
RAD ONC ARIA PLAN TOTAL FRACTIONS PRESCRIBED: 15
RAD ONC ARIA PLAN TOTAL PRESCRIBED DOSE: 4005 CGY
RAD ONC ARIA REFERENCE POINT DOSAGE GIVEN TO DATE: 10.42 GY
RAD ONC ARIA REFERENCE POINT DOSAGE GIVEN TO DATE: 10.68 GY
RAD ONC ARIA REFERENCE POINT ID: NORMAL
RAD ONC ARIA REFERENCE POINT ID: NORMAL
RAD ONC ARIA REFERENCE POINT SESSION DOSAGE GIVEN: 2.61 GY
RAD ONC ARIA REFERENCE POINT SESSION DOSAGE GIVEN: 2.67 GY

## 2024-12-09 ENCOUNTER — HOSPITAL ENCOUNTER (OUTPATIENT)
Facility: HOSPITAL | Age: 69
Discharge: HOME | End: 2024-12-12
Attending: RADIOLOGY

## 2024-12-09 DIAGNOSIS — Z17.0 MALIGNANT NEOPLASM OF LOWER-OUTER QUADRANT OF LEFT BREAST OF FEMALE, ESTROGEN RECEPTOR POSITIVE (HCC): Primary | ICD-10-CM

## 2024-12-09 DIAGNOSIS — C50.512 MALIGNANT NEOPLASM OF LOWER-OUTER QUADRANT OF LEFT BREAST OF FEMALE, ESTROGEN RECEPTOR POSITIVE (HCC): Primary | ICD-10-CM

## 2024-12-09 LAB
RAD ONC ARIA COURSE FIRST TREATMENT DATE: NORMAL
RAD ONC ARIA COURSE ID: NORMAL
RAD ONC ARIA COURSE INTENT: NORMAL
RAD ONC ARIA COURSE LAST TREATMENT DATE: NORMAL
RAD ONC ARIA COURSE SESSION NUMBER: 5
RAD ONC ARIA COURSE START DATE: NORMAL
RAD ONC ARIA COURSE TREATMENT ELAPSED DAYS: 7
RAD ONC ARIA PLAN FRACTIONS TREATED TO DATE: 5
RAD ONC ARIA PLAN ID: NORMAL
RAD ONC ARIA PLAN PRESCRIBED DOSE PER FRACTION: 2.67 GY
RAD ONC ARIA PLAN PRIMARY REFERENCE POINT: NORMAL
RAD ONC ARIA PLAN TOTAL FRACTIONS PRESCRIBED: 15
RAD ONC ARIA PLAN TOTAL PRESCRIBED DOSE: 4005 CGY
RAD ONC ARIA REFERENCE POINT DOSAGE GIVEN TO DATE: 13.03 GY
RAD ONC ARIA REFERENCE POINT DOSAGE GIVEN TO DATE: 13.35 GY
RAD ONC ARIA REFERENCE POINT ID: NORMAL
RAD ONC ARIA REFERENCE POINT ID: NORMAL
RAD ONC ARIA REFERENCE POINT SESSION DOSAGE GIVEN: 2.61 GY
RAD ONC ARIA REFERENCE POINT SESSION DOSAGE GIVEN: 2.67 GY

## 2024-12-09 ASSESSMENT — PAIN SCALES - GENERAL: PAINLEVEL_OUTOF10: 0

## 2024-12-09 NOTE — PROGRESS NOTES
Cancer [place] at [facility]  [facility]      RADIATION ONCOLOGY WEEKLY PROGRESS NOTE    Encounter Date: 12/09/24   Patient Name: Nory Noe  YOB: 1955  Medical Record Number: [id number]    DIAGNOSIS:       ICD-10-CM    1. Malignant neoplasm of lower-outer quadrant of left breast of female, estrogen receptor positive (HCC)  C50.512     Z17.0         STAGING:    Cancer Staging   Malignant neoplasm of lower-outer quadrant of left breast of female, estrogen receptor positive (HCC)  Staging form: Breast, AJCC 8th Edition  - Pathologic stage from 9/5/2024: Stage IB (pT2, pN1mi(sn), cM0, G2, ER+, CA+, HER2-) - Signed by Ad Marin MD on 11/6/2024  - Clinical stage from 5/14/2024: Stage IA (cT1c, cN0, cM0, G2, ER+, CA+, HER2-) - Signed by Ad Marin MD on 11/6/2024  AJCC Staging has been reviewed    ASSESSMENT:   70 yo F with a cT1cN0 LEFT breast IDC, ER+/CA+/HER2 neg status post lumpectomy/SLNB ([id number], +DCIS margin, grade 2) followed by re-excision (neg margins).       TREATMENT DETAILS:     Treatment Site Dose/Fx (cGy) #Fx Current Dose (cGy) Total Planned Dose (cGy) Start Date End Date   Left breast 267 5/15 2870 2052 12/2/24 On going     No concurrent systemic therapy    SUBJECTIVE   Ms. Noe is a 69 y.o. female seen today for her weekly on-treatment evaluation    12/2/24:  First OTV, at fraction 1, no issues.  Had question about getting cortisone shot for tendinitis in her wrist, which is fine.  Went over RT schedule and answered questions.  12/9/24: at fraction 5, doing well today, only noticing a tingling sensation over the left breast without dermatitis changes or pruritus. Using moisturizers appropriately.    OBJECTIVE/PHYSICAL EXAM:   VITAL SIGNS: There were no vitals taken for this visit.  Wt Readings from Last 5 Encounters:   11/12/24 90.7 kg (200 lb)   11/12/24 88.9 kg (196 lb)   11/06/24 92.1 kg (203 lb)   10/17/24 92.4 kg (203

## 2024-12-10 ENCOUNTER — HOSPITAL ENCOUNTER (OUTPATIENT)
Facility: HOSPITAL | Age: 69
Discharge: HOME | End: 2024-12-13
Attending: RADIOLOGY

## 2024-12-10 LAB
RAD ONC ARIA COURSE FIRST TREATMENT DATE: NORMAL
RAD ONC ARIA COURSE ID: NORMAL
RAD ONC ARIA COURSE INTENT: NORMAL
RAD ONC ARIA COURSE LAST TREATMENT DATE: NORMAL
RAD ONC ARIA COURSE SESSION NUMBER: 6
RAD ONC ARIA COURSE START DATE: NORMAL
RAD ONC ARIA COURSE TREATMENT ELAPSED DAYS: 8
RAD ONC ARIA PLAN FRACTIONS TREATED TO DATE: 6
RAD ONC ARIA PLAN ID: NORMAL
RAD ONC ARIA PLAN PRESCRIBED DOSE PER FRACTION: 2.67 GY
RAD ONC ARIA PLAN PRIMARY REFERENCE POINT: NORMAL
RAD ONC ARIA PLAN TOTAL FRACTIONS PRESCRIBED: 15
RAD ONC ARIA PLAN TOTAL PRESCRIBED DOSE: 4005 CGY
RAD ONC ARIA REFERENCE POINT DOSAGE GIVEN TO DATE: 15.64 GY
RAD ONC ARIA REFERENCE POINT DOSAGE GIVEN TO DATE: 16.02 GY
RAD ONC ARIA REFERENCE POINT ID: NORMAL
RAD ONC ARIA REFERENCE POINT ID: NORMAL
RAD ONC ARIA REFERENCE POINT SESSION DOSAGE GIVEN: 2.61 GY
RAD ONC ARIA REFERENCE POINT SESSION DOSAGE GIVEN: 2.67 GY

## 2024-12-11 ENCOUNTER — HOSPITAL ENCOUNTER (OUTPATIENT)
Facility: HOSPITAL | Age: 69
Discharge: HOME | End: 2024-12-14
Attending: RADIOLOGY

## 2024-12-11 LAB
RAD ONC ARIA COURSE FIRST TREATMENT DATE: NORMAL
RAD ONC ARIA COURSE ID: NORMAL
RAD ONC ARIA COURSE INTENT: NORMAL
RAD ONC ARIA COURSE LAST TREATMENT DATE: NORMAL
RAD ONC ARIA COURSE SESSION NUMBER: 7
RAD ONC ARIA COURSE START DATE: NORMAL
RAD ONC ARIA COURSE TREATMENT ELAPSED DAYS: 9
RAD ONC ARIA PLAN FRACTIONS TREATED TO DATE: 7
RAD ONC ARIA PLAN ID: NORMAL
RAD ONC ARIA PLAN PRESCRIBED DOSE PER FRACTION: 2.67 GY
RAD ONC ARIA PLAN PRIMARY REFERENCE POINT: NORMAL
RAD ONC ARIA PLAN TOTAL FRACTIONS PRESCRIBED: 15
RAD ONC ARIA PLAN TOTAL PRESCRIBED DOSE: 4005 CGY
RAD ONC ARIA REFERENCE POINT DOSAGE GIVEN TO DATE: 18.24 GY
RAD ONC ARIA REFERENCE POINT DOSAGE GIVEN TO DATE: 18.69 GY
RAD ONC ARIA REFERENCE POINT ID: NORMAL
RAD ONC ARIA REFERENCE POINT ID: NORMAL
RAD ONC ARIA REFERENCE POINT SESSION DOSAGE GIVEN: 2.61 GY
RAD ONC ARIA REFERENCE POINT SESSION DOSAGE GIVEN: 2.67 GY

## 2024-12-12 ENCOUNTER — HOSPITAL ENCOUNTER (OUTPATIENT)
Facility: HOSPITAL | Age: 69
Discharge: HOME | End: 2024-12-15
Attending: RADIOLOGY

## 2024-12-12 LAB
RAD ONC ARIA COURSE FIRST TREATMENT DATE: NORMAL
RAD ONC ARIA COURSE ID: NORMAL
RAD ONC ARIA COURSE INTENT: NORMAL
RAD ONC ARIA COURSE LAST TREATMENT DATE: NORMAL
RAD ONC ARIA COURSE SESSION NUMBER: 8
RAD ONC ARIA COURSE START DATE: NORMAL
RAD ONC ARIA COURSE TREATMENT ELAPSED DAYS: 10
RAD ONC ARIA PLAN FRACTIONS TREATED TO DATE: 8
RAD ONC ARIA PLAN ID: NORMAL
RAD ONC ARIA PLAN PRESCRIBED DOSE PER FRACTION: 2.67 GY
RAD ONC ARIA PLAN PRIMARY REFERENCE POINT: NORMAL
RAD ONC ARIA PLAN TOTAL FRACTIONS PRESCRIBED: 15
RAD ONC ARIA PLAN TOTAL PRESCRIBED DOSE: 4005 CGY
RAD ONC ARIA REFERENCE POINT DOSAGE GIVEN TO DATE: 20.85 GY
RAD ONC ARIA REFERENCE POINT DOSAGE GIVEN TO DATE: 21.36 GY
RAD ONC ARIA REFERENCE POINT ID: NORMAL
RAD ONC ARIA REFERENCE POINT ID: NORMAL
RAD ONC ARIA REFERENCE POINT SESSION DOSAGE GIVEN: 2.61 GY
RAD ONC ARIA REFERENCE POINT SESSION DOSAGE GIVEN: 2.67 GY

## 2024-12-13 ENCOUNTER — HOSPITAL ENCOUNTER (OUTPATIENT)
Facility: HOSPITAL | Age: 69
Discharge: HOME | End: 2024-12-16
Attending: RADIOLOGY

## 2024-12-13 LAB
RAD ONC ARIA COURSE FIRST TREATMENT DATE: NORMAL
RAD ONC ARIA COURSE ID: NORMAL
RAD ONC ARIA COURSE INTENT: NORMAL
RAD ONC ARIA COURSE LAST TREATMENT DATE: NORMAL
RAD ONC ARIA COURSE SESSION NUMBER: 9
RAD ONC ARIA COURSE START DATE: NORMAL
RAD ONC ARIA COURSE TREATMENT ELAPSED DAYS: 11
RAD ONC ARIA PLAN FRACTIONS TREATED TO DATE: 9
RAD ONC ARIA PLAN ID: NORMAL
RAD ONC ARIA PLAN PRESCRIBED DOSE PER FRACTION: 2.67 GY
RAD ONC ARIA PLAN PRIMARY REFERENCE POINT: NORMAL
RAD ONC ARIA PLAN TOTAL FRACTIONS PRESCRIBED: 15
RAD ONC ARIA PLAN TOTAL PRESCRIBED DOSE: 4005 CGY
RAD ONC ARIA REFERENCE POINT DOSAGE GIVEN TO DATE: 23.45 GY
RAD ONC ARIA REFERENCE POINT DOSAGE GIVEN TO DATE: 24.03 GY
RAD ONC ARIA REFERENCE POINT ID: NORMAL
RAD ONC ARIA REFERENCE POINT ID: NORMAL
RAD ONC ARIA REFERENCE POINT SESSION DOSAGE GIVEN: 2.61 GY
RAD ONC ARIA REFERENCE POINT SESSION DOSAGE GIVEN: 2.67 GY

## 2024-12-16 ENCOUNTER — HOSPITAL ENCOUNTER (OUTPATIENT)
Facility: HOSPITAL | Age: 69
Discharge: HOME | End: 2024-12-19
Attending: RADIOLOGY

## 2024-12-16 DIAGNOSIS — C50.512 MALIGNANT NEOPLASM OF LOWER-OUTER QUADRANT OF LEFT BREAST OF FEMALE, ESTROGEN RECEPTOR POSITIVE (HCC): Primary | ICD-10-CM

## 2024-12-16 DIAGNOSIS — Z17.0 MALIGNANT NEOPLASM OF LOWER-OUTER QUADRANT OF LEFT BREAST OF FEMALE, ESTROGEN RECEPTOR POSITIVE (HCC): Primary | ICD-10-CM

## 2024-12-16 LAB
RAD ONC ARIA COURSE FIRST TREATMENT DATE: NORMAL
RAD ONC ARIA COURSE ID: NORMAL
RAD ONC ARIA COURSE INTENT: NORMAL
RAD ONC ARIA COURSE LAST TREATMENT DATE: NORMAL
RAD ONC ARIA COURSE SESSION NUMBER: 10
RAD ONC ARIA COURSE START DATE: NORMAL
RAD ONC ARIA COURSE TREATMENT ELAPSED DAYS: 14
RAD ONC ARIA PLAN FRACTIONS TREATED TO DATE: 10
RAD ONC ARIA PLAN ID: NORMAL
RAD ONC ARIA PLAN PRESCRIBED DOSE PER FRACTION: 2.67 GY
RAD ONC ARIA PLAN PRIMARY REFERENCE POINT: NORMAL
RAD ONC ARIA PLAN TOTAL FRACTIONS PRESCRIBED: 15
RAD ONC ARIA PLAN TOTAL PRESCRIBED DOSE: 4005 CGY
RAD ONC ARIA REFERENCE POINT DOSAGE GIVEN TO DATE: 26.06 GY
RAD ONC ARIA REFERENCE POINT DOSAGE GIVEN TO DATE: 26.7 GY
RAD ONC ARIA REFERENCE POINT ID: NORMAL
RAD ONC ARIA REFERENCE POINT ID: NORMAL
RAD ONC ARIA REFERENCE POINT SESSION DOSAGE GIVEN: 2.61 GY
RAD ONC ARIA REFERENCE POINT SESSION DOSAGE GIVEN: 2.67 GY

## 2024-12-16 NOTE — PROGRESS NOTES
Ad Marin MD  [facility]  [facility]  [address] [place] [address]  P: [phone]  [facility]  [address] [place] [address]  P: [phone]  [facility]  [address], [address], [place] [address]  P: [phone]

## 2024-12-17 ENCOUNTER — HOSPITAL ENCOUNTER (OUTPATIENT)
Facility: HOSPITAL | Age: 69
Discharge: HOME | End: 2024-12-20
Attending: RADIOLOGY

## 2024-12-17 LAB
RAD ONC ARIA COURSE FIRST TREATMENT DATE: NORMAL
RAD ONC ARIA COURSE ID: NORMAL
RAD ONC ARIA COURSE INTENT: NORMAL
RAD ONC ARIA COURSE LAST TREATMENT DATE: NORMAL
RAD ONC ARIA COURSE SESSION NUMBER: 11
RAD ONC ARIA COURSE START DATE: NORMAL
RAD ONC ARIA COURSE TREATMENT ELAPSED DAYS: 15
RAD ONC ARIA PLAN FRACTIONS TREATED TO DATE: 11
RAD ONC ARIA PLAN ID: NORMAL
RAD ONC ARIA PLAN PRESCRIBED DOSE PER FRACTION: 2.67 GY
RAD ONC ARIA PLAN PRIMARY REFERENCE POINT: NORMAL
RAD ONC ARIA PLAN TOTAL FRACTIONS PRESCRIBED: 15
RAD ONC ARIA PLAN TOTAL PRESCRIBED DOSE: 4005 CGY
RAD ONC ARIA REFERENCE POINT DOSAGE GIVEN TO DATE: 28.67 GY
RAD ONC ARIA REFERENCE POINT DOSAGE GIVEN TO DATE: 29.37 GY
RAD ONC ARIA REFERENCE POINT ID: NORMAL
RAD ONC ARIA REFERENCE POINT ID: NORMAL
RAD ONC ARIA REFERENCE POINT SESSION DOSAGE GIVEN: 2.61 GY
RAD ONC ARIA REFERENCE POINT SESSION DOSAGE GIVEN: 2.67 GY

## 2024-12-18 ENCOUNTER — HOSPITAL ENCOUNTER (OUTPATIENT)
Facility: HOSPITAL | Age: 69
Discharge: HOME | End: 2024-12-21
Attending: RADIOLOGY

## 2024-12-18 LAB
RAD ONC ARIA COURSE FIRST TREATMENT DATE: NORMAL
RAD ONC ARIA COURSE ID: NORMAL
RAD ONC ARIA COURSE INTENT: NORMAL
RAD ONC ARIA COURSE LAST TREATMENT DATE: NORMAL
RAD ONC ARIA COURSE SESSION NUMBER: 12
RAD ONC ARIA COURSE START DATE: NORMAL
RAD ONC ARIA COURSE TREATMENT ELAPSED DAYS: 16
RAD ONC ARIA PLAN FRACTIONS TREATED TO DATE: 12
RAD ONC ARIA PLAN ID: NORMAL
RAD ONC ARIA PLAN PRESCRIBED DOSE PER FRACTION: 2.67 GY
RAD ONC ARIA PLAN PRIMARY REFERENCE POINT: NORMAL
RAD ONC ARIA PLAN TOTAL FRACTIONS PRESCRIBED: 15
RAD ONC ARIA PLAN TOTAL PRESCRIBED DOSE: 4005 CGY
RAD ONC ARIA REFERENCE POINT DOSAGE GIVEN TO DATE: 31.27 GY
RAD ONC ARIA REFERENCE POINT DOSAGE GIVEN TO DATE: 32.04 GY
RAD ONC ARIA REFERENCE POINT ID: NORMAL
RAD ONC ARIA REFERENCE POINT ID: NORMAL
RAD ONC ARIA REFERENCE POINT SESSION DOSAGE GIVEN: 2.61 GY
RAD ONC ARIA REFERENCE POINT SESSION DOSAGE GIVEN: 2.67 GY

## 2024-12-19 ENCOUNTER — HOSPITAL ENCOUNTER (OUTPATIENT)
Facility: HOSPITAL | Age: 69
Discharge: HOME | End: 2024-12-22
Attending: RADIOLOGY

## 2024-12-19 LAB
RAD ONC ARIA COURSE FIRST TREATMENT DATE: NORMAL
RAD ONC ARIA COURSE ID: NORMAL
RAD ONC ARIA COURSE INTENT: NORMAL
RAD ONC ARIA COURSE LAST TREATMENT DATE: NORMAL
RAD ONC ARIA COURSE SESSION NUMBER: 13
RAD ONC ARIA COURSE START DATE: NORMAL
RAD ONC ARIA COURSE TREATMENT ELAPSED DAYS: 17
RAD ONC ARIA PLAN FRACTIONS TREATED TO DATE: 13
RAD ONC ARIA PLAN ID: NORMAL
RAD ONC ARIA PLAN PRESCRIBED DOSE PER FRACTION: 2.67 GY
RAD ONC ARIA PLAN PRIMARY REFERENCE POINT: NORMAL
RAD ONC ARIA PLAN TOTAL FRACTIONS PRESCRIBED: 15
RAD ONC ARIA PLAN TOTAL PRESCRIBED DOSE: 4005 CGY
RAD ONC ARIA REFERENCE POINT DOSAGE GIVEN TO DATE: 33.88 GY
RAD ONC ARIA REFERENCE POINT DOSAGE GIVEN TO DATE: 34.71 GY
RAD ONC ARIA REFERENCE POINT ID: NORMAL
RAD ONC ARIA REFERENCE POINT ID: NORMAL
RAD ONC ARIA REFERENCE POINT SESSION DOSAGE GIVEN: 2.61 GY
RAD ONC ARIA REFERENCE POINT SESSION DOSAGE GIVEN: 2.67 GY

## 2024-12-20 ENCOUNTER — HOSPITAL ENCOUNTER (OUTPATIENT)
Facility: HOSPITAL | Age: 69
Discharge: HOME | End: 2024-12-23
Attending: RADIOLOGY

## 2024-12-20 LAB
RAD ONC ARIA COURSE FIRST TREATMENT DATE: NORMAL
RAD ONC ARIA COURSE ID: NORMAL
RAD ONC ARIA COURSE INTENT: NORMAL
RAD ONC ARIA COURSE LAST TREATMENT DATE: NORMAL
RAD ONC ARIA COURSE SESSION NUMBER: 14
RAD ONC ARIA COURSE START DATE: NORMAL
RAD ONC ARIA COURSE TREATMENT ELAPSED DAYS: 18
RAD ONC ARIA PLAN FRACTIONS TREATED TO DATE: 14
RAD ONC ARIA PLAN ID: NORMAL
RAD ONC ARIA PLAN PRESCRIBED DOSE PER FRACTION: 2.67 GY
RAD ONC ARIA PLAN PRIMARY REFERENCE POINT: NORMAL
RAD ONC ARIA PLAN TOTAL FRACTIONS PRESCRIBED: 15
RAD ONC ARIA PLAN TOTAL PRESCRIBED DOSE: 4005 CGY
RAD ONC ARIA REFERENCE POINT DOSAGE GIVEN TO DATE: 36.49 GY
RAD ONC ARIA REFERENCE POINT DOSAGE GIVEN TO DATE: 37.38 GY
RAD ONC ARIA REFERENCE POINT ID: NORMAL
RAD ONC ARIA REFERENCE POINT ID: NORMAL
RAD ONC ARIA REFERENCE POINT SESSION DOSAGE GIVEN: 2.61 GY
RAD ONC ARIA REFERENCE POINT SESSION DOSAGE GIVEN: 2.67 GY

## 2024-12-23 ENCOUNTER — HOSPITAL ENCOUNTER (OUTPATIENT)
Facility: HOSPITAL | Age: 69
Discharge: HOME | End: 2024-12-26
Attending: RADIOLOGY

## 2024-12-23 LAB
RAD ONC ARIA COURSE FIRST TREATMENT DATE: NORMAL
RAD ONC ARIA COURSE ID: NORMAL
RAD ONC ARIA COURSE INTENT: NORMAL
RAD ONC ARIA COURSE LAST TREATMENT DATE: NORMAL
RAD ONC ARIA COURSE SESSION NUMBER: 15
RAD ONC ARIA COURSE START DATE: NORMAL
RAD ONC ARIA COURSE TREATMENT ELAPSED DAYS: 21
RAD ONC ARIA PLAN FRACTIONS TREATED TO DATE: 15
RAD ONC ARIA PLAN ID: NORMAL
RAD ONC ARIA PLAN PRESCRIBED DOSE PER FRACTION: 2.67 GY
RAD ONC ARIA PLAN PRIMARY REFERENCE POINT: NORMAL
RAD ONC ARIA PLAN TOTAL FRACTIONS PRESCRIBED: 15
RAD ONC ARIA PLAN TOTAL PRESCRIBED DOSE: 4005 CGY
RAD ONC ARIA REFERENCE POINT DOSAGE GIVEN TO DATE: 39.09 GY
RAD ONC ARIA REFERENCE POINT DOSAGE GIVEN TO DATE: 40.05 GY
RAD ONC ARIA REFERENCE POINT ID: NORMAL
RAD ONC ARIA REFERENCE POINT ID: NORMAL
RAD ONC ARIA REFERENCE POINT SESSION DOSAGE GIVEN: 2.61 GY
RAD ONC ARIA REFERENCE POINT SESSION DOSAGE GIVEN: 2.67 GY

## 2024-12-26 ENCOUNTER — CLINICAL DOCUMENTATION (OUTPATIENT)
Facility: HOSPITAL | Age: 69
End: 2024-12-26

## 2024-12-26 NOTE — PROGRESS NOTES
Cancer [place] at [facility]  [facility]      RADIATION ONCOLOGY CLINICAL END OF TREATMENT NOTE    Encounter Date: 12/26/24   Patient Name: Nory Noe  YOB: 1955  Medical Record Number: [id number]    DIAGNOSIS:       ICD-10-CM    1. Malignant neoplasm of lower-outer quadrant of left breast of female, estrogen receptor positive (HCC)  C50.512     Z17.0         STAGING:   Cancer Staging   Malignant neoplasm of lower-outer quadrant of left breast of female, estrogen receptor positive (HCC)  Staging form: Breast, AJCC 8th Edition  - Pathologic stage from 9/5/2024: Stage IB (pT2, pN1mi(sn), cM0, G2, ER+, CT+, HER2-) - Signed by Ad Marin MD on 11/6/2024  - Clinical stage from 5/14/2024: Stage IA (cT1c, cN0, cM0, G2, ER+, CT+, HER2-) - Signed by Ad Marin MD on 11/6/2024  AJCC Staging has been reviewed    ASSESSMENT:   68 yo F with a cT1cN0 LEFT breast IDC, ER+/CT+/HER2 neg status post lumpectomy/SLNB ([id number], +DCIS margin, grade 2) followed by re-excision (neg margins).       TREATMENT SUMMARY:     Treatment Site Modality Dose/Fx (cGy) #Fx Total Dose (cGy) Start Date End Date Elapsed Days   Left breast 3D- 15 4005 12/2/24 12/23/24 22       CONCURRENT THERAPY: None    TREATMENT RESPONSE:  Treatment completed as planned      MEDICATIONS:     Current Outpatient Medications:     losartan (COZAAR) 25 MG tablet, TAKE 1 TABLET EVERY DAY, Disp: 90 tablet, Rfl: 1    Semaglutide (RYBELSUS) 7 MG TABS, Take 1 tablet by mouth every morning (before breakfast), Disp: 90 tablet, Rfl: 1    anastrozole (ARIMIDEX) 1 MG tablet, Take 1 tablet by mouth daily, Disp: 90 tablet, Rfl: 3    metFORMIN (GLUCOPHAGE) 500 MG tablet, Take 1 tablet by mouth 2 times daily (with meals), Disp: 180 tablet, Rfl: 1    atorvastatin (LIPITOR) 10 MG tablet, Take 1 tablet by mouth daily, Disp: 90 tablet, Rfl: 0    vitamin C (ASCORBIC ACID) 500 MG tablet, Take 1 tablet by

## 2025-01-02 ENCOUNTER — PATIENT MESSAGE (OUTPATIENT)
Age: 70
End: 2025-01-02

## 2025-01-02 DIAGNOSIS — E11.9 CONTROLLED TYPE 2 DIABETES MELLITUS WITHOUT COMPLICATION, WITHOUT LONG-TERM CURRENT USE OF INSULIN (HCC): ICD-10-CM

## 2025-01-03 ENCOUNTER — TELEPHONE (OUTPATIENT)
Age: 70
End: 2025-01-03

## 2025-01-03 NOTE — TELEPHONE ENCOUNTER
Patient called and stated that she needed to reschedule her appt due to her having a colonoscopy that same day. Pt rescheduled to 3/4 at 2:45

## 2025-01-24 NOTE — PROGRESS NOTES
Nory Noe is a 69 y.o. female returns for an annual exam     Chief Complaint   Patient presents with    Annual Exam       No LMP recorded. Patient has had a hysterectomy.  Problems: no problems  Birth Control: status post hysterectomy.  Last Pap: no record.  She does not have a history of DIMITRI 2, 3 or cervical cancer.   Last Mammogram:  had last year and this year as well .  It was see report.   Last Bone Density: see report obtained 1 year(s) ago.  Last colonoscopy: 2/2025    1. Have you been to the ER, urgent care clinic, or hospitalized since your last visit? No    2. Have you seen or consulted any other health care providers outside of the [facility] since your last visit? No    Examination chaperoned by Dana Majano MA.

## 2025-01-27 ENCOUNTER — OFFICE VISIT (OUTPATIENT)
Age: 70
End: 2025-01-27
Payer: MEDICARE

## 2025-01-27 VITALS
SYSTOLIC BLOOD PRESSURE: 154 MMHG | WEIGHT: 202 LBS | HEART RATE: 111 BPM | DIASTOLIC BLOOD PRESSURE: 88 MMHG | BODY MASS INDEX: 35.79 KG/M2 | HEIGHT: 63 IN

## 2025-01-27 DIAGNOSIS — Z01.419 ENCOUNTER FOR WELL WOMAN EXAM: Primary | ICD-10-CM

## 2025-01-27 DIAGNOSIS — Z12.4 CERVICAL CANCER SCREENING: ICD-10-CM

## 2025-01-27 DIAGNOSIS — Z11.51 SCREENING FOR HUMAN PAPILLOMAVIRUS: ICD-10-CM

## 2025-01-27 PROCEDURE — 1126F AMNT PAIN NOTED NONE PRSNT: CPT | Performed by: STUDENT IN AN ORGANIZED HEALTH CARE EDUCATION/TRAINING PROGRAM

## 2025-01-27 PROCEDURE — 99387 INIT PM E/M NEW PAT 65+ YRS: CPT | Performed by: STUDENT IN AN ORGANIZED HEALTH CARE EDUCATION/TRAINING PROGRAM

## 2025-01-27 PROCEDURE — 1159F MED LIST DOCD IN RCRD: CPT | Performed by: STUDENT IN AN ORGANIZED HEALTH CARE EDUCATION/TRAINING PROGRAM

## 2025-01-27 SDOH — ECONOMIC STABILITY: FOOD INSECURITY: WITHIN THE PAST 12 MONTHS, THE FOOD YOU BOUGHT JUST DIDN'T LAST AND YOU DIDN'T HAVE MONEY TO GET MORE.: NEVER TRUE

## 2025-01-27 SDOH — ECONOMIC STABILITY: FOOD INSECURITY: WITHIN THE PAST 12 MONTHS, YOU WORRIED THAT YOUR FOOD WOULD RUN OUT BEFORE YOU GOT MONEY TO BUY MORE.: NEVER TRUE

## 2025-01-27 NOTE — PROGRESS NOTES
Annual exam ages 65+    Nory Noe is a No obstetric history on file.,  69 y.o. female   No LMP recorded. Patient has had a hysterectomy.    She presents for her annual checkup.     She is having no problems.      Menstrual status:    Her periods are absent due to menopause.    Contraception:    The current method of family planning is NA post menopause.    Hormonal status:    She is not having vasomotor symptoms.  The patient is not using any ERT.    Sexual history:    She  reports that she is not currently sexually active and has had partner(s) who are male. She reports using the following method of birth control/protection: Surgical.    Medical conditions:    Since her last annual GYN exam about 5 years ago, she has not the following changes in her health history: none.     Pap and Mammogram History:    S/p total hysterectomt 2006, benign indications    Treated for L breast cancer surgically last summer, s/p radiation tx and now on anastrozole    Osteoporosis History:    Family history does not include a first or second degree relative with osteopenia or osteoporosis.    A bone density scan was obtained 7/2024 and revealed osteopenia.   She is currently taking calcium and vit D.    Past Medical History:   Diagnosis Date    Acquired absence of both cervix and uterus 4/5/2012    Breast cancer (HCC)     LEFT    Diabetes (HCC)     Hx of bone density study 11/25/2015    Osteopenia    Hx of mammogram 2/25/13    Negative    Hypercholesterolemia     PT STATES SHE ONLY TAKES MEDS TO KEEP HER CHOLESTEROL LOW DUE TO HER DIABETES    Hypertension     PT STATES SHE ONLY TAKES BP MEDS TO KEEP HER BP LOW DUE TO HER DIABETES    Obesity     Long time    Osteoarthritis Several years    Osteopenia 2015,11/11    Mild    Other specified hypothyroidism 03/05/2024    Transient hypothyroidism requiring levothyroxine      Pap smear for cervical cancer screening 5/8/06    Normal Pap    Symptomatic menopausal or female climacteric

## 2025-01-29 LAB
., LABCORP: NORMAL
CYTOLOGIST CVX/VAG CYTO: NORMAL
CYTOLOGY CVX/VAG DOC CYTO: NORMAL
CYTOLOGY CVX/VAG DOC THIN PREP: NORMAL
DX ICD CODE: NORMAL
Lab: NORMAL
Lab: NORMAL
OTHER STN SPEC: NORMAL
STAT OF ADQ CVX/VAG CYTO-IMP: NORMAL

## 2025-03-04 ENCOUNTER — OFFICE VISIT (OUTPATIENT)
Age: 70
End: 2025-03-04
Payer: MEDICARE

## 2025-03-04 VITALS
HEART RATE: 86 BPM | DIASTOLIC BLOOD PRESSURE: 79 MMHG | WEIGHT: 205 LBS | SYSTOLIC BLOOD PRESSURE: 140 MMHG | BODY MASS INDEX: 36.32 KG/M2 | RESPIRATION RATE: 17 BRPM | OXYGEN SATURATION: 97 % | HEIGHT: 63 IN | TEMPERATURE: 98.4 F

## 2025-03-04 DIAGNOSIS — C77.3 SECONDARY AND UNSPECIFIED MALIGNANT NEOPLASM OF AXILLA AND UPPER LIMB LYMPH NODES (HCC): ICD-10-CM

## 2025-03-04 DIAGNOSIS — Z17.0 MALIGNANT NEOPLASM OF LOWER-OUTER QUADRANT OF LEFT BREAST OF FEMALE, ESTROGEN RECEPTOR POSITIVE (HCC): Primary | ICD-10-CM

## 2025-03-04 DIAGNOSIS — R23.2 HOT FLASHES: ICD-10-CM

## 2025-03-04 DIAGNOSIS — C50.512 MALIGNANT NEOPLASM OF LOWER-OUTER QUADRANT OF LEFT BREAST OF FEMALE, ESTROGEN RECEPTOR POSITIVE (HCC): Primary | ICD-10-CM

## 2025-03-04 PROCEDURE — 99214 OFFICE O/P EST MOD 30 MIN: CPT | Performed by: INTERNAL MEDICINE

## 2025-03-04 ASSESSMENT — PATIENT HEALTH QUESTIONNAIRE - PHQ9
SUM OF ALL RESPONSES TO PHQ QUESTIONS 1-9: 0
SUM OF ALL RESPONSES TO PHQ QUESTIONS 1-9: 0
1. LITTLE INTEREST OR PLEASURE IN DOING THINGS: NOT AT ALL
SUM OF ALL RESPONSES TO PHQ QUESTIONS 1-9: 0
SUM OF ALL RESPONSES TO PHQ QUESTIONS 1-9: 0
2. FEELING DOWN, DEPRESSED OR HOPELESS: NOT AT ALL

## 2025-03-04 NOTE — PROGRESS NOTES
Nory Noe is a 69 y.o. female is here today for a breast cancer follow up.    1. Have you been to the ER, urgent care clinic since your last visit?  Hospitalized since your last visit?No    2. Have you seen or consulted any other health care providers outside of the [facility] since your last visit?  Include any pap smears or colon screening. No    
Symptomatic menopausal or female climacteric states 4/5/2012    Thyroid disease     THYROID NODULES, JUST TAKEN OFF THYROID MEDICATION AND LABS WNL PER PT    Type 2 diabetes mellitus without complication (HCC) 8 years?      Past Surgical History:   Procedure Laterality Date    BREAST BIOPSY  years ago     Neg    BREAST LUMPECTOMY Left 09/05/2024    LEFT BREAST REDUCTION LUMPECTOMY, LEFT BREAST SENTINEL NODE BIOPSY performed by Sherman Alatorre Jr, MD at [facility] AMBULATORY OR    BREAST REDUCTION SURGERY Bilateral 09/05/2024    LEFT ONCOPLASTIC REDUCTION, RIGHT REDUCTION FOR SYMMETRY performed by Kristal Carter MD at [facility] AMBULATORY OR    BREAST SURGERY Left 10/03/2024    RE-EXCISION SUPERIOR MARGIN LEFT BREAST performed by Sherman Alatorre Jr, MD at [facility] AMBULATORY OR    CARPAL TUNNEL RELEASE Bilateral 2019    COLONOSCOPY      HYSTERECTOMY, TOTAL ABDOMINAL (CERVIX REMOVED)  10/2003    fibroids    MRI BIOPSY BREAST W LOC DEVICE LEFT 1ST LESION Left 06/27/2024    MRI BREAST BX USING DEVICE LEFT 6/27/2024 [facility] RAD MRI    US BREAST BIOPSY W LOC DEVICE 1ST LESION LEFT Left 05/14/2024    US BREAST BIOPSY W LOC DEVICE 1ST LESION LEFT 5/14/2024 [facility] RAD US    WISDOM TOOTH EXTRACTION        Social History     Tobacco Use    Smoking status: Never    Smokeless tobacco: Never   Substance Use Topics    Alcohol use: Yes     Alcohol/week: 4.0 standard drinks of alcohol     Types: 2 Glasses of wine, 2 Drinks containing 0.5 oz of alcohol per week     Comment: 2x/week, 1 drink      Family History   Problem Relation Age of Onset    Mult Sclerosis Mother     Lung Cancer Father         Lung cancer    Diabetes Father         possibly    Anesth Problems Father         PT STATES HER FATHER \"ALWAYS HAD A VERY DIFFICULT TIME WITH ANESTHESIA AND STOPPED BREATHING\"    Skin Cancer Father     Prostate Cancer Father     Mult Sclerosis Sister     Diabetes Sister     Osteoporosis Sister     Diabetes Brother         Diabetes    Heart Disease Brother

## 2025-04-08 ENCOUNTER — HOSPITAL ENCOUNTER (OUTPATIENT)
Facility: HOSPITAL | Age: 70
Discharge: HOME | End: 2025-04-11
Attending: RADIOLOGY

## 2025-04-08 VITALS
OXYGEN SATURATION: 95 % | RESPIRATION RATE: 16 BRPM | SYSTOLIC BLOOD PRESSURE: 171 MMHG | WEIGHT: 206.6 LBS | HEIGHT: 63 IN | HEART RATE: 85 BPM | BODY MASS INDEX: 36.61 KG/M2 | DIASTOLIC BLOOD PRESSURE: 96 MMHG

## 2025-04-08 DIAGNOSIS — C50.512 MALIGNANT NEOPLASM OF LOWER-OUTER QUADRANT OF LEFT BREAST OF FEMALE, ESTROGEN RECEPTOR POSITIVE: Primary | ICD-10-CM

## 2025-04-08 DIAGNOSIS — Z17.0 MALIGNANT NEOPLASM OF LOWER-OUTER QUADRANT OF LEFT BREAST OF FEMALE, ESTROGEN RECEPTOR POSITIVE: Primary | ICD-10-CM

## 2025-04-08 ASSESSMENT — PAIN DESCRIPTION - FREQUENCY: FREQUENCY: CONTINUOUS

## 2025-04-08 ASSESSMENT — PAIN SCALES - GENERAL: PAINLEVEL_OUTOF10: 2

## 2025-04-08 ASSESSMENT — PAIN DESCRIPTION - ORIENTATION: ORIENTATION: LEFT

## 2025-04-08 ASSESSMENT — PAIN DESCRIPTION - DESCRIPTORS: DESCRIPTORS: TENDER;TIGHTNESS

## 2025-04-08 ASSESSMENT — PAIN DESCRIPTION - LOCATION: LOCATION: BREAST

## 2025-04-08 NOTE — PROGRESS NOTES
2  DCIS: present  LVI: negative  Margins: negative (closest IDC 7mm). POSITIVE for DCIS (superior margin)  1/3 LN+ (0.6mm, no GEORGE)  [id number]     10/3/24 - re-excision of superior margin shows intraductal papilloma and atypical ductal hyperplasia, no malignancy.    12/2/24 - 12/23/24: adjuvant radiation to the LEFT breast only (4005cGy/15 fx)     1/1/25: begins anastrozole      INTERVAL HISTORY:   68 yo F returns for follow up. She completed adjuvant radiation to the LEFT breast only (4005cGy/15 fx) from 12/2/24 - 12/23/24. She then started anastrozole on 1/1/25 and currently is tolerating well.    Today she is doing well. Reports acute dermatitis secondary to radiation has resolved. Still has mild discomfort at times in the left breast/left rib area and her left arm mobility is good, but motion will at times feel tighter, especially when reaching for items. Denies lymphedema.  And no fevers, chills, chest wall pain, dyspnea, or cough.      REVIEW OF SYSTEMS:  A 12 point review of systems was negative other than what is stated above in the HPI.      PAST MEDICAL / SURGICAL HISTORY:   Reviewed in EPIC    ALLERGIES / MEDICATIONS:   Reviewed in EPIC    PHYSICAL EXAM:   Vital Signs for this encounter:   BP (!) 171/96   Pulse 85   Resp 16   Ht 1.6 m (5' 2.99\")   Wt 93.7 kg (206 lb 9.6 oz)   SpO2 95%   BMI 36.61 kg/m²   Performance status: ECOG 0, fully active, able to carry on all predisease activities without restrictions  General:  No evidence of impaired alertness, inadequate appearance, premature or advanced chronologic age, uncooperativeness, developmental delays, altered mood and affect and disorientation.  Respiratory:  No evidence of abnormal breath sounds, chest abnormalities on palpation and chest abnormalities on percussion.  Breast: well healed left lumpectomy site  Back/spine: No evidence of reduced flexibility and abnormal spinal curvature.  Musculoskeletal: No evidence of bone abnormalities, joint

## 2025-04-14 DIAGNOSIS — E11.9 CONTROLLED TYPE 2 DIABETES MELLITUS WITHOUT COMPLICATION, WITHOUT LONG-TERM CURRENT USE OF INSULIN (HCC): ICD-10-CM

## 2025-04-14 DIAGNOSIS — E78.5 HYPERLIPIDEMIA, UNSPECIFIED HYPERLIPIDEMIA TYPE: ICD-10-CM

## 2025-04-14 LAB
ALBUMIN SERPL-MCNC: 4 G/DL (ref 3.5–5)
ALBUMIN/GLOB SERPL: 1.1 (ref 1.1–2.2)
ALP SERPL-CCNC: 77 U/L (ref 45–117)
ALT SERPL-CCNC: 33 U/L (ref 12–78)
ANION GAP SERPL CALC-SCNC: 4 MMOL/L (ref 2–12)
AST SERPL-CCNC: 19 U/L (ref 15–37)
BILIRUB SERPL-MCNC: 0.4 MG/DL (ref 0.2–1)
BUN SERPL-MCNC: 9 MG/DL (ref 6–20)
BUN/CREAT SERPL: 13 (ref 12–20)
CALCIUM SERPL-MCNC: 9.7 MG/DL (ref 8.5–10.1)
CHLORIDE SERPL-SCNC: 103 MMOL/L (ref 97–108)
CHOLEST SERPL-MCNC: 128 MG/DL
CO2 SERPL-SCNC: 31 MMOL/L (ref 21–32)
CREAT SERPL-MCNC: 0.72 MG/DL (ref 0.55–1.02)
CREAT UR-MCNC: 165 MG/DL
ERYTHROCYTE [DISTWIDTH] IN BLOOD BY AUTOMATED COUNT: 12.5 % (ref 11.5–14.5)
EST. AVERAGE GLUCOSE BLD GHB EST-MCNC: 140 MG/DL
GLOBULIN SER CALC-MCNC: 3.7 G/DL (ref 2–4)
GLUCOSE SERPL-MCNC: 112 MG/DL (ref 65–100)
HBA1C MFR BLD: 6.5 % (ref 4–5.6)
HCT VFR BLD AUTO: 40.2 % (ref 35–47)
HDLC SERPL-MCNC: 36 MG/DL
HDLC SERPL: 3.6 (ref 0–5)
HGB BLD-MCNC: 12.9 G/DL (ref 11.5–16)
LDLC SERPL CALC-MCNC: 70.2 MG/DL (ref 0–100)
MCH RBC QN AUTO: 27.8 PG (ref 26–34)
MCHC RBC AUTO-ENTMCNC: 32.1 G/DL (ref 30–36.5)
MCV RBC AUTO: 86.6 FL (ref 80–99)
MICROALBUMIN UR-MCNC: 1.76 MG/DL
MICROALBUMIN/CREAT UR-RTO: 11 MG/G (ref 0–30)
NRBC # BLD: 0 K/UL (ref 0–0.01)
NRBC BLD-RTO: 0 PER 100 WBC
PLATELET # BLD AUTO: 182 K/UL (ref 150–400)
PMV BLD AUTO: 10.1 FL (ref 8.9–12.9)
POTASSIUM SERPL-SCNC: 4.2 MMOL/L (ref 3.5–5.1)
PROT SERPL-MCNC: 7.7 G/DL (ref 6.4–8.2)
RBC # BLD AUTO: 4.64 M/UL (ref 3.8–5.2)
SODIUM SERPL-SCNC: 138 MMOL/L (ref 136–145)
SPECIMEN HOLD: NORMAL
TRIGL SERPL-MCNC: 109 MG/DL
VLDLC SERPL CALC-MCNC: 21.8 MG/DL
WBC # BLD AUTO: 6.3 K/UL (ref 3.6–11)

## 2025-04-15 ENCOUNTER — RESULTS FOLLOW-UP (OUTPATIENT)
Age: 70
End: 2025-04-15

## 2025-04-23 ENCOUNTER — OFFICE VISIT (OUTPATIENT)
Age: 70
End: 2025-04-23
Payer: MEDICARE

## 2025-04-23 VITALS
HEART RATE: 93 BPM | OXYGEN SATURATION: 96 % | DIASTOLIC BLOOD PRESSURE: 82 MMHG | HEIGHT: 63 IN | RESPIRATION RATE: 18 BRPM | WEIGHT: 203.8 LBS | BODY MASS INDEX: 36.11 KG/M2 | TEMPERATURE: 97.6 F | SYSTOLIC BLOOD PRESSURE: 140 MMHG

## 2025-04-23 DIAGNOSIS — M85.80 OSTEOPENIA, UNSPECIFIED LOCATION: ICD-10-CM

## 2025-04-23 DIAGNOSIS — E11.9 CONTROLLED TYPE 2 DIABETES MELLITUS WITHOUT COMPLICATION, WITHOUT LONG-TERM CURRENT USE OF INSULIN (HCC): Primary | ICD-10-CM

## 2025-04-23 DIAGNOSIS — R06.83 SNORING: ICD-10-CM

## 2025-04-23 PROCEDURE — 99214 OFFICE O/P EST MOD 30 MIN: CPT | Performed by: STUDENT IN AN ORGANIZED HEALTH CARE EDUCATION/TRAINING PROGRAM

## 2025-04-23 PROCEDURE — 1123F ACP DISCUSS/DSCN MKR DOCD: CPT | Performed by: STUDENT IN AN ORGANIZED HEALTH CARE EDUCATION/TRAINING PROGRAM

## 2025-04-23 PROCEDURE — 1160F RVW MEDS BY RX/DR IN RCRD: CPT | Performed by: STUDENT IN AN ORGANIZED HEALTH CARE EDUCATION/TRAINING PROGRAM

## 2025-04-23 PROCEDURE — 1126F AMNT PAIN NOTED NONE PRSNT: CPT | Performed by: STUDENT IN AN ORGANIZED HEALTH CARE EDUCATION/TRAINING PROGRAM

## 2025-04-23 PROCEDURE — 1159F MED LIST DOCD IN RCRD: CPT | Performed by: STUDENT IN AN ORGANIZED HEALTH CARE EDUCATION/TRAINING PROGRAM

## 2025-04-23 PROCEDURE — 3044F HG A1C LEVEL LT 7.0%: CPT | Performed by: STUDENT IN AN ORGANIZED HEALTH CARE EDUCATION/TRAINING PROGRAM

## 2025-04-23 RX ORDER — LOSARTAN POTASSIUM 25 MG/1
25 TABLET ORAL DAILY
Qty: 90 TABLET | Refills: 3 | Status: SHIPPED | OUTPATIENT
Start: 2025-04-23

## 2025-04-23 RX ORDER — ATORVASTATIN CALCIUM 10 MG/1
10 TABLET, FILM COATED ORAL DAILY
Qty: 90 TABLET | Refills: 3 | Status: SHIPPED | OUTPATIENT
Start: 2025-04-23

## 2025-04-23 NOTE — ASSESSMENT & PLAN NOTE
Well controlled, reviewed labs with her today  She wants to try ozempic instead of rybelsus. Will start with 0.5mg dose and she will let me know how if she is having any appetite suppressant effects around week 3 so we can decide if we should maintain on 0.5mg dose or go up to 1mg dose.   Continue metformin.    Foot exam today, no deficits

## 2025-04-23 NOTE — PROGRESS NOTES
Nory Noe is a 69 y.o. year old female who presents today (25) for follow-up.     Assessment & Plan:   1. Controlled type 2 diabetes mellitus without complication, without long-term current use of insulin  Assessment & Plan:  Well controlled, reviewed labs with her today  She wants to try ozempic instead of rybelsus. Will start with 0.5mg dose and she will let me know how if she is having any appetite suppressant effects around week 3 so we can decide if we should maintain on 0.5mg dose or go up to 1mg dose.   Continue metformin.    Foot exam today, no deficits  Orders:  -     Semaglutide,0.25 or 0.5MG/DOS, 2 MG/3ML SOPN; Inject 0.25 mg into the skin every 7 days, Disp-3 mL, R-0Normal  -     metFORMIN (GLUCOPHAGE) 500 MG tablet; Take 1 tablet by mouth 2 times daily (with meals), Disp-180 tablet, R-3Normal  -     losartan (COZAAR) 25 MG tablet; Take 1 tablet by mouth daily, Disp-90 tablet, R-3Normal  -     atorvastatin (LIPITOR) 10 MG tablet; Take 1 tablet by mouth daily, Disp-90 tablet, R-3Normal  -     Hemoglobin A1C; Future  -      DIABETES FOOT EXAM  2. Osteopenia, unspecified location  Assessment & Plan:  Low FRAX. Cont Vit D.  Discussed weight bearing exercise  3. Snoring  Comments:  she will contact [facility] for sleep study    Health Maintenance   Flu vaccine: 10/2024  COVID vaccine: 10/2024   RSV: 10/2023  Tetanus vaccine: 2023  Shingles vaccine: first dose 25   Pneumonia vaccine: prevnar 20 25  Breast cancer screenin2024 - dx with breast ca  Colon cancer screening: [facility] 2025 Q5yr - will request  DEXA: 2024  Hep C:   Lipid: 2025  DM: 2025  Healthcare decision maker:    ACP: not interested in preparing, states  aware of her wishes    RTC: 6 mo AWV    Subjective:   Nory was seen today for 6 Month Follow-Up    History of Present Illness  The patient presents for evaluation of breast cancer, diabetes mellitus, sleep apnea    Having some rib pain

## 2025-04-23 NOTE — PATIENT INSTRUCTIONS
Send me a message on week 3 of ozempic to let me know how you are handling the dose.     Call [facility]

## 2025-04-29 ENCOUNTER — OFFICE VISIT (OUTPATIENT)
Age: 70
End: 2025-04-29
Payer: MEDICARE

## 2025-04-29 VITALS — HEIGHT: 62 IN | WEIGHT: 199 LBS | BODY MASS INDEX: 36.62 KG/M2

## 2025-04-29 DIAGNOSIS — C50.912 MALIGNANT NEOPLASM OF LEFT BREAST IN FEMALE, ESTROGEN RECEPTOR POSITIVE, UNSPECIFIED SITE OF BREAST (HCC): Primary | ICD-10-CM

## 2025-04-29 DIAGNOSIS — Z98.890 S/P LUMPECTOMY, RIGHT BREAST: ICD-10-CM

## 2025-04-29 DIAGNOSIS — Z17.0 MALIGNANT NEOPLASM OF LEFT BREAST IN FEMALE, ESTROGEN RECEPTOR POSITIVE, UNSPECIFIED SITE OF BREAST (HCC): Primary | ICD-10-CM

## 2025-04-29 DIAGNOSIS — Z85.3 HISTORY OF BREAST CANCER IN FEMALE: ICD-10-CM

## 2025-04-29 PROCEDURE — 1123F ACP DISCUSS/DSCN MKR DOCD: CPT | Performed by: NURSE PRACTITIONER

## 2025-04-29 PROCEDURE — 99213 OFFICE O/P EST LOW 20 MIN: CPT | Performed by: NURSE PRACTITIONER

## 2025-04-29 PROCEDURE — 1159F MED LIST DOCD IN RCRD: CPT | Performed by: NURSE PRACTITIONER

## 2025-04-29 PROCEDURE — 1160F RVW MEDS BY RX/DR IN RCRD: CPT | Performed by: NURSE PRACTITIONER

## 2025-04-29 NOTE — PROGRESS NOTES
HISTORY OF PRESENT ILLNESS  Nory Noe is a 69 y.o. female     HPI  Established patient presents for follow-up for LEFT breast cancer. Denies breast mass, skin changes, nipple discharge and pain.          Breast history -   Referring - Dr. Loredo  2024: LEFT breast biopsy, 5-6:00 -   PATH: IDC, ER+(100%)/CA+(100%)/HER-2-, Ki-67 7%, favor histologic grade 2, 7.0mm largest invasive tumor focus in 1 tissue core, DCIS, ER+(100%)/CA+(100%), nuclear grade 1-2, solid and cribriform types.   MammaPrint: Low Risk, Luminal A-Type, +0.242   24: LEFT breast, bx PATH: DCIS, nuclear gr 2, solid patterns. ER+(100%)/CA+(100%).   24: LEFT breast reduction lumpectomy and LEFT SLNBx (with Dr. Carter) - Dr. Alatorre  - LEFT axillary sentinel node #1, biopsy: One lymph node, positive for micrometastatic carcinoma (1/).   - LEFT axillary sentinel node #2 and #3, biopsy: Two LNs, negative for metastatic carcinoma (0/2).   - LEFT breast, lumpectomy: IDC, overall grade 2, 25mm at greatest dimension. DCIS present. Negative margins. Pathologic stage: pT2, pN1mi (sn), ER+/CA+/HER2-, Ki-67 7%.   - LEFT breast skin, excision: Benign skin. Negative for invasive or in situ carcinoma.   - LEFT and RIGHT breast tissue, excision: Benign skin and subcutaneous tissue. Negative for invasive or in situ carcinoma.   10/3/24: LEFT breast, superior margin, excision - Dr. Alatorre  Intraductal papilloma. ADH with focal intraductal squamous metaplasia. No evidence for malignancy.   24: competed XRT - Dr. Marin  25: started anastrozole - Dr. Stockton          Family history -   Father - prostate,melanoma, lung cancer with mets to brain.  at age 83  Paternal cousin - breast cancer in her 50's and survived.  Paternal cousin - colon cancer in his 60's and survived.  Paternal grandfather - lung cancer  Brother - Non Hodgkin's lymphoma survived.   Maternal uncle - lung cancer  at age 80.  Paternal cousin - melanoma and

## 2025-05-05 ENCOUNTER — PATIENT MESSAGE (OUTPATIENT)
Age: 70
End: 2025-05-05

## 2025-05-15 ENCOUNTER — PATIENT MESSAGE (OUTPATIENT)
Age: 70
End: 2025-05-15

## 2025-05-15 DIAGNOSIS — E11.9 CONTROLLED TYPE 2 DIABETES MELLITUS WITHOUT COMPLICATION, WITHOUT LONG-TERM CURRENT USE OF INSULIN (HCC): Primary | ICD-10-CM

## 2025-06-19 ENCOUNTER — HOSPITAL ENCOUNTER (OUTPATIENT)
Facility: HOSPITAL | Age: 70
Discharge: HOME | End: 2025-06-22
Payer: MEDICARE

## 2025-06-19 VITALS — BODY MASS INDEX: 36.4 KG/M2 | WEIGHT: 199 LBS

## 2025-06-19 DIAGNOSIS — Z17.0 MALIGNANT NEOPLASM OF LOWER-OUTER QUADRANT OF LEFT BREAST OF FEMALE, ESTROGEN RECEPTOR POSITIVE (HCC): ICD-10-CM

## 2025-06-19 DIAGNOSIS — C50.512 MALIGNANT NEOPLASM OF LOWER-OUTER QUADRANT OF LEFT BREAST OF FEMALE, ESTROGEN RECEPTOR POSITIVE (HCC): ICD-10-CM

## 2025-06-19 PROCEDURE — G0279 TOMOSYNTHESIS, MAMMO: HCPCS

## (undated) DEVICE — SUTURE PERMA-HAND SZ 2-0 L30IN NONABSORBABLE BLK L26MM SH K833H

## (undated) DEVICE — GLOVE ORANGE PI 7 1/2   MSG9075

## (undated) DEVICE — X-RAY DETECTABLE SPONGES,16 PLY: Brand: VISTEC

## (undated) DEVICE — Device: Brand: JELCO

## (undated) DEVICE — ADHESIVE SKIN CLSR 0.7ML TOP DERMBND ADV

## (undated) DEVICE — PLASTICS CHEST BREAST ASU: Brand: MEDLINE INDUSTRIES, INC.

## (undated) DEVICE — SEALER LAP SM L18.8CM OPN JAW HAND/FOOT SWCH FORCETRIAD

## (undated) DEVICE — GLOVE SURG SZ 6 THK91MIL LTX FREE SYN POLYISOPRENE ANTI

## (undated) DEVICE — LIQUIBAND RAPID ADHESIVE 36/CS 0.8ML: Brand: MEDLINE

## (undated) DEVICE — NEEDLE HYPO 23GA L1IN TURQ POLYPR HUB S STL REG BVL STR W/O

## (undated) DEVICE — PAD,ABDOMINAL,5"X9",ST,LF,25/BX: Brand: MEDLINE INDUSTRIES, INC.

## (undated) DEVICE — UNDERPAD INCONT 36X23 IN POLYESTER COTTON SLEEPDRI

## (undated) DEVICE — SPONGE GZ W4XL4IN COT 12 PLY TYP VII WVN C FLD DSGN STERILE

## (undated) DEVICE — SOLUTION IRRIG 1000ML 0.9% SOD CHL USP POUR PLAS BTL

## (undated) DEVICE — GARMENT,MEDLINE,DVT,INT,CALF,MED, GEN2: Brand: MEDLINE

## (undated) DEVICE — MARKER,SKIN,WI/RULER AND LABELS: Brand: MEDLINE

## (undated) DEVICE — HYPODERMIC SAFETY NEEDLE: Brand: MAGELLAN

## (undated) DEVICE — SUTURE MONOCRYL SZ 3-0 L27IN ABSRB UD L24MM PS-1 3/8 CIR PRIM Y936H

## (undated) DEVICE — SUTURE VICRYL + SZ 3-0 L27IN ABSRB UD L26MM SH 1/2 CIR VCP416H

## (undated) DEVICE — BLADE ES L4IN INSUL EDGE

## (undated) DEVICE — SPONGE LAP W18XL18IN WHT COT 4 PLY FLD STRUNG RADPQ DISP ST 2 PER PACK

## (undated) DEVICE — KIT HEMSTAT AGNT 2GM ENT HEMADERM W/ FLEXTIP APPL

## (undated) DEVICE — SYRINGE MED 10ML LUERLOCK TIP W/O SFTY DISP

## (undated) DEVICE — SKIN PREP TRAY 4 COMPARTM TRAY: Brand: MEDLINE INDUSTRIES, INC.

## (undated) DEVICE — SUTURE MONOCRYL STRATAFIX SPRL + SZ 3-0 L24IN ABSRB UD PS-2 SXMP1B108

## (undated) DEVICE — Device

## (undated) DEVICE — SURGICAL BRA: Brand: DEROYAL

## (undated) DEVICE — BLADE ES ELASTOMERIC COAT INSUL DURABLE BEND UPTO 90DEG

## (undated) DEVICE — SUTURE PDS II SZ 2-0 L27IN ABSRB VLT L36MM CT-1 1/2 CIR Z339H

## (undated) DEVICE — PENCIL SMK EVAC ALL IN 1 DSGN ENH VISIBILITY IMPROVED AIR

## (undated) DEVICE — SUTURE MONOCRYL + SZ 4-0 L27IN ABSRB UD L19MM PS-2 3/8 CIR MCP426H

## (undated) DEVICE — COVER US PRB W5XL96IN LTX W/ GEL

## (undated) DEVICE — GOWN,AURORA,NON-REINFORCED,2XL: Brand: MEDLINE